# Patient Record
Sex: FEMALE | Race: BLACK OR AFRICAN AMERICAN | Employment: UNEMPLOYED | URBAN - METROPOLITAN AREA
[De-identification: names, ages, dates, MRNs, and addresses within clinical notes are randomized per-mention and may not be internally consistent; named-entity substitution may affect disease eponyms.]

---

## 2017-03-27 ENCOUNTER — OFFICE VISIT (OUTPATIENT)
Dept: SURGERY | Age: 37
End: 2017-03-27

## 2017-03-27 VITALS
HEIGHT: 68 IN | DIASTOLIC BLOOD PRESSURE: 75 MMHG | WEIGHT: 262 LBS | OXYGEN SATURATION: 99 % | HEART RATE: 78 BPM | SYSTOLIC BLOOD PRESSURE: 110 MMHG | RESPIRATION RATE: 14 BRPM | BODY MASS INDEX: 39.71 KG/M2 | TEMPERATURE: 98.4 F

## 2017-03-27 DIAGNOSIS — K80.10 CHRONIC CALCULOUS CHOLECYSTITIS: Primary | ICD-10-CM

## 2017-03-27 PROBLEM — E66.9 OBESITY, CLASS II, BMI 35-39.9: Status: ACTIVE | Noted: 2017-03-27

## 2017-03-27 NOTE — PROGRESS NOTES
Surgery History and Physical    Subjective:      Edu Jacques is a 29 y.o. black female who presents for evaluation of RUQ pain and gallstones. Ms. Nuzhat Hall has a h/o gallstones and has been seen by 2 different surgeons back in 2013. She was scheduled for surgery and cancelled twice because she is anxious. She continues to have postprandial RUQ pain, especially with fried foods. The pain is now radiating around her belly to her back. It is also occurring when she is not eating. She belches which relieves some of her pain. She denies any n/v, fever, diarrhea, or jaundice. She has no h/o PUD, pancreatitis, liver disease, IBD, or IBS. She has not had abdominal surgery previously. Past Medical History:   Diagnosis Date    Abnormal pap 3/2015; 4/2016 2015 LGSIL +HPV; 2013 neg pap +HPV;4/2016 neg pap +HPV    Depression     Eczema     H/O colposcopy with cervical biopsy 3/15 + 10/2013    neg    Morbid obesity ([address]     Multiple sclerosis ([address]     Psychiatric disorder     depression    Seizures ([address]      Past Surgical History:   Procedure Laterality Date    HX BUNIONECTOMY      HX COLPOSCOPY  6/2016    neg    HX GYN      HX LEEP PROCEDURE  6/08    neg    HX ORTHOPAEDIC  2012    bunionectomy, L      Family History   Problem Relation Age of Onset    Diabetes Mother     Hypertension Mother     MS Mother     Cancer Mother      breast    Bipolar Disorder Father     No Known Problems Sister     Breast Cancer Maternal Grandmother      Social History   Substance Use Topics    Smoking status: Never Smoker    Smokeless tobacco: Never Used    Alcohol use Yes      Comment: occasionally      Prior to Admission medications    Medication Sig Start Date End Date Taking? Authorizing Provider   citalopram (CELEXA) 40 mg tablet Take 1.5 Tabs by mouth every evening.  Indications: patient takes a pill and a half 11/28/16  Yes Rajat Sahu MD   levETIRAcetam (KEPPRA) 500 mg tablet Take 1 Tab by mouth two (2) times a day. 8/20/16  Yes Historical Provider   LUXIQ 0.12 % topical foam Apply 1 Applicator to affected area every month. 8/17/16  Yes Historical Provider   dimethyl fumarate 240 mg cpDR Take  by mouth two (2) times a day. Yes Historical Provider   norethindrone-ethinyl estradiol (CYCLAFEM 1/35, 28,) 1-35 mg-mcg tab Take 1 Tab by mouth daily. 4/4/16  Yes Carlita Pennington MD      Allergies   Allergen Reactions    Amoxicillin Hives    Penicillins Unable to Obtain       Review of Systems:  A comprehensive review of systems was negative except for that written in the History of Present Illness. Objective:      Physical Exam:  GENERAL: alert, cooperative, no distress, appears stated age, EYE: negative findings: anicteric sclera, LYMPHATIC: Cervical, supraclavicular nodes normal. , THROAT & NECK: neck supple and symmetrical.  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 Plan:     Ms. Tushar Retana is still anxious, but wants to have her GB removed. I discussed the risks of the procedure including bleeding, infection, wound healing problems, persistent symptoms, reaction to the contrast, prep, or local anesthetic, and injury to the bowel, bile duct, or liver. She understands the risks; any and all questions were answered to her satisfaction. Ms. Tushar Retana will be scheduled for an elective outpatient robotic-assisted laparoscopic cholecystectomy with firefly, possible cholangiogram, possible open under general anesthesia.     Signed By: Catia Martinez MD     March 27, 2017

## 2017-03-27 NOTE — PROGRESS NOTES
1. Have you been to the ER, urgent care clinic since your last visit? Hospitalized since your last visit?no    2. Have you seen or consulted any other health care providers outside of the [address] [place] [address] since your last visit? Include any pap smears or colon screening.  no

## 2017-03-30 ENCOUNTER — TELEPHONE (OUTPATIENT)
Dept: FAMILY MEDICINE CLINIC | Age: 37
End: 2017-03-30

## 2017-03-31 ENCOUNTER — OFFICE VISIT (OUTPATIENT)
Dept: FAMILY MEDICINE CLINIC | Age: 37
End: 2017-03-31

## 2017-03-31 VITALS
RESPIRATION RATE: 20 BRPM | HEIGHT: 68 IN | HEART RATE: 95 BPM | WEIGHT: 262 LBS | TEMPERATURE: 98 F | DIASTOLIC BLOOD PRESSURE: 86 MMHG | BODY MASS INDEX: 39.71 KG/M2 | SYSTOLIC BLOOD PRESSURE: 126 MMHG

## 2017-03-31 DIAGNOSIS — D64.9 ANEMIA, UNSPECIFIED TYPE: Primary | ICD-10-CM

## 2017-03-31 DIAGNOSIS — N92.0 MENORRHAGIA WITH REGULAR CYCLE: ICD-10-CM

## 2017-03-31 RX ORDER — DIMETHYL FUMARATE 240 MG/1
CAPSULE ORAL 2 TIMES DAILY
COMMUNITY
End: 2019-06-21 | Stop reason: SDUPTHER

## 2017-03-31 NOTE — PROGRESS NOTES
HISTORY OF PRESENT ILLNESS  Bri Bennett is a 29 y.o. female. HPI Comments: Alexandra Navarro is 29y.o. year old female who presents today for a follow up on anemia. I received a report from her neurologist and her hemoglobin was low. No family history of colon cancer. Results   The history is provided by the patient (see comments. ). This is a recurrent problem. The current episode started more than 1 week ago. The problem has been gradually worsening. Pertinent negatives include no chest pain, no abdominal pain and no shortness of breath. Associated symptoms comments: She is OCPs for a heavy cycle. Her bleeding has not increased. Evidently, her cycles are every 4 weeks, last 5 days and are not heavy. She uses 2 pads at once on her heaviest days, otherwise she soaks through her clothes. Also, she passes clots. . Exacerbated by: possibly her menstrual cycles. Nothing relieves the symptoms. Treatments tried: OCPs. Improvement on treatment: unknown. Review of Systems   Constitutional: Positive for malaise/fatigue. Respiratory: Negative for shortness of breath. Cardiovascular: Negative for chest pain. Gastrointestinal: Negative for abdominal pain, blood in stool, diarrhea, heartburn, melena, nausea and vomiting. Genitourinary:        No menstrual cramps. Neurological: Negative for dizziness. Endo/Heme/Allergies: Does not bruise/bleed easily. Physical Exam   Constitutional: She is oriented to person, place, and time. She appears well-developed and well-nourished. No distress. Cardiovascular: Normal rate, regular rhythm and normal heart sounds. Exam reveals no gallop and no friction rub. No murmur heard. Pulmonary/Chest: Effort normal and breath sounds normal. No respiratory distress. She has no wheezes. She has no rales. Abdominal: Soft. Normal appearance and bowel sounds are normal. She exhibits no distension. There is no hepatosplenomegaly. There is no tenderness.  There is no rebound and no guarding. Neurological: She is alert and oriented to person, place, and time. Skin: Skin is warm and dry. She is not diaphoretic. Nursing note and vitals reviewed. ASSESSMENT and PLAN    ICD-10-CM ICD-9-CM    1. Anemia, unspecified type D64.9 285.9 ANEMIA PROFILE A      FERRITIN      OCCULT BLOOD, IMMUNOASSAY (FIT)      iron bisgly,ps-FA-B-C#12-succ (IROSPAN 24/6) 65 mg-65 mg -1,000 mcg (24) tab      REFERRAL TO GYNECOLOGY   2. Menorrhagia with regular cycle N92.0 626.2 iron bisgly,ps-FA-B-C#12-succ (IROSPAN 24/6) 65 mg-65 mg -1,000 mcg (24) tab      REFERRAL TO GYNECOLOGY        Anemia, likely due to excessive menstrual bleeding  Labs per orders. Daily iron  Gynecology referral    Follow-up Disposition:  Return if symptoms worsen or fail to improve. Reviewed plan of care. Patient has provided input and agrees with goals.

## 2017-03-31 NOTE — MR AVS SNAPSHOT
Visit Information Date & Time Provider Department Dept. Phone Encounter #  
 3/31/2017 10:30 AM Evert MckayAmanda 34 [id number] Follow-up Instructions Return if symptoms worsen or fail to improve. Your Appointments 4/19/2017  1:15 PM  
POST OP with Luis A Vergara MD  
[id number] [facility]) Appt Note: 4-6-17 JESSICA:Robotic-assisted laparoscopic cholecystectomy with firefly, possible cholangiogram.po  
 [address]  
[phone]  
  
   
 [address] Upcoming Health Maintenance Date Due  
 PAP AKA CERVICAL CYTOLOGY 4/4/2019 DTaP/Tdap/Td series (2 - Td) 9/15/2026 Allergies as of 3/31/2017  Review Complete On: 3/31/2017 By: Evert Mckay MD  
  
 Severity Noted Reaction Type Reactions Amoxicillin  06/01/2013    Hives Penicillins  12/06/2013    Unable to Obtain Current Immunizations  Reviewed on 9/15/2016 No immunizations on file. Not reviewed this visit You Were Diagnosed With   
  
 Codes Comments Anemia, unspecified type    -  Primary ICD-10-CM: D64.9 ICD-9-CM: 285.9 Menorrhagia with regular cycle     ICD-10-CM: N92.0 ICD-9-CM: 626.2 Vitals BP Pulse Temp Resp Height(growth percentile) Weight(growth percentile) 126/86 95 98 °F (36.7 °C) (Oral) 20 5' 8\" (1.727 m) 262 lb (118.8 kg) LMP BMI OB Status Smoking Status 03/21/2017 (Exact Date) 39.84 kg/m2 Having regular periods Never Smoker Vitals History BMI and BSA Data Body Mass Index Body Surface Area  
 39.84 kg/m 2 2.39 m 2 Preferred Pharmacy Pharmacy Name Phone [facility] [address] [place] [address] [address] [facility] [phone] Your Updated Medication List  
  
   
 This list is accurate as of: 3/31/17 11:53 AM.  Always use your most recent med list.  
  
  
  
  
 citalopram 40 mg tablet Commonly known as:  Faizan Loredo Take 1.5 Tabs by mouth every evening. Indications: patient takes a pill and a half  
  
 iron bisgly,ps-FA-B-C#12-succ 65 mg-65 mg -1,000 mcg (24) Tab Commonly known as:  IROSPAN 24/6 Take 1 Tab by mouth daily. levETIRAcetam 500 mg tablet Commonly known as:  KEPPRA Take 1 Tab by mouth two (2) times a day. LUXIQ 0.12 % topical foam  
Generic drug:  betamethasone valerate Apply 1 Applicator to affected area every month. norethindrone-ethinyl estradiol 1-35 mg-mcg Tab Commonly known as:  CYCLAFEM 1/35 (28) Take 1 Tab by mouth daily. TECFIDERA 240 mg Cpdr  
Generic drug:  dimethyl fumarate Take  by mouth. Prescriptions Sent to Pharmacy Refills  
 iron bisgly,ps-FA-B-C#12-succ (IROSPAN 24/6) 65 mg-65 mg -1,000 mcg (24) tab 5 Sig: Take 1 Tab by mouth daily. Class: Normal  
 Pharmacy: [facility] [address] Ph #: [phone] Route: Oral  
  
We Performed the Following ANEMIA PROFILE A [id number] CPT(R)] FERRITIN [24740 CPT(R)] OCCULT BLOOD, IMMUNOASSAY (FIT) L5555796 CPT(R)] REFERRAL TO GYNECOLOGY [REF30 Custom] Comments:  
 Please evaluate patient for anemia, menorrhagia. Follow-up Instructions Return if symptoms worsen or fail to improve. Referral Information Referral ID Referred By Referred To  
  
 [id number] Howard Miles MD   
   [address], [facility] [place] Phone: [phone] Fax: [phone] Visits Status Start Date End Date 1 New Request 3/31/17 3/31/18  If your referral has a status of pending review or denied, additional information will be sent to support the outcome of this decision. Introducing [facility]! Dear Stephanie Diaz: 
Thank you for requesting a [employer] account. Our records indicate that you already have an active Orad account. You can access your account anytime at [email or web address] Did you know that you can access your hospital and ER discharge instructions at any time in Or[employer]? You can also review all of your test results from your hospital stay or ER visit. Additional Information If you have questions, please visit the Frequently Asked Questions section of the Or[employer] website at [email or web address] Remember, Orad is NOT to be used for urgent needs. For medical emergencies, dial 911. Now available from your iPhone and Android! Please provide this summary of care documentation to your next provider. Your primary care clinician is listed as Verna Hou. If you have any questions after today's visit, please call [phone].

## 2017-03-31 NOTE — TELEPHONE ENCOUNTER
Called pt, and left a voice message, asking that he/she call the office back in regard to his/her recent lab/test results.

## 2017-03-31 NOTE — PROGRESS NOTES
Subjective:  Rolando Gomez is 29y.o. year old female who presents today for a follow up on anemia. She was in the ER and her hemoglobin was low.  ***. Patient Active Problem List   Diagnosis Code    Depression, major, single episode, moderate ([address] F32.1    Seizure ([address] R56.9    MS (multiple sclerosis) ([address] G35    History of cervical dysplasia Z87.410    Intrinsic eczema L20.84    Fatigue due to depression F32.9, R53.83    Obesity, Class II, BMI 35-39.9 ([facility]) E66.01    Chronic calculous cholecystitis K80.10     Past Medical History:   Diagnosis Date    Abnormal pap 3/2015; 4/2016 2015 LGSIL +HPV; 2013 neg pap +HPV;4/2016 neg pap +HPV    Depression     Eczema     H/O colposcopy with cervical biopsy 3/15 + 10/2013    neg    Morbid obesity ([address]     Multiple sclerosis ([address]     Psychiatric disorder     depression    Seizures ([address]      Past Surgical History:   Procedure Laterality Date    HX BUNIONECTOMY      HX COLPOSCOPY  6/2016    neg    HX GYN      HX LEEP PROCEDURE  6/08    neg    HX ORTHOPAEDIC Left 2012    Left bunionectomy.  HX ORTHOPAEDIC Right     Right bunionectomy. Family History   Problem Relation Age of Onset    Diabetes Mother     Hypertension Mother    [facility] MS Mother     Cancer Mother      breast    Bipolar Disorder Father     No Known Problems Sister     Breast Cancer Maternal Grandmother      Social History   Substance Use Topics    Smoking status: Never Smoker    Smokeless tobacco: Never Used    Alcohol use Yes      Comment: occasionally     Allergies   Allergen Reactions    Amoxicillin Hives    Penicillins Unable to Obtain     Current Outpatient Prescriptions   Medication Sig Dispense Refill    dimethyl fumarate (TECFIDERA) 240 mg cpDR Take  by mouth.  citalopram (CELEXA) 40 mg tablet Take 1.5 Tabs by mouth every evening.  Indications: patient takes a pill and a half 135 Tab 3    levETIRAcetam (KEPPRA) 500 mg tablet Take 1 Tab by mouth two (2) times a day.  LUXIQ 0.12 % topical foam Apply 1 Applicator to affected area every month.  norethindrone-ethinyl estradiol (CYCLAFEM 1/35, 28,) 1-35 mg-mcg tab Take 1 Tab by mouth daily. 84 Tab 4        {Choose one or more Last Lab values; press DELETE if none desired:[id number]}     Objective:  Visit Vitals    /86    Pulse 95    Temp 98 °F (36.7 °C) (Oral)    Resp 20    Ht 5' 8\" (1.727 m)    Wt 262 lb (118.8 kg)    LMP 03/21/2017 (Exact Date)    BMI 39.84 kg/m2     ***    Assessment/Plan:  {Assessment and Plan:59805}    ***    {Assessment and Plan:25559}      Reviewed plan of care. Patient has provided input and agrees with goals.

## 2017-04-01 LAB
BASOPHILS # BLD AUTO: 0.1 X10E3/UL (ref 0–0.2)
BASOPHILS NFR BLD AUTO: 1 %
EOSINOPHIL # BLD AUTO: 0.1 X10E3/UL (ref 0–0.4)
EOSINOPHIL NFR BLD AUTO: 1 %
ERYTHROCYTE [DISTWIDTH] IN BLOOD BY AUTOMATED COUNT: 15.7 % (ref 12.3–15.4)
FERRITIN SERPL-MCNC: 14 NG/ML (ref 15–150)
HCT VFR BLD AUTO: 37.1 % (ref 34–46.6)
HGB BLD-MCNC: 12 G/DL (ref 11.1–15.9)
IMM GRANULOCYTES # BLD: 0 X10E3/UL (ref 0–0.1)
IMM GRANULOCYTES NFR BLD: 0 %
IRON SATN MFR SERPL: 16 % (ref 15–55)
IRON SERPL-MCNC: 61 UG/DL (ref 27–159)
LYMPHOCYTES # BLD AUTO: 2 X10E3/UL (ref 0.7–3.1)
LYMPHOCYTES NFR BLD AUTO: 26 %
MCH RBC QN AUTO: 26.2 PG (ref 26.6–33)
MCHC RBC AUTO-ENTMCNC: 32.3 G/DL (ref 31.5–35.7)
MCV RBC AUTO: 81 FL (ref 79–97)
MONOCYTES # BLD AUTO: 0.4 X10E3/UL (ref 0.1–0.9)
MONOCYTES NFR BLD AUTO: 6 %
NEUTROPHILS # BLD AUTO: 5.1 X10E3/UL (ref 1.4–7)
NEUTROPHILS NFR BLD AUTO: 66 %
PLATELET # BLD AUTO: 410 X10E3/UL (ref 150–379)
RBC # BLD AUTO: 4.58 X10E6/UL (ref 3.77–5.28)
RETICS/RBC NFR AUTO: 1.6 % (ref 0.6–2.6)
TIBC SERPL-MCNC: 386 UG/DL (ref 250–450)
UIBC SERPL-MCNC: 325 UG/DL (ref 131–425)
WBC # BLD AUTO: 7.6 X10E3/UL (ref 3.4–10.8)

## 2017-04-05 ENCOUNTER — ANESTHESIA EVENT (OUTPATIENT)
Dept: SURGERY | Age: 37
End: 2017-04-05
Payer: COMMERCIAL

## 2017-04-06 ENCOUNTER — ANESTHESIA (OUTPATIENT)
Dept: SURGERY | Age: 37
End: 2017-04-06
Payer: COMMERCIAL

## 2017-04-06 ENCOUNTER — SURGERY (OUTPATIENT)
Age: 37
End: 2017-04-06

## 2017-04-06 ENCOUNTER — HOSPITAL ENCOUNTER (OUTPATIENT)
Age: 37
Setting detail: OUTPATIENT SURGERY
Discharge: HOME | End: 2017-04-06
Attending: SURGERY | Admitting: SURGERY
Payer: COMMERCIAL

## 2017-04-06 VITALS
DIASTOLIC BLOOD PRESSURE: 81 MMHG | TEMPERATURE: 97.8 F | OXYGEN SATURATION: 98 % | BODY MASS INDEX: 41.31 KG/M2 | RESPIRATION RATE: 18 BRPM | HEART RATE: 97 BPM | HEIGHT: 67 IN | WEIGHT: 263.23 LBS | SYSTOLIC BLOOD PRESSURE: 149 MMHG

## 2017-04-06 DIAGNOSIS — K80.10 CHRONIC CALCULOUS CHOLECYSTITIS: ICD-10-CM

## 2017-04-06 DIAGNOSIS — N92.0 MENORRHAGIA WITH REGULAR CYCLE: ICD-10-CM

## 2017-04-06 DIAGNOSIS — D64.9 ANEMIA, UNSPECIFIED TYPE: ICD-10-CM

## 2017-04-06 LAB
ALBUMIN SERPL BCP-MCNC: 3.7 G/DL (ref 3.5–5)
ALBUMIN/GLOB SERPL: 0.9 {RATIO} (ref 1.1–2.2)
ALP SERPL-CCNC: 69 U/L (ref 45–117)
ALT SERPL-CCNC: 27 U/L (ref 12–78)
ANION GAP BLD CALC-SCNC: 13 MMOL/L (ref 5–15)
AST SERPL W P-5'-P-CCNC: 20 U/L (ref 15–37)
BILIRUB SERPL-MCNC: 0.2 MG/DL (ref 0.2–1)
BUN SERPL-MCNC: 9 MG/DL (ref 6–20)
BUN/CREAT SERPL: 17 (ref 12–20)
CALCIUM SERPL-MCNC: 8.5 MG/DL (ref 8.5–10.1)
CHLORIDE SERPL-SCNC: 104 MMOL/L (ref 97–108)
CO2 SERPL-SCNC: 22 MMOL/L (ref 21–32)
CREAT SERPL-MCNC: 0.53 MG/DL (ref 0.55–1.02)
GLOBULIN SER CALC-MCNC: 4.1 G/DL (ref 2–4)
GLUCOSE SERPL-MCNC: 88 MG/DL (ref 65–100)
HCG SERPL QL: NEGATIVE
LIPASE SERPL-CCNC: 77 U/L (ref 73–393)
POTASSIUM SERPL-SCNC: 3.8 MMOL/L (ref 3.5–5.1)
PROT SERPL-MCNC: 7.8 G/DL (ref 6.4–8.2)
SODIUM SERPL-SCNC: 139 MMOL/L (ref 136–145)

## 2017-04-06 PROCEDURE — 76010000934 HC OR TIME 1 TO 1.5HR INTENSV - TIER 2: Performed by: SURGERY

## 2017-04-06 PROCEDURE — 77030035048 HC TRCR ENDOSC OPTCL COVD -B: Performed by: SURGERY

## 2017-04-06 PROCEDURE — 76210000016 HC OR PH I REC 1 TO 1.5 HR: Performed by: SURGERY

## 2017-04-06 PROCEDURE — 76210000020 HC REC RM PH II FIRST 0.5 HR: Performed by: SURGERY

## 2017-04-06 PROCEDURE — 77030002933 HC SUT MCRYL J&J -A: Performed by: SURGERY

## 2017-04-06 PROCEDURE — 74011000250 HC RX REV CODE- 250: Performed by: SURGERY

## 2017-04-06 PROCEDURE — 76060000033 HC ANESTHESIA 1 TO 1.5 HR: Performed by: SURGERY

## 2017-04-06 PROCEDURE — 83690 ASSAY OF LIPASE: CPT | Performed by: SURGERY

## 2017-04-06 PROCEDURE — 88304 TISSUE EXAM BY PATHOLOGIST: CPT | Performed by: SURGERY

## 2017-04-06 PROCEDURE — 74011250636 HC RX REV CODE- 250/636

## 2017-04-06 PROCEDURE — 74011000250 HC RX REV CODE- 250

## 2017-04-06 PROCEDURE — 74011250636 HC RX REV CODE- 250/636: Performed by: ANESTHESIOLOGY

## 2017-04-06 PROCEDURE — 77030026438 HC STYL ET INTUB CARD -A: Performed by: ANESTHESIOLOGY

## 2017-04-06 PROCEDURE — 77030031139 HC SUT VCRL2 J&J -A: Performed by: SURGERY

## 2017-04-06 PROCEDURE — 77030032490 HC SLV COMPR SCD KNE COVD -B: Performed by: SURGERY

## 2017-04-06 PROCEDURE — 77030013474 HC CRD BPLR DISP ADLR -A: Performed by: SURGERY

## 2017-04-06 PROCEDURE — 77030010939 HC CLP LIG TELE -B: Performed by: SURGERY

## 2017-04-06 PROCEDURE — 77030035277 HC OBTRTR BLDELSS DISP INTU -B: Performed by: SURGERY

## 2017-04-06 PROCEDURE — 84703 CHORIONIC GONADOTROPIN ASSAY: CPT | Performed by: SURGERY

## 2017-04-06 PROCEDURE — 74011000254 HC RX REV CODE- 254

## 2017-04-06 PROCEDURE — 77030008771 HC TU NG SALEM SUMP -A: Performed by: ANESTHESIOLOGY

## 2017-04-06 PROCEDURE — 36415 COLL VENOUS BLD VENIPUNCTURE: CPT | Performed by: SURGERY

## 2017-04-06 PROCEDURE — 74011250636 HC RX REV CODE- 250/636: Performed by: SURGERY

## 2017-04-06 PROCEDURE — 77030019908 HC STETH ESOPH SIMS -A: Performed by: ANESTHESIOLOGY

## 2017-04-06 PROCEDURE — 77030018673: Performed by: SURGERY

## 2017-04-06 PROCEDURE — 77030009848 HC PASSR SUT SET COOP -C: Performed by: SURGERY

## 2017-04-06 PROCEDURE — 77030018836 HC SOL IRR NACL ICUM -A: Performed by: SURGERY

## 2017-04-06 PROCEDURE — 77030008557 HC TBNG SMK EVAC STOR -B: Performed by: SURGERY

## 2017-04-06 PROCEDURE — 77030008684 HC TU ET CUF COVD -B: Performed by: ANESTHESIOLOGY

## 2017-04-06 PROCEDURE — 77030034165 HC CATH URETH FOL W/MTR BARD -A: Performed by: SURGERY

## 2017-04-06 PROCEDURE — 77030016151 HC PROTCTR LNS DFOG COVD -B: Performed by: SURGERY

## 2017-04-06 PROCEDURE — 80053 COMPREHEN METABOLIC PANEL: CPT | Performed by: SURGERY

## 2017-04-06 PROCEDURE — 77030020782 HC GWN BAIR PAWS FLX 3M -B

## 2017-04-06 PROCEDURE — 77030011640 HC PAD GRND REM COVD -A: Performed by: SURGERY

## 2017-04-06 RX ORDER — MIDAZOLAM HYDROCHLORIDE 1 MG/ML
1 INJECTION, SOLUTION INTRAMUSCULAR; INTRAVENOUS AS NEEDED
Status: DISCONTINUED | OUTPATIENT
Start: 2017-04-06 | End: 2017-04-06 | Stop reason: HOSPADM

## 2017-04-06 RX ORDER — ONDANSETRON 2 MG/ML
INJECTION INTRAMUSCULAR; INTRAVENOUS AS NEEDED
Status: DISCONTINUED | OUTPATIENT
Start: 2017-04-06 | End: 2017-04-06 | Stop reason: HOSPADM

## 2017-04-06 RX ORDER — SODIUM CHLORIDE, SODIUM LACTATE, POTASSIUM CHLORIDE, CALCIUM CHLORIDE 600; 310; 30; 20 MG/100ML; MG/100ML; MG/100ML; MG/100ML
100 INJECTION, SOLUTION INTRAVENOUS CONTINUOUS
Status: DISCONTINUED | OUTPATIENT
Start: 2017-04-06 | End: 2017-04-07 | Stop reason: HOSPADM

## 2017-04-06 RX ORDER — HYDROMORPHONE HYDROCHLORIDE 2 MG/ML
INJECTION, SOLUTION INTRAMUSCULAR; INTRAVENOUS; SUBCUTANEOUS AS NEEDED
Status: DISCONTINUED | OUTPATIENT
Start: 2017-04-06 | End: 2017-04-06 | Stop reason: HOSPADM

## 2017-04-06 RX ORDER — ROCURONIUM BROMIDE 10 MG/ML
INJECTION, SOLUTION INTRAVENOUS AS NEEDED
Status: DISCONTINUED | OUTPATIENT
Start: 2017-04-06 | End: 2017-04-06 | Stop reason: HOSPADM

## 2017-04-06 RX ORDER — DIPHENHYDRAMINE HYDROCHLORIDE 50 MG/ML
12.5 INJECTION, SOLUTION INTRAMUSCULAR; INTRAVENOUS AS NEEDED
Status: ACTIVE | OUTPATIENT
Start: 2017-04-06 | End: 2017-04-06

## 2017-04-06 RX ORDER — ALBUTEROL SULFATE 0.83 MG/ML
2.5 SOLUTION RESPIRATORY (INHALATION) AS NEEDED
Status: DISCONTINUED | OUTPATIENT
Start: 2017-04-06 | End: 2017-04-07 | Stop reason: HOSPADM

## 2017-04-06 RX ORDER — MIDAZOLAM HYDROCHLORIDE 1 MG/ML
INJECTION, SOLUTION INTRAMUSCULAR; INTRAVENOUS AS NEEDED
Status: DISCONTINUED | OUTPATIENT
Start: 2017-04-06 | End: 2017-04-06 | Stop reason: HOSPADM

## 2017-04-06 RX ORDER — FENTANYL CITRATE 50 UG/ML
INJECTION, SOLUTION INTRAMUSCULAR; INTRAVENOUS AS NEEDED
Status: DISCONTINUED | OUTPATIENT
Start: 2017-04-06 | End: 2017-04-06 | Stop reason: HOSPADM

## 2017-04-06 RX ORDER — NEOSTIGMINE METHYLSULFATE 1 MG/ML
INJECTION INTRAVENOUS AS NEEDED
Status: DISCONTINUED | OUTPATIENT
Start: 2017-04-06 | End: 2017-04-06 | Stop reason: HOSPADM

## 2017-04-06 RX ORDER — DEXAMETHASONE SODIUM PHOSPHATE 4 MG/ML
INJECTION, SOLUTION INTRA-ARTICULAR; INTRALESIONAL; INTRAMUSCULAR; INTRAVENOUS; SOFT TISSUE AS NEEDED
Status: DISCONTINUED | OUTPATIENT
Start: 2017-04-06 | End: 2017-04-06 | Stop reason: HOSPADM

## 2017-04-06 RX ORDER — INDOCYANINE GREEN AND WATER 25 MG
KIT INJECTION AS NEEDED
Status: DISCONTINUED | OUTPATIENT
Start: 2017-04-06 | End: 2017-04-06 | Stop reason: HOSPADM

## 2017-04-06 RX ORDER — HYDROMORPHONE HYDROCHLORIDE 1 MG/ML
1 INJECTION, SOLUTION INTRAMUSCULAR; INTRAVENOUS; SUBCUTANEOUS
Status: DISCONTINUED | OUTPATIENT
Start: 2017-04-06 | End: 2017-04-07 | Stop reason: HOSPADM

## 2017-04-06 RX ORDER — GLYCOPYRROLATE 0.2 MG/ML
INJECTION INTRAMUSCULAR; INTRAVENOUS AS NEEDED
Status: DISCONTINUED | OUTPATIENT
Start: 2017-04-06 | End: 2017-04-06 | Stop reason: HOSPADM

## 2017-04-06 RX ORDER — LIDOCAINE HYDROCHLORIDE 10 MG/ML
0.1 INJECTION, SOLUTION EPIDURAL; INFILTRATION; INTRACAUDAL; PERINEURAL AS NEEDED
Status: DISCONTINUED | OUTPATIENT
Start: 2017-04-06 | End: 2017-04-06 | Stop reason: HOSPADM

## 2017-04-06 RX ORDER — BUPIVACAINE HYDROCHLORIDE AND EPINEPHRINE 5; 5 MG/ML; UG/ML
INJECTION, SOLUTION EPIDURAL; INTRACAUDAL; PERINEURAL AS NEEDED
Status: DISCONTINUED | OUTPATIENT
Start: 2017-04-06 | End: 2017-04-06 | Stop reason: HOSPADM

## 2017-04-06 RX ORDER — PROPOFOL 10 MG/ML
INJECTION, EMULSION INTRAVENOUS AS NEEDED
Status: DISCONTINUED | OUTPATIENT
Start: 2017-04-06 | End: 2017-04-06 | Stop reason: HOSPADM

## 2017-04-06 RX ORDER — KETOROLAC TROMETHAMINE 30 MG/ML
INJECTION, SOLUTION INTRAMUSCULAR; INTRAVENOUS AS NEEDED
Status: DISCONTINUED | OUTPATIENT
Start: 2017-04-06 | End: 2017-04-06 | Stop reason: HOSPADM

## 2017-04-06 RX ORDER — SUCCINYLCHOLINE CHLORIDE 20 MG/ML
INJECTION INTRAMUSCULAR; INTRAVENOUS AS NEEDED
Status: DISCONTINUED | OUTPATIENT
Start: 2017-04-06 | End: 2017-04-06 | Stop reason: HOSPADM

## 2017-04-06 RX ORDER — OXYCODONE AND ACETAMINOPHEN 5; 325 MG/1; MG/1
1 TABLET ORAL
Qty: 40 TAB | Refills: 0 | Status: SHIPPED | OUTPATIENT
Start: 2017-04-06 | End: 2017-04-25

## 2017-04-06 RX ORDER — ONDANSETRON 2 MG/ML
4 INJECTION INTRAMUSCULAR; INTRAVENOUS AS NEEDED
Status: DISCONTINUED | OUTPATIENT
Start: 2017-04-06 | End: 2017-04-07 | Stop reason: HOSPADM

## 2017-04-06 RX ORDER — LIDOCAINE HYDROCHLORIDE 20 MG/ML
INJECTION, SOLUTION EPIDURAL; INFILTRATION; INTRACAUDAL; PERINEURAL AS NEEDED
Status: DISCONTINUED | OUTPATIENT
Start: 2017-04-06 | End: 2017-04-06 | Stop reason: HOSPADM

## 2017-04-06 RX ORDER — CEFAZOLIN SODIUM IN 0.9 % NACL 2 G/50 ML
2 INTRAVENOUS SOLUTION, PIGGYBACK (ML) INTRAVENOUS ONCE
Status: DISCONTINUED | OUTPATIENT
Start: 2017-04-06 | End: 2017-04-06 | Stop reason: SDUPTHER

## 2017-04-06 RX ORDER — FENTANYL CITRATE 50 UG/ML
50 INJECTION, SOLUTION INTRAMUSCULAR; INTRAVENOUS AS NEEDED
Status: DISCONTINUED | OUTPATIENT
Start: 2017-04-06 | End: 2017-04-06 | Stop reason: HOSPADM

## 2017-04-06 RX ORDER — SODIUM CHLORIDE, SODIUM LACTATE, POTASSIUM CHLORIDE, CALCIUM CHLORIDE 600; 310; 30; 20 MG/100ML; MG/100ML; MG/100ML; MG/100ML
150 INJECTION, SOLUTION INTRAVENOUS CONTINUOUS
Status: DISCONTINUED | OUTPATIENT
Start: 2017-04-06 | End: 2017-04-06 | Stop reason: HOSPADM

## 2017-04-06 RX ADMIN — MIDAZOLAM HYDROCHLORIDE 3 MG: 1 INJECTION, SOLUTION INTRAMUSCULAR; INTRAVENOUS at 14:51

## 2017-04-06 RX ADMIN — FENTANYL CITRATE 100 MCG: 50 INJECTION, SOLUTION INTRAMUSCULAR; INTRAVENOUS at 15:26

## 2017-04-06 RX ADMIN — ROCURONIUM BROMIDE 10 MG: 10 INJECTION, SOLUTION INTRAVENOUS at 15:30

## 2017-04-06 RX ADMIN — SODIUM CHLORIDE, SODIUM LACTATE, POTASSIUM CHLORIDE, AND CALCIUM CHLORIDE: 600; 310; 30; 20 INJECTION, SOLUTION INTRAVENOUS at 15:46

## 2017-04-06 RX ADMIN — LIDOCAINE HYDROCHLORIDE 40 MG: 20 INJECTION, SOLUTION EPIDURAL; INFILTRATION; INTRACAUDAL; PERINEURAL at 15:00

## 2017-04-06 RX ADMIN — GLYCOPYRROLATE 0.2 MG: 0.2 INJECTION INTRAMUSCULAR; INTRAVENOUS at 15:58

## 2017-04-06 RX ADMIN — DEXAMETHASONE SODIUM PHOSPHATE 4 MG: 4 INJECTION, SOLUTION INTRA-ARTICULAR; INTRALESIONAL; INTRAMUSCULAR; INTRAVENOUS; SOFT TISSUE at 15:05

## 2017-04-06 RX ADMIN — MIDAZOLAM HYDROCHLORIDE 1 MG: 1 INJECTION, SOLUTION INTRAMUSCULAR; INTRAVENOUS at 14:56

## 2017-04-06 RX ADMIN — FENTANYL CITRATE 100 MCG: 50 INJECTION, SOLUTION INTRAMUSCULAR; INTRAVENOUS at 15:00

## 2017-04-06 RX ADMIN — NEOSTIGMINE METHYLSULFATE 2 MG: 1 INJECTION INTRAVENOUS at 15:58

## 2017-04-06 RX ADMIN — PROPOFOL 160 MG: 10 INJECTION, EMULSION INTRAVENOUS at 15:00

## 2017-04-06 RX ADMIN — MIDAZOLAM HYDROCHLORIDE 1 MG: 1 INJECTION, SOLUTION INTRAMUSCULAR; INTRAVENOUS at 14:58

## 2017-04-06 RX ADMIN — ONDANSETRON 4 MG: 2 INJECTION INTRAMUSCULAR; INTRAVENOUS at 15:51

## 2017-04-06 RX ADMIN — INDOCYANINE GREEN AND WATER 5 MG: KIT at 15:06

## 2017-04-06 RX ADMIN — HYDROMORPHONE HYDROCHLORIDE 1 MG: 1 INJECTION, SOLUTION INTRAMUSCULAR; INTRAVENOUS; SUBCUTANEOUS at 16:36

## 2017-04-06 RX ADMIN — ROCURONIUM BROMIDE 30 MG: 10 INJECTION, SOLUTION INTRAVENOUS at 15:05

## 2017-04-06 RX ADMIN — HYDROMORPHONE HYDROCHLORIDE 0.5 MG: 2 INJECTION, SOLUTION INTRAMUSCULAR; INTRAVENOUS; SUBCUTANEOUS at 16:01

## 2017-04-06 RX ADMIN — SODIUM CHLORIDE, SODIUM LACTATE, POTASSIUM CHLORIDE, AND CALCIUM CHLORIDE 150 ML/HR: 600; 310; 30; 20 INJECTION, SOLUTION INTRAVENOUS at 14:33

## 2017-04-06 RX ADMIN — FENTANYL CITRATE 50 MCG: 50 INJECTION, SOLUTION INTRAMUSCULAR; INTRAVENOUS at 15:05

## 2017-04-06 RX ADMIN — BUPIVACAINE HYDROCHLORIDE AND EPINEPHRINE 30 ML: 5; 5 INJECTION, SOLUTION EPIDURAL; INTRACAUDAL; PERINEURAL at 16:01

## 2017-04-06 RX ADMIN — CEFAZOLIN 0.2 G: 1 INJECTION, POWDER, FOR SOLUTION INTRAMUSCULAR; INTRAVENOUS; PARENTERAL at 14:33

## 2017-04-06 RX ADMIN — KETOROLAC TROMETHAMINE 30 MG: 30 INJECTION, SOLUTION INTRAMUSCULAR; INTRAVENOUS at 15:51

## 2017-04-06 RX ADMIN — ROCURONIUM BROMIDE 5 MG: 10 INJECTION, SOLUTION INTRAVENOUS at 15:00

## 2017-04-06 RX ADMIN — SUCCINYLCHOLINE CHLORIDE 100 MG: 20 INJECTION INTRAMUSCULAR; INTRAVENOUS at 15:01

## 2017-04-06 NOTE — INTERVAL H&P NOTE
H&P Update:  Alek Ritter was seen and examined. History and physical has been reviewed. The patient has been examined.  There have been no significant clinical changes since the completion of the originally dated History and Physical.    Signed By: Maria Eugenia Orta MD     April 6, 2017 6:15 AM

## 2017-04-06 NOTE — IP AVS SNAPSHOT
Current Discharge Medication List  
  
START taking these medications Dose & Instructions Dispensing Information Comments Morning Noon Evening Bedtime  
 oxyCODONE-acetaminophen 5-325 mg per tablet Commonly known as:  PERCOCET Your last dose was: Your next dose is:    
   
   
 Dose:  1 Tab Take 1 Tab by mouth every four (4) hours as needed for Pain. Max Daily Amount: 6 Tabs. Quantity:  40 Tab Refills:  0 CONTINUE these medications which have NOT CHANGED Dose & Instructions Dispensing Information Comments Morning Noon Evening Bedtime  
 citalopram 40 mg tablet Commonly known as:  Rusty Donate Your last dose was: Your next dose is:    
   
   
 Dose:  60 mg Take 1.5 Tabs by mouth every evening. Indications: patient takes a pill and a half Quantity:  135 Tab Refills:  3  
     
   
   
   
  
 iron bisgly,ps-FA-B-C#12-succ 65 mg-65 mg -1,000 mcg (24) Tab Commonly known as:  IROSPAN 24/6 Your last dose was: Your next dose is:    
   
   
 Dose:  1 Tab Take 1 Tab by mouth daily. Quantity:  30 Tab Refills:  5  
     
   
   
   
  
 levETIRAcetam 500 mg tablet Commonly known as:  KEPPRA Your last dose was: Your next dose is:    
   
   
 Dose:  1 Tab Take 1 Tab by mouth two (2) times a day. Refills:  0 LUXIQ 0.12 % topical foam  
Generic drug:  betamethasone valerate Your last dose was: Your next dose is:    
   
   
 Dose:  1 Applicator Apply 1 Applicator to affected area every month. Refills:  0  
     
   
   
   
  
 norethindrone-ethinyl estradiol 1-35 mg-mcg Tab Commonly known as:  CYCLAFEM 1/35 (28) Your last dose was: Your next dose is:    
   
   
 Dose:  1 Tab Take 1 Tab by mouth daily. Quantity:  84 Tab Refills:  4 TECFIDERA 240 mg Cpdr  
Generic drug:  dimethyl fumarate Your last dose was: Your next dose is: Take  by mouth. Refills:  0 Where to Get Your Medications Information on where to get these meds will be given to you by the nurse or doctor. ! Ask your nurse or doctor about these medications  
  oxyCODONE-acetaminophen 5-325 mg per tablet

## 2017-04-06 NOTE — ANESTHESIA POSTPROCEDURE EVALUATION
Post-Anesthesia Evaluation and Assessment    Patient: Laurence Agee MRN: [id number]  SSN: [id number]    YOB: 1980  Age: 29 y.o. Sex: female       Cardiovascular Function/Vital Signs  Visit Vitals    /81    Pulse 97    Temp 37.1 °C (98.8 °F)    Resp 18    Ht 5' 7\" (1.702 m)    Wt 119.4 kg (263 lb 3.7 oz)    SpO2 98%    BMI 41.23 kg/m2       Patient is status post general anesthesia for Procedure(s):  ROBOTIC ASSISTED LAPAROSCOPIC CHOLECYSTECTOMY WITH FIREFLY, POSSIBLE GRAMS, POSSIBLE OPEN. Nausea/Vomiting: None    Postoperative hydration reviewed and adequate. Pain:  Pain Scale 1: Numeric (0 - 10) (04/06/17 1647)  Pain Intensity 1: 5 (04/06/17 1647)   Managed    Neurological Status:   Neuro (WDL): Exceptions to WDL (04/06/17 1615)  Neuro  Neurologic State: Drowsy (04/06/17 1615)  Orientation Level: Oriented X4 (04/06/17 1615)  Cognition: Follows commands (04/06/17 1615)   At baseline    Mental Status and Level of Consciousness: Arousable    Pulmonary Status:   O2 Device: Nasal cannula (04/06/17 1626)   Adequate oxygenation and airway patent    Complications related to anesthesia: None    Post-anesthesia assessment completed.  No concerns    Signed By: Mukul Land MD     April 6, 2017

## 2017-04-06 NOTE — ANESTHESIA PREPROCEDURE EVALUATION
Anesthetic History   No history of anesthetic complications            Review of Systems / Medical History  Patient summary reviewed, nursing notes reviewed and pertinent labs reviewed    Pulmonary  Within defined limits                 Neuro/Psych   Within defined limits           Cardiovascular  Within defined limits                     GI/Hepatic/Renal  Within defined limits              Endo/Other  Within defined limits           Other Findings              Physical Exam    Airway  Mallampati: II  TM Distance: 4 - 6 cm  Neck ROM: normal range of motion   Mouth opening: Normal     Cardiovascular    Rhythm: regular  Rate: normal         Dental  No notable dental hx       Pulmonary  Breath sounds clear to auscultation               Abdominal         Other Findings            Anesthetic Plan    ASA: 2  Anesthesia type: general            Anesthetic plan and risks discussed with: Patient

## 2017-04-06 NOTE — TELEPHONE ENCOUNTER
[facility] faxed over a medication refill but would like to see if there is another medication as they do not carry this in their warehouse.

## 2017-04-06 NOTE — IP AVS SNAPSHOT
Radha Solano 
 
 
 [address] [facility] 
[phone] Patient: Kathy Lange MRN: [id number] :1980 You are allergic to the following Allergen Reactions Amoxicillin Hives Penicillins Hives Recent Documentation Height Weight BMI OB Status Smoking Status 1.702 m 119.4 kg 41.23 kg/m2 Having regular periods Never Smoker Emergency Contacts Name Discharge Info Relation Home Work Mobile Anahy Cochran DISCHARGE CAREGIVER [3] Mother [14] [phone] About your hospitalization You were admitted on:  2017 You last received care in the:  [facility] PACU You were discharged on:  2017 Unit phone number:  [phone] Why you were hospitalized Your primary diagnosis was:  Not on File Providers Seen During Your Hospitalizations Provider Role Specialty Primary office phone Gaudencio Wilcox MD Attending Provider Surgery [phone] Your Primary Care Physician (PCP) Primary Care Physician Office Phone Office Fax Jeanette Bright [phone] Follow-up Information Follow up With Details Comments Contact Info Selina Lux MD   [address] [place] [address] [address] [facility] 
[phone] Your Appointments 2017  1:15 PM EDT  
POST OP with Gaudencio Wilcox MD  
[id number] [facility] [address] [place] [address]) [address] [place], [place] [address] Road [address] [place] [address] [address] [facility]  
[phone] 2017  8:30 AM EDT FOLLOW UP 10 with Dipak Dennison MD  
[facility] ([address]) [address] [facility]  
[phone] Current Discharge Medication List  
  
START taking these medications Dose & Instructions Dispensing Information Comments Morning Noon Evening Bedtime oxyCODONE-acetaminophen 5-325 mg per tablet Commonly known as:  PERCOCET Your last dose was: Your next dose is:    
   
   
 Dose:  1 Tab Take 1 Tab by mouth every four (4) hours as needed for Pain. Max Daily Amount: 6 Tabs. Quantity:  40 Tab Refills:  0 CONTINUE these medications which have NOT CHANGED Dose & Instructions Dispensing Information Comments Morning Noon Evening Bedtime  
 citalopram 40 mg tablet Commonly known as:  Edgar Peasant Your last dose was: Your next dose is:    
   
   
 Dose:  60 mg Take 1.5 Tabs by mouth every evening. Indications: patient takes a pill and a half Quantity:  135 Tab Refills:  3  
     
   
   
   
  
 iron bisgly,ps-FA-B-C#12-succ 65 mg-65 mg -1,000 mcg (24) Tab Commonly known as:  IROSPAN 24/6 Your last dose was: Your next dose is:    
   
   
 Dose:  1 Tab Take 1 Tab by mouth daily. Quantity:  30 Tab Refills:  5  
     
   
   
   
  
 levETIRAcetam 500 mg tablet Commonly known as:  KEPPRA Your last dose was: Your next dose is:    
   
   
 Dose:  1 Tab Take 1 Tab by mouth two (2) times a day. Refills:  0 LUXIQ 0.12 % topical foam  
Generic drug:  betamethasone valerate Your last dose was: Your next dose is:    
   
   
 Dose:  1 Applicator Apply 1 Applicator to affected area every month. Refills:  0  
     
   
   
   
  
 norethindrone-ethinyl estradiol 1-35 mg-mcg Tab Commonly known as:  CYCLAFEM 1/35 (28) Your last dose was: Your next dose is:    
   
   
 Dose:  1 Tab Take 1 Tab by mouth daily. Quantity:  84 Tab Refills:  4 TECFIDERA 240 mg Cpdr  
Generic drug:  dimethyl fumarate Your last dose was: Your next dose is: Take  by mouth. Refills:  0 Where to Get Your Medications Information on where to get these meds will be given to you by the nurse or doctor. ! Ask your nurse or doctor about these medications  
  oxyCODONE-acetaminophen 5-325 mg per tablet Discharge Instructions Cholecystectomy: What to Expect at [facility] Your Recovery After your surgery, it is normal to feel weak and tired for several days after you return home. Your belly may be swollen. If you had laparoscopic surgery, you may also have pain in your shoulder for about 24 hours. You may have gas or need to burp a lot at first, and a few people get diarrhea. The diarrhea usually goes away in 2 to 4 weeks, but it may last longer. How quickly you recover depends on whether you had a laparoscopic or open surgery. · For a laparoscopic surgery, most people can go back to work or their normal routine in 1 to 2 weeks, but it may take longer, depending on the type of work you do. · For an open surgery, it will probably take 4 to 6 weeks before you get back to your normal routine. This care sheet gives you a general idea about how long it will take for you to recover; however, each person recovers at a different pace. Follow the steps below to get better as quickly as possible. How can you care for yourself at home? Activity · Rest when you feel tired. Getting enough sleep will help you recover. · Try to walk each day. Start out by walking a little more than you did the day before. Gradually increase the amount you walk. Walking boosts blood flow and helps prevent pneumonia and constipation. · For about 2 weeks, avoid lifting anything that would make you strain. This may include a child, heavy grocery bags and milk containers, a heavy briefcase or backpack, cat litter or dog food bags, or a vacuum . · Avoid strenuous activities, such as biking, jogging, weightlifting, and aerobic exercise, until your doctor says it is okay. · You may shower 24 hours after surgery, if your doctor okays it. Pat the cuts (incisions) dry. Do not take a bath for the first 2 weeks and until seen by your doctor. · You may drive when you are no longer taking pain medicine and can quickly move your foot from the gas pedal to the brake. You must also be able to sit comfortably for a long period of time, even if you do not plan to go far because you might get caught in traffic. For a laparoscopic surgery, most people can go back to work or their normal routine in 1 to 2 weeks, but it may take longer. For an open surgery, it will probably take 4 to 6 weeks before you get back to your normal routine. Diet · Eat smaller meals more often instead of fewer larger meals. You can eat a normal diet. If your stomach is upset, try bland, low-fat foods like plain rice, broiled chicken, toast, and yogurt, and avoid eating fatty foods for about 1 month. Fatty foods include hamburger, whole milk, cheese, and many snack foods. · Drink plenty of fluids (unless your doctor tells you not to). · If you have diarrhea, try avoiding spicy foods, dairy products, fatty foods, and alcohol. You can also watch to see if specific foods cause it, and stop eating them. If the diarrhea continues for more than 2 weeks, talk to your doctor. · You may notice that your bowel movements are not regular right after your surgery. This is common. Try to avoid constipation and straining with bowel movements. You may want to take a fiber supplement every day. If you have not had a bowel movement after a couple of days, ask your doctor about taking a mild laxative. Medicines · You can restart your medicines. You will also give you instructions about taking any new medicines. · If you take blood thinners, such as warfarin (Coumadin), be sure to talk to your doctor.   Your doctor will tell you if and when to start taking those medicines again. Make sure that you understand exactly what your doctor wants you to do. · Take pain medicines exactly as directed. ¨ If the doctor gave you a prescription medicine for pain, take it as prescribed. ¨ If you are not taking a prescription pain medicine, take an over-the-counter medicine such as acetaminophen (Tylenol), ibuprofen (Advil, Motrin), or naproxen (Aleve). Read and follow all instructions on the label. ¨ Do not take two or more pain medicines at the same time unless the doctor told you to. Many pain medicines contain acetaminophen, which is Tylenol. Too much Tylenol can be harmful. · If you think your pain medicine is making you sick to your stomach: 
¨ Take your medicine after meals (unless your doctor tells you not to). ¨ Ask your doctor for a different pain medicine. Incision care · Remove your bandages on Saturday, 4/8. You may leave your incisions uncovered. · If you have strips of tape on the incision, or cut, leave the tape on for a week or until it falls off. · After 24 to 48 hours, wash the area daily with warm, soapy water, and pat it dry. · Keep the area clean and dry. You may cover it with a gauze bandage if it weeps or rubs against clothing. Change the bandage every day. Ice · To reduce swelling and pain, put ice or a cold pack on your belly for 10 to 20 minutes at a time. Do this every 1 to 2 hours. Put a thin cloth between the ice and your skin. Follow-up care is a key part of your treatment and safety. Be sure to make and go to all appointments, and call your doctor if you are having problems. It's also a good idea to know your test results and keep a list of the medicines you take. When should you call for help? Call 911 anytime you think you may need emergency care. For example, call if: 
· You passed out (lost consciousness). · You have severe trouble breathing. · You have sudden chest pain and shortness of breath, or you cough up blood. Call your doctor now or seek immediate medical care if: 
· You are sick to your stomach and cannot drink fluids. · You have abdominal pain that does not get better when you take your pain medicine. · You have signs of infection, such as: 
¨ Increased pain, swelling, warmth, or redness. ¨ Red streaks leading from the incision. ¨ Pus draining from the incision. ¨ Swollen lymph nodes in your neck, armpits, or groin. ¨ A fever >101. · Your urine turns dark brown or your stool is light-colored or romeo-colored. · Your skin or the whites of your eyes turn yellow. · Bright red blood has soaked through a large bandage over your incision. · You have signs of a blood clot, such as: 
¨ Pain in your calf, back of knee, thigh, or groin. ¨ Redness and swelling in your leg or groin. · You have trouble passing urine or stool, especially if you have mild pain or swelling in your lower belly. Watch closely for any changes in your health, and be sure to contact your doctor if: 
· You had a laparoscopic surgery and your shoulder pain lasts more than 24 hours. · You do not have a bowel movement after taking a laxative. Where can you learn more? Go to http://luis daniel-rashawn.info/. Enter 484 91 [phone] in the search box to learn more about \"Cholecystectomy: What to Expect at Home. \" Current as of: August 9, 2016 Content Version: 11.2 © 0100-8787 [employer]. Care instructions adapted under license by [employer] (which disclaims liability or warranty for this information). If you have questions about a medical condition or this instruction, always ask your healthcare professional. [place] [address] any warranty or liability for your use of this information. Mark Patel. Reji Marie MD, FACS General Surgery at [facility] [address], [facility] [address] Stephanie [address] 
[phone] Fax [phone] Discharge Orders None Introducing [facility]! Dear Fabrizio Bueno: 
Thank you for requesting a [facility] account. Our records indicate that you already have an active [facility] [employer] account. You can access your account anytime at [email or web address]. [employer] Did you know that you can access your hospital and ER discharge instructions at any time in [employer]? You can also review all of your test results from your hospital stay or ER visit. Additional Information If you have questions, please visit the Frequently Asked Questions section of the [employer] website at [email or web address] [email or web address]/. Remember, [facility] is NOT to be used for urgent needs. For medical emergencies, dial 911. Now available from your iPhone and Android! General Information Please provide this summary of care documentation to your next provider. Patient Signature:  ____________________________________________________________ Date:  ____________________________________________________________  
  
Arna Star Provider Signature:  ____________________________________________________________ Date:  ____________________________________________________________

## 2017-04-06 NOTE — DISCHARGE INSTRUCTIONS
Cholecystectomy: What to Expect at [address] [place] [address]  After your surgery, it is normal to feel weak and tired for several days after you return home. Your belly may be swollen. If you had laparoscopic surgery, you may also have pain in your shoulder for about 24 hours. You may have gas or need to burp a lot at first, and a few people get diarrhea. The diarrhea usually goes away in 2 to 4 weeks, but it may last longer. How quickly you recover depends on whether you had a laparoscopic or open surgery. · For a laparoscopic surgery, most people can go back to work or their normal routine in 1 to 2 weeks, but it may take longer, depending on the type of work you do. · For an open surgery, it will probably take 4 to 6 weeks before you get back to your normal routine. This care sheet gives you a general idea about how long it will take for you to recover; however, each person recovers at a different pace. Follow the steps below to get better as quickly as possible. How can you care for yourself at home? Activity  · Rest when you feel tired. Getting enough sleep will help you recover. · Try to walk each day. Start out by walking a little more than you did the day before. Gradually increase the amount you walk. Walking boosts blood flow and helps prevent pneumonia and constipation. · For about 2 weeks, avoid lifting anything that would make you strain. This may include a child, heavy grocery bags and milk containers, a heavy briefcase or backpack, cat litter or dog food bags, or a vacuum . · Avoid strenuous activities, such as biking, jogging, weightlifting, and aerobic exercise, until your doctor says it is okay. · You may shower 24 hours after surgery, if your doctor okays it. Pat the cuts (incisions) dry. Do not take a bath for the first 2 weeks and until seen by your doctor.   · You may drive when you are no longer taking pain medicine and can quickly move your foot from the gas pedal to the brake. You must also be able to sit comfortably for a long period of time, even if you do not plan to go far because you might get caught in traffic. For a laparoscopic surgery, most people can go back to work or their normal routine in 1 to 2 weeks, but it may take longer. For an open surgery, it will probably take 4 to 6 weeks before you get back to your normal routine. Diet  · Eat smaller meals more often instead of fewer larger meals. You can eat a normal diet. If your stomach is upset, try bland, low-fat foods like plain rice, broiled chicken, toast, and yogurt, and avoid eating fatty foods for about 1 month. Fatty foods include hamburger, whole milk, cheese, and many snack foods. · Drink plenty of fluids (unless your doctor tells you not to). · If you have diarrhea, try avoiding spicy foods, dairy products, fatty foods, and alcohol. You can also watch to see if specific foods cause it, and stop eating them. If the diarrhea continues for more than 2 weeks, talk to your doctor. · You may notice that your bowel movements are not regular right after your surgery. This is common. Try to avoid constipation and straining with bowel movements. You may want to take a fiber supplement every day. If you have not had a bowel movement after a couple of days, ask your doctor about taking a mild laxative. Medicines  · You can restart your medicines. You will also give you instructions about taking any new medicines. · If you take blood thinners, such as warfarin (Coumadin), be sure to talk to your doctor. Your doctor will tell you if and when to start taking those medicines again. Make sure that you understand exactly what your doctor wants you to do. · Take pain medicines exactly as directed. ¨ If the doctor gave you a prescription medicine for pain, take it as prescribed.   ¨ If you are not taking a prescription pain medicine, take an over-the-counter medicine such as acetaminophen (Tylenol), ibuprofen (Advil, Motrin), or naproxen (Aleve). Read and follow all instructions on the label. ¨ Do not take two or more pain medicines at the same time unless the doctor told you to. Many pain medicines contain acetaminophen, which is Tylenol. Too much Tylenol can be harmful. · If you think your pain medicine is making you sick to your stomach:  ¨ Take your medicine after meals (unless your doctor tells you not to). ¨ Ask your doctor for a different pain medicine. Incision care  · Remove your bandages on Saturday, 4/8. You may leave your incisions uncovered. · If you have strips of tape on the incision, or cut, leave the tape on for a week or until it falls off. · After 24 to 48 hours, wash the area daily with warm, soapy water, and pat it dry. · Keep the area clean and dry. You may cover it with a gauze bandage if it weeps or rubs against clothing. Change the bandage every day. Ice  · To reduce swelling and pain, put ice or a cold pack on your belly for 10 to 20 minutes at a time. Do this every 1 to 2 hours. Put a thin cloth between the ice and your skin. Follow-up care is a key part of your treatment and safety. Be sure to make and go to all appointments, and call your doctor if you are having problems. It's also a good idea to know your test results and keep a list of the medicines you take. When should you call for help? Call 911 anytime you think you may need emergency care. For example, call if:  · You passed out (lost consciousness). · You have severe trouble breathing. · You have sudden chest pain and shortness of breath, or you cough up blood. Call your doctor now or seek immediate medical care if:  · You are sick to your stomach and cannot drink fluids. · You have abdominal pain that does not get better when you take your pain medicine. · You have signs of infection, such as:  ¨ Increased pain, swelling, warmth, or redness. ¨ Red streaks leading from the incision.   ¨ Pus draining from the incision. ¨ Swollen lymph nodes in your neck, armpits, or groin. ¨ A fever >101. · Your urine turns dark brown or your stool is light-colored or romeo-colored. · Your skin or the whites of your eyes turn yellow. · Bright red blood has soaked through a large bandage over your incision. · You have signs of a blood clot, such as:  ¨ Pain in your calf, back of knee, thigh, or groin. ¨ Redness and swelling in your leg or groin. · You have trouble passing urine or stool, especially if you have mild pain or swelling in your lower belly. Watch closely for any changes in your health, and be sure to contact your doctor if:  · You had a laparoscopic surgery and your shoulder pain lasts more than 24 hours. · You do not have a bowel movement after taking a laxative. Where can you learn more? Go to http://luis daniel-rashawn.info/. Enter 217 36 [phone] in the search box to learn more about \"Cholecystectomy: What to Expect at Home. \"  Current as of: August 9, 2016  Content Version: 11.2  © 6171-4995 [employer]. Care instructions adapted under license by [employer] (which disclaims liability or warranty for this information). If you have questions about a medical condition or this instruction, always ask your healthcare professional. [place] [address] any warranty or liability for your use of this information. Hilary Fung.  Rafael Del Cid MD, FACS  General Surgery at [address] [place], [place] [address] [address] [place] [address] [address]  [phone]  Fax [phone]

## 2017-04-06 NOTE — H&P (VIEW-ONLY)
Surgery History and Physical    Subjective:      Christopher Gibson is a 29 y.o. black female who presents for evaluation of RUQ pain and gallstones. Ms. Linda Mitchell has a h/o gallstones and has been seen by 2 different surgeons back in 2013. She was scheduled for surgery and cancelled twice because she is anxious. She continues to have postprandial RUQ pain, especially with fried foods. The pain is now radiating around her belly to her back. It is also occurring when she is not eating. She belches which relieves some of her pain. She denies any n/v, fever, diarrhea, or jaundice. She has no h/o PUD, pancreatitis, liver disease, IBD, or IBS. She has not had abdominal surgery previously. Past Medical History:   Diagnosis Date    Abnormal pap 3/2015; 4/2016 2015 LGSIL +HPV; 2013 neg pap +HPV;4/2016 neg pap +HPV    Depression     Eczema     H/O colposcopy with cervical biopsy 3/15 + 10/2013    neg    Morbid obesity ([address]     Multiple sclerosis ([address]     Psychiatric disorder     depression    Seizures ([address]      Past Surgical History:   Procedure Laterality Date    HX BUNIONECTOMY      HX COLPOSCOPY  6/2016    neg    HX GYN      HX LEEP PROCEDURE  6/08    neg    HX ORTHOPAEDIC  2012    bunionectomy, L      Family History   Problem Relation Age of Onset    Diabetes Mother     Hypertension Mother     MS Mother     Cancer Mother      breast    Bipolar Disorder Father     No Known Problems Sister     Breast Cancer Maternal Grandmother      Social History   Substance Use Topics    Smoking status: Never Smoker    Smokeless tobacco: Never Used    Alcohol use Yes      Comment: occasionally      Prior to Admission medications    Medication Sig Start Date End Date Taking? Authorizing Provider   citalopram (CELEXA) 40 mg tablet Take 1.5 Tabs by mouth every evening.  Indications: patient takes a pill and a half 11/28/16  Yes Spencer Espinoza MD   levETIRAcetam (KEPPRA) 500 mg tablet Take 1 Tab by mouth two (2) times a day. 8/20/16  Yes Historical Provider   LUXIQ 0.12 % topical foam Apply 1 Applicator to affected area every month. 8/17/16  Yes Historical Provider   dimethyl fumarate 240 mg cpDR Take  by mouth two (2) times a day. Yes Historical Provider   norethindrone-ethinyl estradiol (CYCLAFEM 1/35, 28,) 1-35 mg-mcg tab Take 1 Tab by mouth daily. 4/4/16  Yes Gilbert Bethea MD      Allergies   Allergen Reactions    Amoxicillin Hives    Penicillins Unable to Obtain       Review of Systems:  A comprehensive review of systems was negative except for that written in the History of Present Illness. Objective:      Physical Exam:  GENERAL: alert, cooperative, no distress, appears stated age, EYE: negative findings: anicteric sclera, LYMPHATIC: Cervical, supraclavicular nodes normal. , THROAT & NECK: neck supple and symmetrical.  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 Plan:     Ms. Geoffrey Edwards is still anxious, but wants to have her GB removed. I discussed the risks of the procedure including bleeding, infection, wound healing problems, persistent symptoms, reaction to the contrast, prep, or local anesthetic, and injury to the bowel, bile duct, or liver. She understands the risks; any and all questions were answered to her satisfaction. Ms. Geoffrey Edwards will be scheduled for an elective outpatient robotic-assisted laparoscopic cholecystectomy with firefly, possible cholangiogram, possible open under general anesthesia.     Signed By: Marya Roche MD     March 27, 2017

## 2017-04-06 NOTE — PERIOP NOTES
Discharge instructions reviewed with patient's sister alena. Opportunity for clarification and questions provided. RX for percocet given per MD instructions. Pt discharged via w/c in no apparent distress.

## 2017-04-06 NOTE — OP NOTES
Operative Report    Kathy Lange    MRN:  [id number]. Date of Surgery:   4/6/2017     Surgeon:  Gaudencio Wilcox MD.      Assistant:    Mima Yang. Anesthesia:   1. General endotracheal.  2.  0.5% Marcaine. Preoperative Diagnosis:   CHRONIC CALCULOUS CHOLECYSTITIS. Postoperative Diagnosis:   CHRONIC CALCULOUS CHOLECYSTITIS. Procedure:   Procedure(s):  ROBOTIC ASSISTED LAPAROSCOPIC CHOLECYSTECTOMY WITH FIREFLY, POSSIBLE GRAMS, POSSIBLE OPEN. Indication:   Kathy Lange is a 29 yrs black female with a history of right upper quadrant pain for several years and documented gallstones on ultrasound. She has been reluctant to have surgery, but is now experiencing more frequent symptoms. Procedure in Detail:  The patient was seen preoperatively in the holding area. The risks, benefits, and expected outcomes were discussed with the patient, and all questions were answered satisfactorily. The patient concurred with the proposed plan, giving informed consent. The patient was injected with IC green intravenously in the holding area approximately 1 hour prior to the procedure start time. The patient was taken to the Operating Room. The patient was identified as Kathy Lange, and the procedure verified as Robotic Assisted Laparoscopic Cholecystectomy with Firefly, possible Intraoperative Cholangiogram, possible open. The patient was placed on the OR table in the supine position. Prior to the induction of anesthesia, antibiotic prophylaxis was administered. General endotracheal anesthesia was administered and tolerated well. The patient's abdomen was prepped with Chlorprep and draped in the usual sterile fashion. A Time Out was performed, and the above information was confirmed. Using a 15 blade, a transverse incision was made above the umbilicus after injecting the local anesthetic. The abdominal wall was elevated with towel clips.   Using the optiview technique, an 12 mm trocar was introduced into the abdominal cavity. Insufflation was provided through this trocar to establish a pneumoperitoneum of 15 mmHg which the patient tolerated. The camera was inserted through the trocar. The RUQ was visualized, and there were no adhesions noted to prevent a laparoscopic approach. The local anesthetic was injected at all intended trocar sites. Two 8 mm trocars were placed approximately 10 cm lateral to the camera port on either side. A 5 mm trocar was placed in the RUQ along the anterior axillary line for the assistant. The patient was positioned in reverse Trendelenburg and rotated slightly toward the left. The robotic arms were docked. At this time, I scrubbed out and went to the surgeon console. I took control of the robotic arms for the major portion of the procedure. Attention was turned to the right upper quadrant. The liver appeared grossly normal.  The fundus of the gallbladder was grasped and retracted over the dome of the liver. There were no adhesions to the gallbladder. The gallbladder appeared grossly normal.  The infundibulum was grasped and retracted laterally. Using blunt dissection and cautery, the cystic duct was carefully dissected out and clearly visualized entering the gallbladder. The biliary anatomy was confirmed with firefly. The cystic artery was identified posterior to this within Calot's triangle. It was dissected out and clearly visualized entering the gallbladder as well. Once this critical view was obtained, the cystic duct was clipped twice proximally and once distally with Hemolock clips and divided with the endoscissors. The cystic artery was clipped and divided in similar fashion. Traction was then placed on the gallbladder as it was carefully dissected from the liver bed in retrograde fashion with cautery. Hemostasis was obtained along the liver bed as needed. Prior to removing the gallbladder, the RUQ was inspected.   The clips along the cystic duct were in place with no evidence of any bile leakage, and the clips along the cystic artery were in place with no evidence of any bleeding. At this time, I scrubbed back in and went to the patient's bedside. The gallbladder was retrieved intact via an Endocatch bag and pulled out through the supraumbilical incision. A single stone was palpated within the gallbladder. The gallbladder was passed off as a specimen. The fascia at the supraumbilical incision was closed with a 0-0 Vicryl using the Endoclose device. The LUQ and RLQ trocars were removed under direct laparoscopic visualization, and there was no bleeding noted from either site. Hemostasis was obtained within the wounds as needed with cautery. The wounds were irrigated with saline. The skin at all sites was approximated with 4-0 Monocryl in the usual subcuticular fashion. The wounds were cleaned and dried, and steri-strips and Bandaids were applied. The patient was extubated in the room. Estimated Blood Loss:  Less than 25 ml. Sidney Barthel Specimen:     ID Type Source Tests Collected by Time Destination   1 : Gallbladder Preservative Gallbladder  Dominik Lui MD 4/6/2017 1525 Pathology               Findings:   1. A grossly normal-appearing liver. 2.  A grossly normal-appearing gallbladder. 3.  A single large gallstone. Counts: All sponge, needle, and instrument counts were correct x 2. Complications:    None. Disposition:   The patient was transferred to the recovery room in stable room, having tolerated the procedure and anesthesia well.         Signed By: Dominik Lui MD     April 6, 2017            CC:Maral Monk MD

## 2017-04-07 ENCOUNTER — TELEPHONE (OUTPATIENT)
Dept: SURGERY | Age: 37
End: 2017-04-07

## 2017-04-10 ENCOUNTER — TELEPHONE (OUTPATIENT)
Dept: SURGERY | Age: 37
End: 2017-04-10

## 2017-04-10 NOTE — TELEPHONE ENCOUNTER
Patient called, has not had a bowel movement since surgery 4/6/17. Advised to take Miralax x3 daily and to increase fluids, also may drink warm prune juice. Patient is taking pain medication alternating it with aleve.  Will call office if symptoms persist.

## 2017-04-18 ENCOUNTER — OFFICE VISIT (OUTPATIENT)
Dept: SURGERY | Age: 37
End: 2017-04-18

## 2017-04-18 VITALS
RESPIRATION RATE: 13 BRPM | HEIGHT: 67 IN | BODY MASS INDEX: 40.49 KG/M2 | OXYGEN SATURATION: 97 % | DIASTOLIC BLOOD PRESSURE: 68 MMHG | WEIGHT: 258 LBS | SYSTOLIC BLOOD PRESSURE: 110 MMHG | HEART RATE: 83 BPM | TEMPERATURE: 98.5 F

## 2017-04-18 DIAGNOSIS — Z90.49 S/P LAPAROSCOPIC CHOLECYSTECTOMY: Primary | ICD-10-CM

## 2017-04-18 PROBLEM — K80.10 CHRONIC CALCULOUS CHOLECYSTITIS: Status: RESOLVED | Noted: 2017-03-27 | Resolved: 2017-04-18

## 2017-04-18 NOTE — PROGRESS NOTES
1. Have you been to the ER, urgent care clinic since your last visit? Hospitalized since your last visit?no    2. Have you seen or consulted any other health care providers outside of the [address] [place] [address] since your last visit? Include any pap smears or colon screening.  no

## 2017-04-18 NOTE — PROGRESS NOTES
Subjective:      Mikey Frankel is a 29 y.o. black female presents for postop care 2 weeks following a lap dannielle. Ms. Jason Miles is feeling much better. Her appetite is good, and she is eating a regular diet without difficulty. Her bowel movements are regular without diarrhea. She is not having any further pain. Her incisions are healing fine. Objective:     Visit Vitals    /68 (BP 1 Location: Left arm, BP Patient Position: Sitting)    Pulse 83    Temp 98.5 °F (36.9 °C) (Oral)    Resp 13    Ht 5' 7\" (1.702 m)    Wt 258 lb (117 kg)    LMP 03/21/2017 (Approximate)    SpO2 97%    BMI 40.41 kg/m2       General:  alert, cooperative, no distress   Abdomen:  Soft, NT, ND. The incisions are clean, dry, and intact with no erythema. HEENT:   Sclerae are anicteric. Assessment:     1st POV, s/p robotic-assisted lap dannielle with firefly. Plan:     The pathology report was discussed with Ms. Jason Miles. She is doing great and feeling better. She can resume activity as desired. She can f/u on an as needed basis.

## 2017-04-25 ENCOUNTER — OFFICE VISIT (OUTPATIENT)
Dept: OBGYN CLINIC | Age: 37
End: 2017-04-25

## 2017-04-25 VITALS
BODY MASS INDEX: 40.49 KG/M2 | WEIGHT: 258 LBS | HEIGHT: 67 IN | DIASTOLIC BLOOD PRESSURE: 78 MMHG | SYSTOLIC BLOOD PRESSURE: 118 MMHG

## 2017-04-25 DIAGNOSIS — N92.0 MENORRHAGIA WITH REGULAR CYCLE: Primary | ICD-10-CM

## 2017-04-25 NOTE — PROGRESS NOTES
Abnormal bleeding note      Hawa Guo is a 29 y.o. female who complains of vaginal bleeding problems. Her current method of family planning is OCP (estrogen/progesterone). She developed this problem several months ago. She was dx with anemia and referred by her PCP. She will be starting iron supplements, hgb was 12.0 on 3/31/17. She has had vaginal bleeding which she describes as heavy lasting her entire cycle. On further questioning, patient uses only pads, wears 2 at a time because she is afraid of leaking. BUT, she is only changing the pad 2x/day. Pad is not soaked. Pad or tampon count: Patient wears 2 super pads at the same time and uses 6 daily - changes 2-3x/day    Associated symptoms include none    Alleviating factors: none    Aggravating factors: none      The patient is not sexually active. Last Pap smear:was abnormal, LGSIL/+HPV April 2016. Colposcopy done June 2016, benign results. Her relevant past medical history:   Past Medical History:   Diagnosis Date    Abnormal pap 3/2015; 4/2016 2015 LGSIL +HPV; 2013 neg pap +HPV;4/2016 neg pap +HPV    Depression     Eczema     H/O colposcopy with cervical biopsy 3/15 + 10/2013    neg    Morbid obesity ([address]     Multiple sclerosis ([address]     Psychiatric disorder     depression    Seizures ([address]         Past Surgical History:   Procedure Laterality Date    HX BUNIONECTOMY      HX CHOLECYSTECTOMY      Robotic-assisted laparoscopic cholecystectomy with firefly.  HX COLPOSCOPY  6/2016    neg    HX GYN      HX LEEP PROCEDURE  6/08    neg    HX ORTHOPAEDIC Left 2012    Left bunionectomy.  HX ORTHOPAEDIC Right     Right bunionectomy. Social History     Occupational History    Not on file.      Social History Main Topics    Smoking status: Never Smoker    Smokeless tobacco: Never Used    Alcohol use Yes      Comment: occasionally    Drug use: No    Sexual activity: No     Family History   Problem Relation Age of Onset    Diabetes Mother     Hypertension Mother     MS Mother     Cancer Mother      breast    Bipolar Disorder Father     No Known Problems Sister     Breast Cancer Maternal Grandmother        Allergies   Allergen Reactions    Amoxicillin Hives    Penicillins Hives     Prior to Admission medications    Medication Sig Start Date End Date Taking? Authorizing Provider   dimethyl fumarate (TECFIDERA) 240 mg cpDR Take  by mouth. Yes Historical Provider   citalopram (CELEXA) 40 mg tablet Take 1.5 Tabs by mouth every evening. Indications: patient takes a pill and a half 11/28/16  Yes Huang Luo MD   levETIRAcetam (KEPPRA) 500 mg tablet Take 1 Tab by mouth two (2) times a day. 8/20/16  Yes Historical Provider   LUXIQ 0.12 % topical foam Apply 1 Applicator to affected area every month. 8/17/16  Yes Historical Provider   norethindrone-ethinyl estradiol (CYCLAFEM 1/35, 28,) 1-35 mg-mcg tab Take 1 Tab by mouth daily. 4/4/16  Yes Tesha Davison MD   iron bisgly,ps-FA-B-C#12-succ (IROSPAN 24/6) 65 mg-65 mg -1,000 mcg (24) tab Take 1 Tab by mouth daily. 4/7/17   Huang Luo MD   oxyCODONE-acetaminophen (PERCOCET) 5-325 mg per tablet Take 1 Tab by mouth every four (4) hours as needed for Pain. Max Daily Amount: 6 Tabs.  4/6/17   Ronaldo Branham MD        Review of Systems - History obtained from the patient  Constitutional: negative for weight loss, fever, night sweats  HEENT: negative for hearing loss, earache, congestion, snoring, sorethroat  CV: negative for chest pain, palpitations, edema  Resp: negative for cough, shortness of breath, wheezing  Breast: negative for breast lumps, nipple discharge, galactorrhea  GI: negative for change in bowel habits, abdominal pain, black or bloody stools  : negative for frequency, dysuria, hematuria  MSK: negative for back pain, joint pain, muscle pain  Skin: negative for itching, rash, hives  Neuro: negative for dizziness, headache, confusion, weakness  Psych: negative for anxiety, depression, change in mood  Heme/lymph: negative for bleeding, bruising, pallor      Objective:    Visit Vitals    /78    Ht 5' 7\" (1.702 m)    Wt 258 lb (117 kg)    LMP 04/19/2017 (Approximate)    BMI 40.41 kg/m2          PHYSICAL EXAMINATION    Constitutional  · Appearance: well-nourished, well developed, alert, in no acute distress    HENT  · Head and Face: appears normal    Neck  · Inspection/Palpation: normal appearance, no masses or tenderness  · Lymph Nodes: no lymphadenopathy present  · Thyroid: gland size normal, nontender, no nodules or masses present on palpation  ·   Breasts  · Inspection of Breasts: breasts symmetrical, no skin changes, no discharge present, nipple appearance normal, no skin retraction present  · Palpation of Breasts and Axillae: no masses present on palpation, no breast tenderness  · Axillary Lymph Nodes: no lymphadenopathy present    Gastrointestinal  · Abdominal Examination: abdomen non-tender to palpation, normal bowel sounds, no masses present  · Liver and spleen: no hepatomegaly present, spleen not palpable  · Hernias: no hernias identified    Genitourinary  · External Genitalia: normal appearance for age, no discharge present, no tenderness present, no inflammatory lesions present, no masses present, no atrophy present  · Vagina: normal vaginal vault without central or paravaginal defects, no discharge present, no inflammatory lesions present, no masses present  · Bladder: non-tender to palpation  · Urethra: appears normal  · Cervix: normal   · Uterus: normal size, shape and consistency  · Adnexa: no adnexal tenderness present, no adnexal masses present  · Perineum: perineum within normal limits, no evidence of trauma, no rashes or skin lesions present  · Anus: anus within normal limits, no hemorrhoids present  · Inguinal Lymph Nodes: no lymphadenopathy present    Skin  · General Inspection: no rash, no lesions identified    Neurologic/Psychiatric  · Mental Status:  · Orientation: grossly oriented to person, place and time  · Mood and Affect: mood normal, affect appropriate    Assessment:   Menorrhagia well controlled with OC  hgb normal  Plan:   Cont iron to build stores - ferritin barely abnormal  Cont OC  FU for pap - abnl last year        Instructions given to pt. Handouts given to pt.

## 2017-04-25 NOTE — PATIENT INSTRUCTIONS
Pelvic Exam: Care Instructions  Your Care Instructions    When your doctor examines all of your pelvic organs, it's called a pelvic exam. Two good reasons to have this kind of exam are to check for sexually transmitted infections (STIs) and to get a Pap test. A Pap test is also called a Pap smear. It checks for early changes that can lead to cancer of the cervix. Sometimes a pelvic exam is part of a regular checkup. In this case, you can do some things to make your test results as accurate as possible. · Try to schedule the exam when you don't have your period. · Don't use douches, tampons, or vaginal medicines, sprays, or powders for 24 hours before your exam.  · Don't have sex for 24 hours before your exam.  Other times, women have this kind of exam at any time of the month. This is because they have pelvic pain, bleeding, or discharge. Or they may have another pelvic problem. Before your exam, it's important to share some information with your doctor. For example, if you are a survivor of rape or sexual abuse, you can talk about any concerns you may have. Your doctor will also want to know if you are pregnant or use birth control. And he or she will want to hear about any problems, surgeries, or procedures you have had in your pelvic area. You will also need to tell your doctor when your last period was. Follow-up care is a key part of your treatment and safety. Be sure to make and go to all appointments, and call your doctor if you are having problems. It's also a good idea to know your test results and keep a list of the medicines you take. How is a pelvic exam done? · During a pelvic exam, you will:  ¨ Take off your clothes below the waist. You will get a paper or cloth cover to put over the lower half of your body. Fred Chamorro on your back on an exam table. Your feet will be raised above you. Stirrups will support your feet. · The doctor will:  Mery Sprout you to relax your knees.  Your knees need to lean out, toward the walls. ¨ Check the opening of your vagina for sores or swelling. ¨ Gently put a tool called a speculum into your vagina. It opens the vagina a little bit. You will feel some pressure. But if you are relaxed, it will not hurt. It lets your doctor see inside the vagina. ¨ Use a small brush, spatula, or swab to get a sample of cells, if you are having a Pap test or culture. The doctor then removes the speculum. ¨ Put on gloves and put one or two fingers of one hand into your vagina. The other hand goes on your lower belly. This lets your doctor feel your pelvic organs. You will probably feel some pressure. Try to stay relaxed. ¨ Put one gloved finger into your rectum and one into your vagina, if needed. This can also help check your pelvic organs. This exam takes about 10 minutes. At the end, you will get a washcloth or tissue to clean your vaginal area. It's normal to have some discharge after this exam. You can then get dressed. Some test results may be ready right away. But results from a culture or a Pap test may take several days or a few weeks. Why should you have a pelvic exam?  · You want to have recommended screening tests. This includes a Pap test.  · You think you have a vaginal infection. Signs include itching, burning, or unusual discharge. · You might have been exposed to a sexually transmitted infection (STI), such as chlamydia or herpes. · You have vaginal bleeding that is not part of your normal menstrual period. · You have pain in your belly or pelvis. · You have been sexually assaulted. A pelvic exam lets your doctor collect evidence and check for STIs. · You are pregnant. · You are having trouble getting pregnant. What are the risks of a pelvic exam?  There are no risks from a pelvic exam.  When should you call for help?   Watch closely for changes in your health, and be sure to contact your doctor if:  · You have heavy bleeding or discharge from your vagina after the exam.  Where can you learn more? Go to http://luis daniel-rashawn.info/. Enter D531 in the search box to learn more about \"Pelvic Exam: Care Instructions. \"  Current as of: October 13, 2016  Content Version: 11.2  © 0295-6949 [employer]. Care instructions adapted under license by [employer] (which disclaims liability or warranty for this information). If you have questions about a medical condition or this instruction, always ask your healthcare professional. [place] [address] any warranty or liability for your use of this information.

## 2017-05-16 ENCOUNTER — OFFICE VISIT (OUTPATIENT)
Dept: OBGYN CLINIC | Age: 37
End: 2017-05-16

## 2017-05-16 VITALS
SYSTOLIC BLOOD PRESSURE: 122 MMHG | HEIGHT: 67 IN | BODY MASS INDEX: 41.25 KG/M2 | WEIGHT: 262.8 LBS | DIASTOLIC BLOOD PRESSURE: 70 MMHG

## 2017-05-16 DIAGNOSIS — Z11.51 SPECIAL SCREENING EXAMINATION FOR HUMAN PAPILLOMAVIRUS (HPV): ICD-10-CM

## 2017-05-16 DIAGNOSIS — Z01.419 ENCOUNTER FOR GYNECOLOGICAL EXAMINATION WITHOUT ABNORMAL FINDING: Primary | ICD-10-CM

## 2017-05-16 NOTE — PROGRESS NOTES
Rochelle Camacho is a [de-identified] P5,  29 y.o. female [address] whose Patient's last menstrual period was 04/19/2017 (approximate). who presents for her annual checkup. She is having no significant problems. With regard to the Gardisil vaccine, she is older than the FDA approved age to receive it. Menstrual status:    Her periods are moderate in flow. She is using three to five pads or tampons per day, usually regular every 26-30 days. She denies dysmenorrhea. She reports no premenstrual symptoms. Contraception:    The current method of family planning is OCP (estrogen/progesterone) and She declines contraception and counseling. Sexual history:    She  reports that she does not engage in sexual activity. Medical conditions:    Since her last annual GYN exam about one year ago, she has not the following changes in her health history: none. Pap and Mammogram History:    Her most recent Pap smear was normal/+HPV obtained 4/4/2016     The patient has never had a mammogram.    The patient does not have a family history of breast cancer. Past Medical History:   Diagnosis Date    Abnormal pap 3/2015; 4/2016 2015 LGSIL +HPV; 2013 neg pap +HPV;4/2016 neg pap +HPV    Depression     Eczema     H/O colposcopy with cervical biopsy 3/15 + 10/2013    neg    Morbid obesity (HCC)     Multiple sclerosis ([address]     Psychiatric disorder     depression    Seizures ([address]      Past Surgical History:   Procedure Laterality Date    HX BUNIONECTOMY      HX CHOLECYSTECTOMY      Robotic-assisted laparoscopic cholecystectomy with firefly.  HX COLPOSCOPY  6/2016    neg    HX GYN      HX LEEP PROCEDURE  6/08    neg    HX ORTHOPAEDIC Left 2012    Left bunionectomy.  HX ORTHOPAEDIC Right     Right bunionectomy. Current Outpatient Prescriptions   Medication Sig Dispense Refill    dimethyl fumarate (TECFIDERA) 240 mg cpDR Take  by mouth.       citalopram (CELEXA) 40 mg tablet Take 1.5 Tabs by mouth every evening. Indications: patient takes a pill and a half 135 Tab 3    levETIRAcetam (KEPPRA) 500 mg tablet Take 1 Tab by mouth two (2) times a day.  LUXIQ 0.12 % topical foam Apply 1 Applicator to affected area every month.  norethindrone-ethinyl estradiol (CYCLAFEM 1/35, 28,) 1-35 mg-mcg tab Take 1 Tab by mouth daily. 84 Tab 4     Allergies: Amoxicillin and Penicillins   Social History     Social History    Marital status: SINGLE     Spouse name: N/A    Number of children: N/A    Years of education: N/A     Occupational History    Not on file. Social History Main Topics    Smoking status: Never Smoker    Smokeless tobacco: Never Used    Alcohol use Yes      Comment: occasionally    Drug use: No    Sexual activity: No     Other Topics Concern    Not on file     Social History Narrative     Tobacco History:  reports that she has never smoked. She has never used smokeless tobacco.  Alcohol Abuse:  reports that she drinks alcohol. Drug Abuse:  reports that she does not use illicit drugs.     Patient Active Problem List   Diagnosis Code    Depression, major, single episode, moderate ([address] F32.1    Seizure ([address] R56.9    MS (multiple sclerosis) [facility] G35    History of cervical dysplasia Z87.410    Intrinsic eczema L20.84    Fatigue due to depression F32.9, R53.83    Obesity, Class II, BMI 35-39.9 (formerly [facility]) E66.9    S/P laparoscopic cholecystectomy Z90.49       Review of Systems - History obtained from the patient  Constitutional: negative for weight loss, fever, night sweats  HEENT: negative for hearing loss, earache, congestion, snoring, sorethroat  CV: negative for chest pain, palpitations, edema  Resp: negative for cough, shortness of breath, wheezing  GI: negative for change in bowel habits, abdominal pain, black or bloody stools  : negative for frequency, dysuria, hematuria, vaginal discharge  MSK: negative for back pain, joint pain, muscle pain  Breast: negative for breast lumps, nipple discharge, galactorrhea  Skin :negative for itching, rash, hives  Neuro: negative for dizziness, headache, confusion, weakness  Psych: negative for anxiety, depression, change in mood  Heme/lymph: negative for bleeding, bruising, pallor    Physical Exam    Visit Vitals    /70    Ht 5' 7\" (1.702 m)    Wt 262 lb 12.8 oz (119.2 kg)    LMP 04/19/2017 (Approximate)    BMI 41.16 kg/m2       Constitutional  · Appearance: well-nourished, well developed, alert, in no acute distress    HENT  · Head and Face: appears normal    Neck  · Inspection/Palpation: normal appearance, no masses or tenderness  · Lymph Nodes: no lymphadenopathy present  · Thyroid: gland size normal, nontender, no nodules or masses present on palpation    Chest  · Respiratory Effort: breathing normal  · Auscultation: normal breath sounds    Cardiovascular  · Heart:  · Auscultation: regular rate and rhythm without murmur    Breasts  · Inspection of Breasts: breasts symmetrical, no skin changes, no discharge present, nipple appearance normal, no skin retraction present  · Palpation of Breasts and Axillae: no masses present on palpation, no breast tenderness  · Axillary Lymph Nodes: no lymphadenopathy present    Gastrointestinal  · Abdominal Examination: abdomen non-tender to palpation, normal bowel sounds, no masses present  · Liver and spleen: no hepatomegaly present, spleen not palpable  · Hernias: no hernias identified    Genitourinary  · External Genitalia: normal appearance for age, no discharge present, no tenderness present, no inflammatory lesions present, no masses present, no atrophy present  · Vagina: normal vaginal vault without central or paravaginal defects, no discharge present, no inflammatory lesions present, no masses present  · Bladder: non-tender to palpation  · Urethra: appears normal  · Cervix: normal   · Uterus: normal size, shape and consistency  · Adnexa: no adnexal tenderness present, no adnexal masses present  · Perineum: perineum within normal limits, no evidence of trauma, no rashes or skin lesions present  · Anus: anus within normal limits, no hemorrhoids present  · Inguinal Lymph Nodes: no lymphadenopathy present    Skin  · General Inspection: no rash, no lesions identified    Neurologic/Psychiatric  · Mental Status:  · Orientation: grossly oriented to person, place and time  · Mood and Affect: mood normal, affect appropriate    . Assessment:  Routine gynecologic examination  Her current medical status is satisfactory with no evidence of significant gynecologic issues.   Hx of HPV pos, neg colpo 2016    Plan:  Counseled re: diet, exercise, healthy lifestyle  Return for yearly wellness visits  Pap/HPV - colpo if abnl  mammo - occ shooting pain in right breast

## 2017-05-20 LAB
CYTOLOGIST CVX/VAG CYTO: ABNORMAL
CYTOLOGY CVX/VAG DOC THIN PREP: ABNORMAL
CYTOLOGY HISTORY:: ABNORMAL
DX ICD CODE: ABNORMAL
HPV I/H RISK 1 DNA CVX QL PROBE+SIG AMP: POSITIVE
HPV16 DNA CVX QL PROBE+SIG AMP: NEGATIVE
HPV18 DNA CVX QL PROBE+SIG AMP: NEGATIVE
Lab: ABNORMAL
OTHER STN SPEC: ABNORMAL
PATH REPORT.FINAL DX SPEC: ABNORMAL
STAT OF ADQ CVX/VAG CYTO-IMP: ABNORMAL

## 2017-05-24 NOTE — PROGRESS NOTES
Patient notified by message attached to lab. I chava messaged patient to schedule a colpo. Updated pmh and tickler.

## 2017-06-08 ENCOUNTER — HOSPITAL ENCOUNTER (OUTPATIENT)
Dept: LAB | Age: 37
Discharge: HOME | End: 2017-06-08

## 2017-06-08 ENCOUNTER — OFFICE VISIT (OUTPATIENT)
Dept: OBGYN CLINIC | Age: 37
End: 2017-06-08

## 2017-06-08 VITALS
BODY MASS INDEX: 41.94 KG/M2 | SYSTOLIC BLOOD PRESSURE: 120 MMHG | HEIGHT: 67 IN | WEIGHT: 267.2 LBS | DIASTOLIC BLOOD PRESSURE: 80 MMHG

## 2017-06-08 DIAGNOSIS — R87.810 PAPANICOLAOU SMEAR OF CERVIX WITH POSITIVE HIGH RISK HUMAN PAPILLOMA VIRUS (HPV) TEST: Primary | ICD-10-CM

## 2017-06-08 NOTE — PATIENT INSTRUCTIONS
Colposcopy: What to Expect at [address] may feel some soreness in your vagina for a day or two if you had a biopsy. Some vaginal bleeding or discharge is normal for up to a week after a biopsy. The discharge may be dark-colored if a solution was put on your cervix. You can use a sanitary pad for the bleeding. It may take a week or two for you to get the test results. This care sheet gives you a general idea about how long it will take for you to recover. But each person recovers at a different pace. Follow the steps below to feel better as quickly as possible. How can you care for yourself at home? Activity  · You can return to work and most daily activities right after the test.  Exercise  · Do not exercise for 1 day after the test.  Medicines  · Your doctor will tell you if and when you can restart your medicines. He or she will also give you instructions about taking any new medicines. · If you take blood thinners, such as warfarin (Coumadin), clopidogrel (Plavix), or aspirin, be sure to talk to your doctor. He or she will tell you if and when to start taking those medicines again. Make sure that you understand exactly what your doctor wants you to do. · Take an over-the-counter pain medicine, such as acetaminophen (Tylenol), ibuprofen (Advil, Motrin), or naproxen (Aleve). Be safe with medicines. Read and follow all instructions on the label. Do not take two or more pain medicines at the same time unless the doctor told you to. Many pain medicines have acetaminophen, which is Tylenol. Too much acetaminophen (Tylenol) can be harmful. Other instructions  · Use a pad if you have some bleeding. · Do not douche, have sexual intercourse, or use tampons for 1 week if you had a biopsy. This will allow time for your cervix to heal.  · You can take a bath or shower anytime after the test.  Follow-up care is a key part of your treatment and safety.  Be sure to make and go to all appointments, and call your doctor if you are having problems. It's also a good idea to know your test results and keep a list of the medicines you take. When should you call for help? Call your doctor now or seek immediate medical care if:  · You have severe vaginal bleeding. This means that you are soaking through your usual pads or tampons each hour for 2 or more hours. · You have pain that does not get better after you take pain medicine. · You have signs of infection, such as:  ¨ Increased pain. ¨ Bad-smelling vaginal discharge. ¨ A fever. Watch closely for any changes in your health, and be sure to contact your doctor if:  · You have questions or concerns. Where can you learn more? Go to http://luis daniel-rashawn.info/. Enter M523 in the search box to learn more about \"Colposcopy: What to Expect at Home. \"  Current as of: July 26, 2016  Content Version: 11.2  © 3645-7665 [employer]. Care instructions adapted under license by [employer] (which disclaims liability or warranty for this information). If you have questions about a medical condition or this instruction, always ask your healthcare professional. [place] [address] any warranty or liability for your use of this information.

## 2017-06-08 NOTE — PROGRESS NOTES
[facility]  OFFICE PROCEDURE PROGRESS NOTE        Chart reviewed for the following:   Dionicio Orta LPN, have reviewed the History, Physical and updated the Allergic reactions for [address] performed immediately prior to start of procedure:   Dionicio Orta LPN, have performed the following reviews on [facility] prior to the start of the procedure:            * Patient was identified by name and date of birth   * Agreement on procedure being performed was verified  * Risks and Benefits explained to the patient  * Procedure site verified and marked as necessary  * Patient was positioned for comfort  * Consent was signed and verified     Time: 8:27 AM        Date of procedure: 6/8/2017    Procedure performed by:  Prerna Leon MD    How tolerated by patient: tolerated the procedure well with no complications    Post Procedural Pain Scale: 2 - Hurts Little Bit    Comments: none    Procedure Note: Colposcopy    Bri Angel is a [de-identified] P5,  29 y.o. female [address] whose Patient's last menstrual period was 05/20/2017 (approximate). was on 5/20/2017. She presents for colposcopy for evaluation of a cervical abnormality with a pap smear abnormality consisting of 5/16/17 +HPV and neg colposcopy on 6/10/16, +HPV 4/4/16. After being presented with the risks, benefits and alternatives has she signed a consent for the procedure. She states that she understands the need for the procedure and has no further questions. She was informed that she may experience discomfort. Procedure:  She was positioned in the dorsal lithotomy position and a speculum was inserted into the vagina. Dilute acetic acid was applied to the cervix. The colposcope was used to visualize the cervix. Procedure: The transformation zone was completely visualized. Findings: This colposcopy was satisfactory. Biopsies were taken from the cervix . Monsels was applied to the cervix.  Endocervical currettage: An endocervical curettage was performed. Findings:The procedure was notable for normal epithelium on the cervix. Post Procedure Status: The patient tolerated the procedure well with minimal discomfort. A: HPV pos  P: LEEP in OR if indicated.  Needs legs back to see cervix

## 2018-05-22 ENCOUNTER — OFFICE VISIT (OUTPATIENT)
Dept: FAMILY MEDICINE CLINIC | Age: 38
End: 2018-05-22

## 2018-05-22 VITALS
HEART RATE: 107 BPM | WEIGHT: 258 LBS | SYSTOLIC BLOOD PRESSURE: 111 MMHG | HEIGHT: 67 IN | TEMPERATURE: 98.4 F | RESPIRATION RATE: 18 BRPM | BODY MASS INDEX: 40.49 KG/M2 | DIASTOLIC BLOOD PRESSURE: 78 MMHG

## 2018-05-22 DIAGNOSIS — E78.00 HYPERCHOLESTEROLEMIA: ICD-10-CM

## 2018-05-22 DIAGNOSIS — Z00.00 ROUTINE GENERAL MEDICAL EXAMINATION AT A HEALTH CARE FACILITY: Primary | ICD-10-CM

## 2018-05-22 DIAGNOSIS — F32.1 DEPRESSION, MAJOR, SINGLE EPISODE, MODERATE (HCC): ICD-10-CM

## 2018-05-22 DIAGNOSIS — G35 MS (MULTIPLE SCLEROSIS) (HCC): ICD-10-CM

## 2018-05-22 RX ORDER — BENZTROPINE MESYLATE 0.5 MG/1
TABLET ORAL
Refills: 1 | COMMUNITY
Start: 2018-04-24 | End: 2019-06-12 | Stop reason: SDUPTHER

## 2018-05-22 RX ORDER — ARIPIPRAZOLE 10 MG/1
TABLET ORAL
Refills: 1 | COMMUNITY
Start: 2018-02-27 | End: 2019-06-12 | Stop reason: SDUPTHER

## 2018-05-22 NOTE — PROGRESS NOTES
Chief Complaint   Patient presents with    Complete Physical     1. Have you been to the ER, urgent care clinic since your last visit? Yes 04/06/17 gallbladder removal  Hospitalized since your last visit? 2. Have you seen or consulted any other health care providers outside of the [address] [place] [address] since your last visit? Include any pap smears or colon screening. No    There are no preventive care reminders to display for this patient.

## 2018-05-22 NOTE — MR AVS SNAPSHOT
[address] 
[phone] Patient: Darin Claude MRN: [id number] :1980 Visit Information Date & Time Provider Department Dept. Phone Encounter #  
 2018  1:30 PM Amanda Barr 34 [id number] Your Appointments 2018  1:50 PM  
ESTABLISHED PATIENT with Brian Christina MD  
Michael Kolb ([facility]) Appt Note: ae/bcbs; AE, patient R/S due to cycle starting. [address] [facility] [address] [facility] [address]  
[address] Upcoming Health Maintenance Date Due Influenza Age 5 to Adult 2018 PAP AKA CERVICAL CYTOLOGY 2020 DTaP/Tdap/Td series (2 - Td) 9/15/2026 Allergies as of 2018  Review Complete On: 2018 By: Darrel Sampson MD  
  
 Severity Noted Reaction Type Reactions Amoxicillin  2013    Hives Penicillins  2013    Hives Current Immunizations  Reviewed on 9/15/2016 No immunizations on file. Not reviewed this visit You Were Diagnosed With   
  
 Codes Comments Routine general medical examination at a health care facility    -  Primary ICD-10-CM: Z00.00 ICD-9-CM: V70.0 MS (multiple sclerosis) ([address]     ICD-10-CM: G35 
ICD-9-CM: 130 Depression, major, single episode, moderate ([facility]     ICD-10-CM: F32.1 ICD-9-CM: 296.22 Hypercholesterolemia     ICD-10-CM: E78.00 ICD-9-CM: 272.0 Vitals BP Pulse Temp Resp Height(growth percentile) Weight(growth percentile) 111/78 (!) 107 98.4 °F (36.9 °C) (Oral) 18 5' 7\" (1.702 m) 258 lb (117 kg) LMP BMI OB Status Smoking Status 2018 40.41 kg/m2 Having regular periods Never Smoker Vitals History BMI and BSA Data Body Mass Index Body Surface Area  40.41 kg/m 2 2.35 m 2  
  
  
 Preferred Pharmacy Pharmacy Name Phone [facility], [facility] [phone] Your Updated Medication List  
  
   
This list is accurate as of 5/22/18  2:49 PM.  Always use your most recent med list.  
  
  
  
  
 ARIPiprazole 10 mg tablet Commonly known as:  ABILIFY TK 1 T PO D  
  
 benztropine 0.5 mg tablet Commonly known as:  COGENTIN  
TK 1 T PO BID  
  
 levETIRAcetam 500 mg tablet Commonly known as:  KEPPRA Take 1 Tab by mouth two (2) times a day. LUXIQ 0.12 % topical foam  
Generic drug:  betamethasone valerate Apply 1 Applicator to affected area every month. norethindrone-ethinyl estradiol 1-35 mg-mcg Tab Commonly known as:  CYCLAFEM 1/35 (28) Take 1 Tab by mouth daily. TECFIDERA 240 mg Cpdr  
Generic drug:  dimethyl fumarate Take  by mouth.  
  
 vortioxetine 20 mg tablet Commonly known as:  TRINTELLIX Take  by mouth daily. We Performed the Following CBC WITH AUTOMATED DIFF [17523 CPT(R)] HEMOGLOBIN A1C WITH EAG [06861 CPT(R)] LIPID PANEL [87895 CPT(R)] METABOLIC PANEL, COMPREHENSIVE [19120 CPT(R)] Introducing [facility] [place] [facility]! Dear Nato Pino: 
Thank you for requesting a [employer]89 account. Our records indicate that you already have an active [email or web address] account. You can access your account anytime at [email or web address] [email or web address] Right Did you know that you can access your hospital and ER discharge instructions at any time in [employer]ue89? You can also review all of your test results from your hospital stay or ER visit. Additional Information If you have questions, please visit the Frequently Asked Questions section of the [employer]ue89 website at [email or web address] Remember, Rue89 is NOT to be used for urgent needs. For medical emergencies, dial 911. Now available from your iPhone and Android! Please provide this summary of care documentation to your next provider. Your primary care clinician is listed as Isha Flowers. If you have any questions after today's visit, please call [phone].

## 2018-05-22 NOTE — PROGRESS NOTES
Subjective:   Dulce Yeung is a 40 y.o. y.o. female here for her annual routine checkup. She has her annual gyn exam scheduled for next month. Patient's last menstrual period was 05/16/2018. Social History: not sexually active. Pertinent past medical hstory: no history of HTN, DVT, CAD, DM, liver disease, migraines or smoking. Health Habits/Lifestyle  Occupation:  On disability for depression and MS  Household members:  2, patient and her father  Last dental appointment: This year  Last eye exam:  This year  Uses seatbelts regularly :  yes  Getting regular exercise:  no    Patient Active Problem List    Diagnosis Date Noted    S/P laparoscopic cholecystectomy 04/18/2017    Obesity, Class II, BMI 35-39.9 03/27/2017    Fatigue due to depression 09/01/2016    Depression, major, single episode, moderate ([address] 04/15/2016    Se[facility]ure ([facility] 04/15/2016    MS (multiple sclerosis) ([facility] 04/15/2016    History of cervical dysplasia 04/15/2016    Intrinsic eczema 04/15/2016     Current Outpatient Prescriptions   Medication Sig Dispense Refill    benztropine (COGENTIN) 0.5 mg tablet TK 1 T PO BID  1    ARIPiprazole (ABILIFY) 10 mg tablet TK 1 T PO D  1    vortioxetine (TRINTELLIX) 20 mg tablet Take  by mouth daily.  norethindrone-ethinyl estradiol (CYCLAFEM 1/35, 28,) 1-35 mg-mcg tab Take 1 Tab by mouth daily. 84 Tab 4    dimethyl fumarate (TECFIDERA) 240 mg cpDR Take  by mouth.  levETIRAcetam (KEPPRA) 500 mg tablet Take 1 Tab by mouth two (2) times a day.  LUXIQ 0.12 % topical foam Apply 1 Applicator to affected area every month.        Allergies   Allergen Reactions    Amoxicillin Hives    Penicillins Hives     Past Medical History:   Diagnosis Date    Abnormal pap 3/2015; 4/2016; 5/16/17 2015 LGSIL +HPV; 2013 neg pap +HPV;4/2016 neg pap +HPV; 5/16/17 Neg pap +HPV    Depression     Eczema     H/O colposcopy with cervical biopsy 3/15 + 10/2013    neg    Morbid obesity ([facility]     Multiple sclerosis ([facility]     Psychiatric disorder     depression    Seizures ([facility]      Past Surgical History:   Procedure Laterality Date    HX BUNIONECTOMY      HX CHOLECYSTECTOMY      Robotic-assisted laparoscopic cholecystectomy with firefly.  HX COLPOSCOPY  6/2016; 6/8/17    neg    HX GYN      HX LEEP PROCEDURE  6/08    neg    HX ORTHOPAEDIC Left 2012    Left bunionectomy.  HX ORTHOPAEDIC Right     Right bunionectomy. Family History   Problem Relation Age of Onset    Diabetes Mother     Hypertension Mother     MS Mother     Cancer Mother      breast    Bipolar Disorder Father     No Known Problems Sister     Breast Cancer Maternal Grandmother      Social History   Substance Use Topics    Smoking status: Never Smoker    Smokeless tobacco: Never Used    Alcohol use Yes      Comment: occasionally        ROS:  Feeling well. No dyspnea or chest pain on exertion. No abdominal pain, change in bowel habits, black or bloody stools. No urinary tract symptoms. GYN ROS: normal menses, no abnormal bleeding, pelvic pain or discharge, no breast pain or new or enlarging lumps on self exam. No neurological complaints except for leg weakness due to her MS. Objective:  Visit Vitals    /78    Pulse (!) 107    Temp 98.4 °F (36.9 °C) (Oral)    Resp 18    Ht 5' 7\" (1.702 m)    Wt 258 lb (117 kg)    LMP 05/16/2018    BMI 40.41 kg/m2     The patient appears well, alert, oriented x 3, in no distress. ENT normal.  Neck supple. No adenopathy or thyromegaly. ABHILASH. Lungs are clear, good air entry, no wheezes, rhonchi or rales. S1 and S2 normal, no murmurs, regular rate and rhythm. Abdomen soft without tenderness, guarding, mass or organomegaly. Extremities show no edema, normal peripheral pulses. Neurological is normal, no focal findings. BREAST EXAM: deferred to gyn    PELVIC EXAM: deferred to gyn    Assessment/Plan:    ICD-10-CM ICD-9-CM    1.  Routine general medical examination at a health care facility N99.44 K74.6 METABOLIC PANEL, COMPREHENSIVE      CBC WITH AUTOMATED DIFF      HEMOGLOBIN A1C WITH EAG   2. MS (multiple sclerosis) ([facility]) G35 340    3. Depression, major, single episode, moderate ([facility]) F32.1 296.22    4. Hypercholesterolemia X64.40 020.3 METABOLIC PANEL, COMPREHENSIVE      LIPID PANEL         Disabled due to depression and MS  Exercise as able  Labs per orders. Follow-up Disposition:  Return in about 1 year (around 5/22/2019) for physical..      Reviewed plan of care. Patient has provided input and agrees with goals.

## 2018-05-25 LAB
ALBUMIN SERPL-MCNC: 4.5 G/DL (ref 3.5–5.5)
ALBUMIN/GLOB SERPL: 1.5 {RATIO} (ref 1.2–2.2)
ALP SERPL-CCNC: 72 IU/L (ref 39–117)
ALT SERPL-CCNC: 13 IU/L (ref 0–32)
AST SERPL-CCNC: 17 IU/L (ref 0–40)
BASOPHILS # BLD AUTO: 0.1 X10E3/UL (ref 0–0.2)
BASOPHILS NFR BLD AUTO: 1 %
BILIRUB SERPL-MCNC: 0.2 MG/DL (ref 0–1.2)
BUN SERPL-MCNC: 9 MG/DL (ref 6–20)
BUN/CREAT SERPL: 16 (ref 9–23)
CALCIUM SERPL-MCNC: 9.7 MG/DL (ref 8.7–10.2)
CHLORIDE SERPL-SCNC: 102 MMOL/L (ref 96–106)
CHOLEST SERPL-MCNC: 165 MG/DL (ref 100–199)
CO2 SERPL-SCNC: 23 MMOL/L (ref 18–29)
CREAT SERPL-MCNC: 0.58 MG/DL (ref 0.57–1)
EOSINOPHIL # BLD AUTO: 0.1 X10E3/UL (ref 0–0.4)
EOSINOPHIL NFR BLD AUTO: 2 %
ERYTHROCYTE [DISTWIDTH] IN BLOOD BY AUTOMATED COUNT: 15.8 % (ref 12.3–15.4)
EST. AVERAGE GLUCOSE BLD GHB EST-MCNC: 114 MG/DL
GFR SERPLBLD CREATININE-BSD FMLA CKD-EPI: 119 ML/MIN/1.73
GFR SERPLBLD CREATININE-BSD FMLA CKD-EPI: 137 ML/MIN/1.73
GLOBULIN SER CALC-MCNC: 3 G/DL (ref 1.5–4.5)
GLUCOSE SERPL-MCNC: 88 MG/DL (ref 65–99)
HBA1C MFR BLD: 5.6 % (ref 4.8–5.6)
HCT VFR BLD AUTO: 32.4 % (ref 34–46.6)
HDLC SERPL-MCNC: 54 MG/DL
HGB BLD-MCNC: 10.3 G/DL (ref 11.1–15.9)
IMM GRANULOCYTES # BLD: 0 X10E3/UL (ref 0–0.1)
IMM GRANULOCYTES NFR BLD: 0 %
INTERPRETATION, 910389: NORMAL
LDLC SERPL CALC-MCNC: 86 MG/DL (ref 0–99)
LYMPHOCYTES # BLD AUTO: 2.1 X10E3/UL (ref 0.7–3.1)
LYMPHOCYTES NFR BLD AUTO: 29 %
MCH RBC QN AUTO: 24.2 PG (ref 26.6–33)
MCHC RBC AUTO-ENTMCNC: 31.8 G/DL (ref 31.5–35.7)
MCV RBC AUTO: 76 FL (ref 79–97)
MONOCYTES # BLD AUTO: 0.4 X10E3/UL (ref 0.1–0.9)
MONOCYTES NFR BLD AUTO: 6 %
NEUTROPHILS # BLD AUTO: 4.4 X10E3/UL (ref 1.4–7)
NEUTROPHILS NFR BLD AUTO: 62 %
PLATELET # BLD AUTO: 460 X10E3/UL (ref 150–379)
POTASSIUM SERPL-SCNC: 4.2 MMOL/L (ref 3.5–5.2)
PROT SERPL-MCNC: 7.5 G/DL (ref 6–8.5)
RBC # BLD AUTO: 4.26 X10E6/UL (ref 3.77–5.28)
SODIUM SERPL-SCNC: 137 MMOL/L (ref 134–144)
TRIGL SERPL-MCNC: 124 MG/DL (ref 0–149)
VLDLC SERPL CALC-MCNC: 25 MG/DL (ref 5–40)
WBC # BLD AUTO: 7 X10E3/UL (ref 3.4–10.8)

## 2018-06-05 ENCOUNTER — OFFICE VISIT (OUTPATIENT)
Dept: OBGYN CLINIC | Age: 38
End: 2018-06-05

## 2018-06-05 VITALS
BODY MASS INDEX: 41.12 KG/M2 | DIASTOLIC BLOOD PRESSURE: 78 MMHG | HEIGHT: 67 IN | WEIGHT: 262 LBS | SYSTOLIC BLOOD PRESSURE: 130 MMHG

## 2018-06-05 DIAGNOSIS — R87.618 ABNORMAL PAPANICOLAOU SMEAR OF CERVIX WITH POSITIVE HUMAN PAPILLOMA VIRUS (HPV) TEST: ICD-10-CM

## 2018-06-05 DIAGNOSIS — Z01.419 ENCOUNTER FOR GYNECOLOGICAL EXAMINATION WITHOUT ABNORMAL FINDING: Primary | ICD-10-CM

## 2018-06-05 PROBLEM — E66.01 OBESITY, MORBID (HCC): Status: ACTIVE | Noted: 2018-06-05

## 2018-06-05 NOTE — PATIENT INSTRUCTIONS
Breast Self-Exam: Care Instructions  Your Care Instructions    A breast self-exam is when you check your breasts for lumps or changes. This regular exam helps you learn how your breasts normally look and feel. Most breast problems or changes are not because of cancer. Breast self-exam is not a substitute for a mammogram. Having regular breast exams by your doctor and regular mammograms improve your chances of finding any problems with your breasts. Some women set a time each month to do a step-by-step breast self-exam. Other women like a less formal system. They might look at their breasts as they brush their teeth, or feel their breasts once in a while in the shower. If you notice a change in your breast, tell your doctor. Follow-up care is a key part of your treatment and safety. Be sure to make and go to all appointments, and call your doctor if you are having problems. It's also a good idea to know your test results and keep a list of the medicines you take. How do you do a breast self-exam?  · The best time to examine your breasts is usually one week after your menstrual period begins. Your breasts should not be tender then. If you do not have periods, you might do your exam on a day of the month that is easy to remember. · To examine your breasts:  ¨ Remove all your clothes above the waist and lie down. When you are lying down, your breast tissue spreads evenly over your chest wall, which makes it easier to feel all your breast tissue. ¨ Use the pads-not the fingertips-of the 3 middle fingers of your left hand to check your right breast. Move your fingers slowly in small coin-sized circles that overlap. ¨ Use three levels of pressure to feel of all your breast tissue. Use light pressure to feel the tissue close to the skin surface. Use medium pressure to feel a little deeper. Use firm pressure to feel your tissue close to your breastbone and ribs.  Use each pressure level to feel your breast tissue before moving on to the next spot. ¨ Check your entire breast, moving up and down as if following a strip from the collarbone to the bra line, and from the armpit to the ribs. Repeat until you have covered the entire breast.  ¨ Repeat this procedure for your left breast, using the pads of the 3 middle fingers of your right hand. · To examine your breasts while in the shower:  ¨ Place one arm over your head and lightly soap your breast on that side. ¨ Using the pads of your fingers, gently move your hand over your breast (in the strip pattern described above), feeling carefully for any lumps or changes. ¨ Repeat for the other breast.  · Have your doctor inspect anything you notice to see if you need further testing. Where can you learn more? Go to http://luis daniel-rashawn.info/. Enter P148 in the search box to learn more about \"Breast Self-Exam: Care Instructions. \"  Current as of: May 12, 2017  Content Version: 11.4  © 1622-9203 [employer]. Care instructions adapted under license by [employer] (which disclaims liability or warranty for this information). If you have questions about a medical condition or this instruction, always ask your healthcare professional. [place] [address] any warranty or liability for your use of this information.

## 2018-06-05 NOTE — PROGRESS NOTES
Brooke Santiago is a [de-identified] P5,  40 y.o. female [address] whose Patient's last menstrual period was 05/16/2018. who presents for her annual checkup. She is having no significant problems. Now on disability for MS    Menstrual status:    Her periods are normal in flow. She is using three to five pads or tampons per day, usually regular and occur every 26-30 days. She denies dysmenorrhea. She reports no premenstrual symptoms. Contraception:    The current method of family planning is OCP (estrogen/progesterone). Sexual history:    She  reports that she does not engage in sexual activity. Medical conditions:    Since her last annual GYN exam about one year ago, she has not the following changes in her health history: Serina. Pap and Mammogram History:    Her most recent Pap smear was normal pap +HPV -16 -18 obtained 5/16/17; Colpo 6/2017 neg path. The patient has never had a mammogram.    The patient does not have a family history of breast cancer. Past Medical History:   Diagnosis Date    Abnormal pap 3/2015; 4/2016; 5/16/17 2015 LGSIL +HPV; 2013 neg pap +HPV;4/2016 neg pap +HPV; 5/16/17 Neg pap +HPV    Depression     Eczema     H/O colposcopy with cervical biopsy 3/15 + 10/2013    neg    History of seizure 4/15/2016    One in 2008    Hypercholesterolemia 5/22/2018    Morbid obesity ([address]     Multiple sclerosis ([address]     Psychiatric disorder     depression    Seizures ([address]      Past Surgical History:   Procedure Laterality Date    HX BUNIONECTOMY      HX CHOLECYSTECTOMY      Robotic-assisted laparoscopic cholecystectomy with firefly.  HX COLPOSCOPY  6/2016; 6/8/17    neg    HX GYN      HX LEEP PROCEDURE  6/08    neg    HX ORTHOPAEDIC Left 2012    Left bunionectomy.  HX ORTHOPAEDIC Right     Right bunionectomy.        Current Outpatient Prescriptions   Medication Sig Dispense Refill    benztropine (COGENTIN) 0.5 mg tablet TK 1 T PO BID  1    ARIPiprazole (ABILIFY) 10 mg tablet TK 1 T PO D  1    vortioxetine (TRINTELLIX) 20 mg tablet Take  by mouth daily.  norethindrone-ethinyl estradiol (CYCLAFEM 1/35, 28,) 1-35 mg-mcg tab Take 1 Tab by mouth daily. 84 Tab 4    dimethyl fumarate (TECFIDERA) 240 mg cpDR Take  by mouth.  levETIRAcetam (KEPPRA) 500 mg tablet Take 1 Tab by mouth two (2) times a day.  LUXIQ 0.12 % topical foam Apply 1 Applicator to affected area every month. Allergies: Amoxicillin and Penicillins   Social History     Social History    Marital status: SINGLE     Spouse name: N/A    Number of children: N/A    Years of education: N/A     Occupational History    Not on file. Social History Main Topics    Smoking status: Never Smoker    Smokeless tobacco: Never Used    Alcohol use Yes      Comment: occasionally    Drug use: No    Sexual activity: No     Other Topics Concern    Not on file     Social History Narrative     Tobacco History:  reports that she has never smoked. She has never used smokeless tobacco.  Alcohol Abuse:  reports that she drinks alcohol. Drug Abuse:  reports that she does not use illicit drugs.     Patient Active Problem List   Diagnosis Code    Depression, major, single episode, moderate ([facility] F32.1    History of seizure Z87.898    MS (multiple sclerosis) ([facility] G35    History of cervical dysplasia Z87.410    Intrinsic eczema L20.84    Fatigue due to depression F32.9, R53.83    Obesity, Class II, BMI 35-39.9 E66.9    S/P laparoscopic cholecystectomy Z90.49    Hypercholesterolemia E78.00       Review of Systems - History obtained from the patient  Constitutional: negative for weight loss, fever, night sweats  HEENT: negative for hearing loss, earache, congestion, snoring, sorethroat  CV: negative for chest pain, palpitations, edema  Resp: negative for cough, shortness of breath, wheezing  GI: negative for change in bowel habits, abdominal pain, black or bloody stools  : negative for frequency, dysuria, hematuria, vaginal discharge  MSK: negative for back pain, joint pain, muscle pain  Breast: negative for breast lumps, nipple discharge, galactorrhea  Skin :negative for itching, rash, hives  Neuro: negative for dizziness, headache, confusion, weakness  Psych: negative for anxiety, depression, change in mood  Heme/lymph: negative for bleeding, bruising, pallor    Physical Exam    Visit Vitals    /78 (BP 1 Location: Left arm, BP Patient Position: Sitting)    Ht 5' 7\" (1.702 m)    Wt 262 lb (118.8 kg)    LMP 05/16/2018    BMI 41.04 kg/m2       Constitutional  · Appearance: well-nourished, well developed, alert, in no acute distress    HENT  · Head and Face: appears normal    Neck  · Inspection/Palpation: normal appearance, no masses or tenderness  · Lymph Nodes: no lymphadenopathy present  · Thyroid: gland size normal, nontender, no nodules or masses present on palpation    Chest  · Respiratory Effort: breathing normal  · Auscultation: normal breath sounds    Cardiovascular  · Heart:  · Auscultation: regular rate and rhythm without murmur    Breasts  · Inspection of Breasts: breasts symmetrical, no skin changes, no discharge present, nipple appearance normal, no skin retraction present  · Palpation of Breasts and Axillae: no masses present on palpation, no breast tenderness  · Axillary Lymph Nodes: no lymphadenopathy present    Gastrointestinal  · Abdominal Examination: abdomen non-tender to palpation, normal bowel sounds, no masses present  · Liver and spleen: no hepatomegaly present, spleen not palpable  · Hernias: no hernias identified    Genitourinary  · External Genitalia: normal appearance for age, no discharge present, no tenderness present, no inflammatory lesions present, no masses present, no atrophy present  · Vagina: normal vaginal vault without central or paravaginal defects, no discharge present, no inflammatory lesions present, no masses present  · Bladder: non-tender to palpation  · Urethra: appears normal  · Cervix: normal   · Uterus: normal size, shape and consistency  · Adnexa: no adnexal tenderness present, no adnexal masses present  · Perineum: perineum within normal limits, no evidence of trauma, no rashes or skin lesions present  · Anus: anus within normal limits, no hemorrhoids present  · Inguinal Lymph Nodes: no lymphadenopathy present    Skin  · General Inspection: no rash, no lesions identified    Neurologic/Psychiatric  · Mental Status:  · Orientation: grossly oriented to person, place and time  · Mood and Affect: mood normal, affect appropriate    . Assessment:  Routine gynecologic examination  Her current medical status is satisfactory with no evidence of significant gynecologic issues.   HPV pos with neg colpo  MS    Plan:  Counseled re: diet, exercise, healthy lifestyle  Return for yearly wellness visits  Pt counseled regarding co-testing for high risk HPV with pap  Legs back for pap  Cont OC

## 2018-06-08 LAB
CYTOLOGIST CVX/VAG CYTO: NORMAL
CYTOLOGY CVX/VAG DOC THIN PREP: NORMAL
CYTOLOGY HISTORY:: NORMAL
DX ICD CODE: NORMAL
HPV I/H RISK 1 DNA CVX QL PROBE+SIG AMP: NEGATIVE
Lab: NORMAL
OTHER STN SPEC: NORMAL
PATH REPORT.FINAL DX SPEC: NORMAL
STAT OF ADQ CVX/VAG CYTO-IMP: NORMAL

## 2018-06-19 ENCOUNTER — TELEPHONE (OUTPATIENT)
Dept: FAMILY MEDICINE CLINIC | Age: 38
End: 2018-06-19

## 2018-06-20 NOTE — TELEPHONE ENCOUNTER
Please call patient and let her know her hemoglobin is low. She needs to schedule an office visit about this.

## 2018-06-26 ENCOUNTER — OFFICE VISIT (OUTPATIENT)
Dept: FAMILY MEDICINE CLINIC | Age: 38
End: 2018-06-26

## 2018-06-26 VITALS
BODY MASS INDEX: 40.81 KG/M2 | WEIGHT: 260 LBS | RESPIRATION RATE: 18 BRPM | TEMPERATURE: 98.2 F | SYSTOLIC BLOOD PRESSURE: 121 MMHG | DIASTOLIC BLOOD PRESSURE: 85 MMHG | HEIGHT: 67 IN | HEART RATE: 101 BPM

## 2018-06-26 DIAGNOSIS — D64.9 ANEMIA, UNSPECIFIED TYPE: Primary | ICD-10-CM

## 2018-06-26 NOTE — PROGRESS NOTES
HISTORY OF PRESENT ILLNESS  Bri Reyes is a 40 y.o. female. HPI Comments: Glenn Meigs is 40y.o. year old female who presents today for due to a hemoglobin of 10.3 last month  It was 12.3 in 2017. She has a history of iron deficiency anemia due to menorrhagia, however, she is on the pill and her cycles are regular, lasting about 4 days and uses about two pads a day on her heaviest days. Review of Systems   Constitutional: Negative for malaise/fatigue and weight loss. No weight gain   Respiratory: Negative for shortness of breath. Cardiovascular: Negative for chest pain. Gastrointestinal: Negative for abdominal pain, blood in stool, constipation, diarrhea, heartburn, melena, nausea and vomiting. Neurological: Negative for dizziness. Lightheadedness when bending over       Visit Vitals    /85    Pulse (!) 101    Temp 98.2 °F (36.8 °C) (Oral)    Resp 18    Ht 5' 7\" (1.702 m)    Wt 260 lb (117.9 kg)    LMP 06/14/2018    BMI 40.72 kg/m2     Physical Exam   Constitutional: She is oriented to person, place, and time. She appears well-developed and well-nourished. No distress. Cardiovascular: Normal rate, regular rhythm and normal heart sounds. Exam reveals no gallop and no friction rub. No murmur heard. Pulmonary/Chest: Effort normal and breath sounds normal. No respiratory distress. She has no wheezes. She has no rales. Abdominal: Soft. Normal appearance and bowel sounds are normal. She exhibits no distension. There is no hepatosplenomegaly. There is no tenderness. There is no rebound and no guarding. Neurological: She is alert and oriented to person, place, and time. Skin: Skin is warm and dry. She is not diaphoretic. Nursing note and vitals reviewed. ASSESSMENT and PLAN    ICD-10-CM ICD-9-CM    1.  Anemia, unspecified type D64.9 285.9 CBC WITH AUTOMATED DIFF      PATHOLOGIST REVIEW SMEARS      ANEMIA PROFILE A      FERRITIN      REFERRAL TO GASTROENTEROLOGY        Not due to menorrhagia this time  Labs per orders. Up to Date Immunizations:  Gastroenterology referral    Follow-up Disposition:  Return if symptoms worsen or fail to improve. Reviewed plan of care. Patient has provided input and agrees with goals.

## 2018-06-26 NOTE — MR AVS SNAPSHOT
[address] 
[phone] Patient: Jared Galindo MRN: [id number] :1980 Visit Information Date & Time Provider Department Dept. Phone Encounter #  
 2018 10:45 AM aGrdenia Miller[facility] [id number] Upcoming Health Maintenance Date Due Influenza Age 5 to Adult 2018 PAP AKA CERVICAL CYTOLOGY 2021 DTaP/Tdap/Td series (2 - Td) 9/15/2026 Allergies as of 2018  Review Complete On: 2018 By: Gardenia Miller MD  
  
 Severity Noted Reaction Type Reactions Amoxicillin  2013    Hives Penicillins  2013    Hives Current Immunizations  Reviewed on 9/15/2016 No immunizations on file. Not reviewed this visit You Were Diagnosed With   
  
 Codes Comments Anemia, unspecified type    -  Primary ICD-10-CM: D64.9 ICD-9-CM: 864. 9 Vitals BP Pulse Temp Resp Height(growth percentile) Weight(growth percentile) 121/85 (!) 101 98.2 °F (36.8 °C) (Oral) 18 5' 7\" (1.702 m) 260 lb (117.9 kg) LMP BMI OB Status Smoking Status 2018 40.72 kg/m2 Having regular periods Never Smoker Vitals History BMI and BSA Data Body Mass Index Body Surface Area 40.72 kg/m 2 2.36 m 2 Preferred Pharmacy Pharmacy Name Phone [facility] [place], [address] [place] AT [facility] [phone] Your Updated Medication List  
  
   
This list is accurate as of 18 11:26 AM.  Always use your most recent med list.  
  
  
  
  
 ARIPiprazole 10 mg tablet Commonly known as:  ABILIFY TK 1 T PO D  
  
 benztropine 0.5 mg tablet Commonly known as:  COGENTIN  
TK 1 T PO BID  
  
 levETIRAcetam 500 mg tablet Commonly known as:  KEPPRA Take 1 Tab by mouth two (2) times a day.   
  
 LUXIQ 0.12 % topical foam  
 Generic drug:  betamethasone valerate Apply 1 Applicator to affected area every month. norethindrone-ethinyl estradiol 1-35 mg-mcg Tab Commonly known as:  CYCLAFEM 1/35 (28) Take 1 Tab by mouth daily. TECFIDERA 240 mg Cpdr  
Generic drug:  dimethyl fumarate Take  by mouth.  
  
 vortioxetine 20 mg tablet Commonly known as:  TRINTELLIX Take  by mouth daily. We Performed the Following ANEMIA PROFILE A [id number] CPT(R)] CBC WITH AUTOMATED DIFF [50397 CPT(R)] FERRITIN [45331 CPT(R)] PATHOLOGIST REVIEW SMEARS [[id number] Custom] REFERRAL TO GASTROENTEROLOGY [NYI96 Custom] Comments:  
 Anemia Referral Information Referral ID Referred By Referred To  
  
 [id number] [facility] [address] [place], [place] [address] [address] Phone: [phone] Fax: [phone] Visits Status Start Date End Date 1 New Request 6/26/18 6/26/19 If your referral has a status of pending review or denied, additional information will be sent to support the outcome of this decision. Introducing [facility]! Dear Rosalina Kendrick: 
Thank you for requesting a [employer] account. Our records indicate that you already have an active Quick[employer] account. You can access your account anytime at [email or web address] Did you know that you can access your hospital and ER discharge instructions at any time in QuickMobile? You can also review all of your test results from your hospital stay or ER visit. Additional Information If you have questions, please visit the Frequently Asked Questions section of the QuickMobile website at [email or web address] Remember, QuickMobile is NOT to be used for urgent needs. For medical emergencies, dial 911. Now available from your iPhone and Android! Please provide this summary of care documentation to your next provider. Your primary care clinician is listed as Brittany Muniz. If you have any questions after today's visit, please call [phone].

## 2018-06-26 NOTE — PROGRESS NOTES
Chief Complaint   Patient presents with    Abnormal Lab Results     low hemoglobin     1. Have you been to the ER, urgent care clinic since your last visit? No  Hospitalized since your last visit? No     2. Have you seen or consulted any other health care providers outside of the [address] [place] [address] since your last visit? Include any pap smears or colon screening. No      There are no preventive care reminders to display for this patient.

## 2018-06-27 LAB
BASOPHILS # BLD AUTO: 0.1 X10E3/UL (ref 0–0.2)
BASOPHILS NFR BLD AUTO: 2 %
EOSINOPHIL # BLD AUTO: 0.1 X10E3/UL (ref 0–0.4)
EOSINOPHIL NFR BLD AUTO: 1 %
ERYTHROCYTE [DISTWIDTH] IN BLOOD BY AUTOMATED COUNT: 15.6 % (ref 12.3–15.4)
FERRITIN SERPL-MCNC: 7 NG/ML (ref 15–150)
HCT VFR BLD AUTO: 30.9 % (ref 34–46.6)
HGB BLD-MCNC: 9.9 G/DL (ref 11.1–15.9)
IMM GRANULOCYTES # BLD: 0 X10E3/UL (ref 0–0.1)
IMM GRANULOCYTES NFR BLD: 0 %
IRON SATN MFR SERPL: 5 % (ref 15–55)
IRON SERPL-MCNC: 19 UG/DL (ref 27–159)
LYMPHOCYTES # BLD AUTO: 1.7 X10E3/UL (ref 0.7–3.1)
LYMPHOCYTES NFR BLD AUTO: 29 %
MCH RBC QN AUTO: 23.7 PG (ref 26.6–33)
MCHC RBC AUTO-ENTMCNC: 32 G/DL (ref 31.5–35.7)
MCV RBC AUTO: 74 FL (ref 79–97)
MONOCYTES # BLD AUTO: 0.2 X10E3/UL (ref 0.1–0.9)
MONOCYTES NFR BLD AUTO: 4 %
NEUTROPHILS # BLD AUTO: 3.9 X10E3/UL (ref 1.4–7)
NEUTROPHILS NFR BLD AUTO: 64 %
PLATELET # BLD AUTO: 460 X10E3/UL (ref 150–379)
RBC # BLD AUTO: 4.18 X10E6/UL (ref 3.77–5.28)
RETICS/RBC NFR AUTO: 1.4 % (ref 0.6–2.6)
TIBC SERPL-MCNC: 411 UG/DL (ref 250–450)
UIBC SERPL-MCNC: 392 UG/DL (ref 131–425)
WBC # BLD AUTO: 6 X10E3/UL (ref 3.4–10.8)

## 2018-07-03 ENCOUNTER — TELEPHONE (OUTPATIENT)
Dept: FAMILY MEDICINE CLINIC | Age: 38
End: 2018-07-03

## 2018-07-03 NOTE — TELEPHONE ENCOUNTER
Regarding: Update Medical Information  ----- Message from Jeanne Roblero MD sent at 7/3/2018  1:56 PM EDT -----       ----- Message from Ana Pena to Jeanne Roblero MD sent at 6/30/2018  3:23 PM -----   Hello i just want to let the dr know i have shortness of breath. For example just walking up the stairs.  Thanks

## 2018-07-03 NOTE — TELEPHONE ENCOUNTER
Called and spoke with pt, and she denies chest pain or continues shortness of breath. Pt states she only has the shortness of breath during increased activity. Pt offered in about for Thursday, but declined, stating she has an appointment with Dr. Justin avalos next week and will keep that appointment and discuss. Pt advised if chest pain occurs to seek urgent care immediately or if symptoms worsening. Pt states understanding.

## 2018-07-08 ENCOUNTER — TELEPHONE (OUTPATIENT)
Dept: FAMILY MEDICINE CLINIC | Age: 38
End: 2018-07-08

## 2018-07-08 DIAGNOSIS — D50.9 IRON DEFICIENCY ANEMIA, UNSPECIFIED IRON DEFICIENCY ANEMIA TYPE: Primary | ICD-10-CM

## 2018-07-08 DIAGNOSIS — D75.839 THROMBOCYTHEMIA: ICD-10-CM

## 2018-07-08 RX ORDER — LANOLIN ALCOHOL/MO/W.PET/CERES
325 CREAM (GRAM) TOPICAL
COMMUNITY
Start: 2018-07-08

## 2018-07-08 NOTE — LETTER
7/9/2018 9:13 AM 
 
Ms. Reed [address] Dear Ms. Bowlesgisellealina Campos, We have been unable to reach you by phone to notify you of your test results. Please call our office at [phone] and ask to speak with my nurse in order to explain these results to you and advise you of any recommendations.  
 
 
 
Sincerely, 
 
 
Jeanne Roblero MD

## 2018-07-09 DIAGNOSIS — D50.9 IRON DEFICIENCY ANEMIA, UNSPECIFIED IRON DEFICIENCY ANEMIA TYPE: ICD-10-CM

## 2018-07-09 DIAGNOSIS — D75.839 THROMBOCYTHEMIA: ICD-10-CM

## 2018-07-09 NOTE — TELEPHONE ENCOUNTER
Please call patient and let her know her iron stores are quite low. I would like for her to take ferrous sulfate, 325 mg a day and have her labs repeated this week. Does she have an appointment with Gastroenterology yet?

## 2018-07-09 NOTE — TELEPHONE ENCOUNTER
Pt called the office back and has been advised and states understanding of results and plan. Pt states her GI appointment is scheduled for next week.

## 2018-07-09 NOTE — TELEPHONE ENCOUNTER
Called pt, and left a voice message, asking that he/she call the office back in regard to his/her recent lab/test results. A letter has been mailed to pt.

## 2018-08-02 ENCOUNTER — TELEPHONE (OUTPATIENT)
Dept: FAMILY MEDICINE CLINIC | Age: 38
End: 2018-08-02

## 2018-08-02 NOTE — TELEPHONE ENCOUNTER
Called pt, and she states she has starting taking the Iron, but has not had labs drawn, yet, but will.      Pt states she has seen gastro, and is scheduled for a colonoscopy on 08/16/2018

## 2018-08-02 NOTE — TELEPHONE ENCOUNTER
----- Message from Pamela Otero sent at 8/2/2018 12:13 PM EDT -----  Regarding: Dr. Elvira An is returning a call from the office in regards to lab results.  Best contact number: [phone]

## 2018-08-16 LAB — COLONOSCOPY, EXTERNAL: NORMAL

## 2018-08-18 LAB
BASOPHILS # BLD AUTO: 0.1 X10E3/UL (ref 0–0.2)
BASOPHILS NFR BLD AUTO: 1 %
EOSINOPHIL # BLD AUTO: 0.1 X10E3/UL (ref 0–0.4)
EOSINOPHIL NFR BLD AUTO: 1 %
ERYTHROCYTE [DISTWIDTH] IN BLOOD BY AUTOMATED COUNT: 18.5 % (ref 12.3–15.4)
HCT VFR BLD AUTO: 35.2 % (ref 34–46.6)
HGB BLD-MCNC: 11.3 G/DL (ref 11.1–15.9)
IMM GRANULOCYTES # BLD: 0 X10E3/UL (ref 0–0.1)
IMM GRANULOCYTES NFR BLD: 0 %
LYMPHOCYTES # BLD AUTO: 2.2 X10E3/UL (ref 0.7–3.1)
LYMPHOCYTES NFR BLD AUTO: 28 %
MCH RBC QN AUTO: 24.8 PG (ref 26.6–33)
MCHC RBC AUTO-ENTMCNC: 32.1 G/DL (ref 31.5–35.7)
MCV RBC AUTO: 77 FL (ref 79–97)
MONOCYTES # BLD AUTO: 0.5 X10E3/UL (ref 0.1–0.9)
MONOCYTES NFR BLD AUTO: 6 %
NEUTROPHILS # BLD AUTO: 4.9 X10E3/UL (ref 1.4–7)
NEUTROPHILS NFR BLD AUTO: 64 %
PATH INTERP BLD-IMP: ABNORMAL
PATH REV BLD -IMP: ABNORMAL
PATH REV BLD -IMP: ABNORMAL
PATH REV BLD -IMP: NORMAL
PATHOLOGIST NAME: ABNORMAL
PLATELET # BLD AUTO: 461 X10E3/UL (ref 150–379)
RBC # BLD AUTO: 4.56 X10E6/UL (ref 3.77–5.28)
WBC # BLD AUTO: 7.8 X10E3/UL (ref 3.4–10.8)

## 2018-08-26 ENCOUNTER — TELEPHONE (OUTPATIENT)
Dept: FAMILY MEDICINE CLINIC | Age: 38
End: 2018-08-26

## 2018-08-26 DIAGNOSIS — D75.839 THROMBOCYTOSIS: Primary | ICD-10-CM

## 2018-08-27 NOTE — TELEPHONE ENCOUNTER
Please call patient and let her know her anemia is improving, but I would like for her to continue her iron. Unfortunately, her platelets are high, so I would like for her to see hematology.   I have printed a referral.

## 2018-08-27 NOTE — TELEPHONE ENCOUNTER
Pt would like to speak with Katharine Like again, says has more questions about the plan they talked about earlier. [phone]

## 2018-08-27 NOTE — TELEPHONE ENCOUNTER
Per Dr. Mark Hopper I advised patient that her iron has improved to continue taking her iron pill and that her platelets are high, Dr. Mark Hopper referred patient to hematology, per patient referral will be mailed to her and address was verified with patient. Patient verbalized understanding and states that she has no questions or concerns at this time.

## 2018-10-30 ENCOUNTER — OFFICE VISIT (OUTPATIENT)
Dept: ONCOLOGY | Age: 38
End: 2018-10-30

## 2018-10-30 VITALS
DIASTOLIC BLOOD PRESSURE: 82 MMHG | SYSTOLIC BLOOD PRESSURE: 121 MMHG | TEMPERATURE: 95 F | OXYGEN SATURATION: 97 % | WEIGHT: 262.2 LBS | HEART RATE: 99 BPM | HEIGHT: 67 IN | BODY MASS INDEX: 41.15 KG/M2

## 2018-10-30 DIAGNOSIS — D75.839 THROMBOCYTOSIS: ICD-10-CM

## 2018-10-30 DIAGNOSIS — D50.0 IRON DEFICIENCY ANEMIA DUE TO CHRONIC BLOOD LOSS: Primary | ICD-10-CM

## 2018-10-30 DIAGNOSIS — G35 MS (MULTIPLE SCLEROSIS) (HCC): ICD-10-CM

## 2018-10-30 NOTE — PROGRESS NOTES
[address] [facility] Oncology at [facility]  [phone]    Hematology / Oncology Consult    Reason for Visit:   Reyes Wakefield is a 41 y.o. female who is seen in consultation at the request of Dr. Malika Armstrong for evaluation of anemia. History of Present Illness:   Ms. Poornima London is a 42 y/o female with MS, Depression, obesity is referred for evaluation and management of anemia. Review of labs reveals Hgb has ranged 9.9 - 11.3 over the past 6 months. Iron profile and ferritin low in 6/2018. She reports that she takes oral iron pills once a day since 6/2018. This does cause her constipation, but no stomach upset. She denies any heavy periods as she is on OCPs. She underwent EGD, colonoscopy in 8/2018 followed by capsule endoscopy, negative for any source of bleeding. No ice cravings, melena/hematochezia, GERD. Past Medical History:   Diagnosis Date    Abnormal pap 3/2015; 4/2016; 5/16/17 2015 LGSIL +HPV; 2013 neg pap +HPV;4/2016 neg pap +HPV; 5/16/17 Neg pap +HPV    Anemia     Depression     Eczema     H/O colposcopy with cervical biopsy 3/15 + 10/2013    neg    History of seizure 4/15/2016    One in 2008    Hypercholesterolemia 5/22/2018    Morbid obesity ([address]     Multiple sclerosis ([address]     Psychiatric disorder     depression    Seizures (HCC)       Past Surgical History:   Procedure Laterality Date    HX BUNIONECTOMY      HX CHOLECYSTECTOMY      Robotic-assisted laparoscopic cholecystectomy with firefly.  HX COLPOSCOPY  6/2016; 6/8/17    neg    HX GYN      HX LEEP PROCEDURE  6/08    neg    HX ORTHOPAEDIC Left 2012    Left bunionectomy.  HX ORTHOPAEDIC Right     Right bunionectomy.       Social History     Tobacco Use    Smoking status: Never Smoker    Smokeless tobacco: Never Used   Substance Use Topics    Alcohol use: Yes     Comment: occasionally      Family History   Problem Relation Age of Onset    Diabetes Mother     Hypertension Mother    Brandon.Abu MS Mother    Brandon.Abaggie Cancer Mother         breast    Bipolar Disorder Father     No Known Problems Sister     Breast Cancer Maternal Grandmother      Current Outpatient Medications   Medication Sig    norethindrone-ethinyl estradiol (CYCLAFEM 1/35, 28,) 1-35 mg-mcg tab Take 1 Tab by mouth daily.  benztropine (COGENTIN) 0.5 mg tablet TK 1 T PO BID    ARIPiprazole (ABILIFY) 10 mg tablet TK 1 T PO D    vortioxetine (TRINTELLIX) 20 mg tablet Take  by mouth daily.  dimethyl fumarate (TECFIDERA) 240 mg cpDR Take  by mouth two (2) times a day.  ferrous sulfate 325 mg (65 mg iron) tablet Take 1 Tab by mouth Daily (before breakfast).  levETIRAcetam (KEPPRA) 500 mg tablet Take 1 Tab by mouth two (2) times a day. No current facility-administered medications for this visit. Allergies   Allergen Reactions    Amoxicillin Hives    Penicillins Hives        Review of Systems: A complete review of systems was obtained, negative except as described above. Physical Exam:     Visit Vitals  /82 (BP 1 Location: Left arm, BP Patient Position: Sitting)   Pulse 99   Temp 95 °F (35 °C) (Oral)   Ht 5' 7\" (1.702 m)   Wt 262 lb 3.2 oz (118.9 kg)   SpO2 97%   BMI 41.07 kg/m²     ECOG PS: 2  General: Well developed, no acute distress  Eyes: PERRLA, EOMI, anicteric sclerae  HENT: Atraumatic, OP clear, TMs intact without erythema  Neck: Supple  Lymphatic: No cervical, supraclavicular, axillary or inguinal adenopathy  Respiratory: CTAB, normal respiratory effort  CV: Normal rate, regular rhythm, no murmurs, no peripheral edema  GI: Soft, nontender, nondistended, no masses, no hepatomegaly, no splenomegaly  MS: Normal gait walking with a cane. Digits without clubbing or cyanosis. Skin: No rashes, ecchymoses, or petechiae. Normal temperature, turgor, and texture. Neuro/Psych: Alert, oriented. 5/5 strength in all 4 extremities. Appropriate affect, normal judgment/insight.     Results:     Lab Results   Component Value Date/Time    WBC 7.8 08/14/2018 01:57 PM    HGB 11.3 08/14/2018 01:57 PM    HCT 35.2 08/14/2018 01:57 PM    PLATELET 572 (H) 40/66/2860 01:57 PM    MCV 77 (L) 08/14/2018 01:57 PM    ABS. NEUTROPHILS 4.9 08/14/2018 01:57 PM     Lab Results   Component Value Date/Time    Sodium 137 05/24/2018 12:43 PM    Potassium 4.2 05/24/2018 12:43 PM    Chloride 102 05/24/2018 12:43 PM    CO2 23 05/24/2018 12:43 PM    Glucose 88 05/24/2018 12:43 PM    BUN 9 05/24/2018 12:43 PM    Creatinine 0.58 05/24/2018 12:43 PM    GFR est  05/24/2018 12:43 PM    GFR est non- 05/24/2018 12:43 PM    Calcium 9.7 05/24/2018 12:43 PM     Lab Results   Component Value Date/Time    Bilirubin, total 0.2 05/24/2018 12:43 PM    ALT (SGPT) 13 05/24/2018 12:43 PM    AST (SGOT) 17 05/24/2018 12:43 PM    Alk. phosphatase 72 05/24/2018 12:43 PM    Protein, total 7.5 05/24/2018 12:43 PM    Albumin 4.5 05/24/2018 12:43 PM    Globulin 4.1 (H) 04/06/2017 01:06 PM     Lab Results   Component Value Date/Time    Iron 19 (L) 06/26/2018 03:11 PM    TIBC 411 06/26/2018 03:11 PM    Iron % saturation 5 (LL) 06/26/2018 03:11 PM    Ferritin 7 (L) 06/26/2018 03:11 PM       No results found for: B12LT, FOL, RBCF  No results found for: TSH, TSH2, TSH3, TSHP, TSHEXT  No results found for: HAMAT, HAAB, HABT, HAAT, HBSAG, HBSB, HBSAT, HBABN, HBCM, HBCAB, HBCAT, XBCABS, HBEAB, HBEAG, XHEPCS, [id number], [address] [facility], [facility], [address] [address], [address] [place] [address], [id number], [id number], [address] [place] [address], [id number], [facility], [id number], HCGAT      Imaging:     Radiology report(s) reviewed. Assessment & Plan:   Guadalupe Shane is a 41 y.o. female comes in for evaluation and management of iron deficiency anemia. 1. Iron deficiency anemia: Most recent labs in 8/2018 reveal improvement in Hgb to 11.3. Normal colonoscopy in 8/2018 by Dr. Emigdio Gordon. -- Obtain EGD and capsule endoscopy report from Dr. Emigdio Gordon office. -- Recheck CBC and iron profile - will call patient with results  -- Return in 3 months with repeat labs    2. Thrombocytosis: Likely reactive 2/2 iron deficiency and should resolve with iron repletion. -- Continue to monitor. 3. Multiple sclerosis: On Tecfidera, followed by Dr. Deandra Beal. I appreciate the opportunity to participate in [address] [facility].     Signed By: Isauro Mead MD     October 30, 2018

## 2018-10-31 LAB
BASOPHILS # BLD AUTO: 0.1 X10E3/UL (ref 0–0.2)
BASOPHILS NFR BLD AUTO: 1 %
EOSINOPHIL # BLD AUTO: 0.1 X10E3/UL (ref 0–0.4)
EOSINOPHIL NFR BLD AUTO: 1 %
ERYTHROCYTE [DISTWIDTH] IN BLOOD BY AUTOMATED COUNT: 16.6 % (ref 12.3–15.4)
FERRITIN SERPL-MCNC: 34 NG/ML (ref 15–150)
HCT VFR BLD AUTO: 37.9 % (ref 34–46.6)
HGB BLD-MCNC: 12.6 G/DL (ref 11.1–15.9)
IMM GRANULOCYTES # BLD: 0 X10E3/UL (ref 0–0.1)
IMM GRANULOCYTES NFR BLD: 0 %
IRON SATN MFR SERPL: 21 % (ref 15–55)
IRON SERPL-MCNC: 83 UG/DL (ref 27–159)
LYMPHOCYTES # BLD AUTO: 2 X10E3/UL (ref 0.7–3.1)
LYMPHOCYTES NFR BLD AUTO: 35 %
MCH RBC QN AUTO: 26 PG (ref 26.6–33)
MCHC RBC AUTO-ENTMCNC: 33.2 G/DL (ref 31.5–35.7)
MCV RBC AUTO: 78 FL (ref 79–97)
MONOCYTES # BLD AUTO: 0.3 X10E3/UL (ref 0.1–0.9)
MONOCYTES NFR BLD AUTO: 5 %
NEUTROPHILS # BLD AUTO: 3.4 X10E3/UL (ref 1.4–7)
NEUTROPHILS NFR BLD AUTO: 58 %
PLATELET # BLD AUTO: 429 X10E3/UL (ref 150–379)
RBC # BLD AUTO: 4.84 X10E6/UL (ref 3.77–5.28)
TIBC SERPL-MCNC: 399 UG/DL (ref 250–450)
UIBC SERPL-MCNC: 316 UG/DL (ref 131–425)
WBC # BLD AUTO: 5.8 X10E3/UL (ref 3.4–10.8)

## 2018-10-31 NOTE — PROGRESS NOTES
Your labs look good with normal Hgb and normal improving iron levels. Keep taking the oral iron pills. You do not need the IV iron infusions. I will see you again in 3 months with repeat labs.

## 2018-11-01 ENCOUNTER — TELEPHONE (OUTPATIENT)
Dept: ONCOLOGY | Age: 38
End: 2018-11-01

## 2018-11-01 NOTE — PROGRESS NOTES
Patient returned call. Informed her, per Dr. Juan Cash, that her labs look good with normal hemoglobin and normal, improving iron levels. Advised that patient continue taking the oral iron pills and that she does not need IV iron infusions at this time. Also advised that Dr. Juan Cash will plan to see patient again in 3 months with repeat labs. Patient verbalized understanding. No additional questions or concerns at this time.

## 2018-11-01 NOTE — PROGRESS NOTES
Left message via cell number listed in chart requesting that patient call me back regarding her lab results. Provided office phone number as well.

## 2018-11-01 NOTE — TELEPHONE ENCOUNTER
[address] at [place]  [phone]      11/01/18 1:02 PM Returned patient's phone call, see result note.

## 2018-11-01 NOTE — TELEPHONE ENCOUNTER
Please call patient and let her know her iron stores are quite low. I would like for her to take ferrous sulfate, 325 mg a day and have her labs repeated this week. Does she have an appointment with Gastroenterology yet?
3

## 2019-01-30 ENCOUNTER — TELEPHONE (OUTPATIENT)
Dept: ONCOLOGY | Age: 39
End: 2019-01-30

## 2019-01-30 DIAGNOSIS — D50.0 IRON DEFICIENCY ANEMIA DUE TO CHRONIC BLOOD LOSS: ICD-10-CM

## 2019-01-30 NOTE — TELEPHONE ENCOUNTER
Called pt and left a voicemail requesting a return call to reschedule pts appointment per pts request on the automated system.

## 2019-03-14 ENCOUNTER — OFFICE VISIT (OUTPATIENT)
Dept: NEUROLOGY | Age: 39
End: 2019-03-14

## 2019-03-14 VITALS
OXYGEN SATURATION: 99 % | SYSTOLIC BLOOD PRESSURE: 116 MMHG | BODY MASS INDEX: 41.59 KG/M2 | TEMPERATURE: 98.8 F | WEIGHT: 265 LBS | RESPIRATION RATE: 16 BRPM | HEIGHT: 67 IN | HEART RATE: 100 BPM | DIASTOLIC BLOOD PRESSURE: 86 MMHG

## 2019-03-14 DIAGNOSIS — R56.9 SEIZURE (HCC): ICD-10-CM

## 2019-03-14 DIAGNOSIS — G35 MS (MULTIPLE SCLEROSIS) (HCC): Primary | ICD-10-CM

## 2019-03-14 DIAGNOSIS — R26.9 GAIT ABNORMALITY: ICD-10-CM

## 2019-03-14 NOTE — PROGRESS NOTES
[facility] Neurology Clinics and [address] at [facility] Neurology Clinics at [address] [place] [address], [address] [facility] [address] [facility], [address] [place] [address]  [phone] Office  [phone] Facsimile           Referring: Nicolle Ayala MD      Chief Complaint   Patient presents with    New Patient    Multiple Sclerosis     used to see Dr. Tavon Chávez but he does not take medicaid and pt had to change. dx'd since 1998. Pt states that some days she has walking difficulties     36-year-old right-handed woman who presents today for evaluation of what she calls multiple sclerosis. She tells me she was just at the age of 13years of age and notes that what led to the diagnosis that she had the sensation of her feet going to sleep and she had difficulty walking. She had multiple MRIs had spinal tap and was diagnosed and hospitalized. She was put on Avonex for a number of years and then changed to Rebif. She was noncompliant with the Rebif. She then was placed on Tecfidera which she is been on for several years now. She cannot tell me when her last relapse was. She indicates her last MRI scan was last year. She is been told that she has lesions in her brain and her spinal cord. She has been told that she has no new lesions. She has been followed by Dr. Tavon Chávez. She does not know her JOHANN virus status. She is unsure as to what her lymphocyte count has been and she is not sure as to whether her lymphocyte count has been followed. In terms of progression of disease she tells me that she now is using a cane. She says that she has difficulty using stairs or climbing stairs now and that is been over the last year. She typically uses a cane every other day or so and that has been new over the last year. She is never been introduced and    .   Interestingly she did have a seizure in 2008 and was on Keppra for a while and she was told it was secondary to an inflammatory spot in her brain or an area of brain swelling but she is unsure of details regarding that. She is no longer on Keppra and she is unsure as to why the Keppra was discontinued. She is not the greatest historian. She has requested records be sent but they are not available. Fever or chills. She does endorse anxiety depression as well as constipation and fatigue. She denies any difficulty with swallowing. No snoring. No shortness of breath. Does not endorse any changes in her appetite or weight. No visual changes. Past Medical History:   Diagnosis Date    Abnormal pap 3/2015; 4/2016; 5/16/17 2015 LGSIL +HPV; 2013 neg pap +HPV;4/2016 neg pap +HPV; 5/16/17 Neg pap +HPV    Anemia     Depression     Eczema     H/O colposcopy with cervical biopsy 3/15 + 10/2013    neg    History of seizure 4/15/2016    One in 2008    Hypercholesterolemia 5/22/2018    Morbid obesity ([address]     Multiple sclerosis ([address]     Psychiatric disorder     depression    Seizures (HCC)        Past Surgical History:   Procedure Laterality Date    HX BUNIONECTOMY      HX CHOLECYSTECTOMY      Robotic-assisted laparoscopic cholecystectomy with firefly.  HX COLPOSCOPY  6/2016; 6/8/17    neg    HX GYN      HX LEEP PROCEDURE  6/08    neg    HX ORTHOPAEDIC Left 2012    Left bunionectomy.  HX ORTHOPAEDIC Right     Right bunionectomy. Current Outpatient Medications   Medication Sig Dispense Refill    ferrous sulfate 325 mg (65 mg iron) tablet Take 1 Tab by mouth Daily (before breakfast).  norethindrone-ethinyl estradiol (CYCLAFEM 1/35, 28,) 1-35 mg-mcg tab Take 1 Tab by mouth daily. 84 Tab 4    benztropine (COGENTIN) 0.5 mg tablet TK 1 T PO BID  1    ARIPiprazole (ABILIFY) 10 mg tablet TK 1 T PO D  1    vortioxetine (TRINTELLIX) 20 mg tablet Take  by mouth daily.  dimethyl fumarate (TECFIDERA) 240 mg cpDR Take  by mouth two (2) times a day.           Allergies   Allergen Reactions    Amoxicillin Hives    Penicillins Hives       Social History     Tobacco Use    Smoking status: Never Smoker    Smokeless tobacco: Never Used   Substance Use Topics    Alcohol use: Yes     Comment: occasionally    Drug use: No       Family History   Problem Relation Age of Onset    Diabetes Mother     Hypertension Mother     MS Mother     Cancer Mother         breast    Bipolar Disorder Father     No Known Problems Sister     Breast Cancer Maternal Grandmother        Review of Systems  Pertinent positives and negatives as noted with remainder of comprehensive review negative    Examination  Visit Vitals  /86 (BP 1 Location: Left arm, BP Patient Position: Sitting)   Pulse 100   Temp 98.8 °F (37.1 °C) (Oral)   Resp 16   Ht 5' 7\" (1.702 m)   Wt 120.2 kg (265 lb)   SpO2 99%   BMI 41.50 kg/m²     She is a pleasant overweight woman. She has appropriate dress and grooming. Affect is appropriate. No scleral icterus is present. Her neck is supple and I do not appreciate a bruit. Her heart is regular and her pulses are symmetrical throughout. She has no edema of the lower extremities. Neurologically she is awake alert oriented and conversant. Her speech and language are normal.  She is able to discuss her medical history although she is a poor historian. She is fluent. She has no dysarthria. She follows all commands. She has reactive pupils bilaterally with no afferent pupillary defect. Disc margins are not well seen. She gets through full versions. No nystagmus. No pronation and no drift. She resists fully in the upper extremities bilaterally in all muscle groups to direct testing. Right lower extremity strength is full in all muscle groups. In the left lower extremity she is 4/5 throughout in all groups. Reflexes are markedly more brisk in the left upper and lower extremity versus the right. Left toe is up right toe is down. Left sided Clarissa no right sided Clarissa. She ambulates with a left-sided hemiparetic gait. 25 Foot Timed Walk 15 seconds. Sensory intact to primary. No ataxia. Impression/Plan  Long-standing multiple sclerosis and a 55-year-old woman with marked gait abnormality and left-sided weakness is noted and with an unknown JOHANN virus status unknown lymphocyte status on Tecfidera with known risk of PML and unknown MRI status at this point although she recounts being told her MRIs were stable. At this juncture we are going to send for her most recent records including her imaging. I am going to defer getting new images right now until I see how recent her most recent images were but I think we may need to end up restaging her including brain cervical and thoracic spine if those have not been done within the last year. Certainly if she has brainstem and cord lesions we may need to be more aggressive than Tecfidera therapeutically with what seems to be increasing disability in terms of her gait. Review of the electronic medical record does find a scanned MRI report from 2017 noting stable burden of disease but this is only the brain. But she had that seizure back in 2008 which is an absolute contraindication to Ampyra use and I am intrigued by why she had that seizure and why she is no longer on antiepileptic drug. The records are up clear that as well. I will defer an EEG for right now. We will send for records. Continue Tecfidera for now. Will check JOHANN virus status. Check a CBC including lymphocytes. We will send her to physical therapy. Follow-up in about 3 months    Rebeka Key MD    This note was created using voice recognition software. Despite editing, there may be syntax errors. This note will not be viewable in [address]

## 2019-03-15 LAB
ALBUMIN SERPL-MCNC: 4.7 G/DL (ref 3.5–5.5)
ALBUMIN/GLOB SERPL: 1.5 {RATIO} (ref 1.2–2.2)
ALP SERPL-CCNC: 83 IU/L (ref 39–117)
ALT SERPL-CCNC: 46 IU/L (ref 0–32)
AST SERPL-CCNC: 36 IU/L (ref 0–40)
BASOPHILS # BLD AUTO: 0.1 X10E3/UL (ref 0–0.2)
BASOPHILS NFR BLD AUTO: 1 %
BILIRUB SERPL-MCNC: 0.2 MG/DL (ref 0–1.2)
BUN SERPL-MCNC: 8 MG/DL (ref 6–20)
BUN/CREAT SERPL: 14 (ref 9–23)
CALCIUM SERPL-MCNC: 9.4 MG/DL (ref 8.7–10.2)
CHLORIDE SERPL-SCNC: 103 MMOL/L (ref 96–106)
CO2 SERPL-SCNC: 22 MMOL/L (ref 20–29)
CREAT SERPL-MCNC: 0.56 MG/DL (ref 0.57–1)
EOSINOPHIL # BLD AUTO: 0.1 X10E3/UL (ref 0–0.4)
EOSINOPHIL NFR BLD AUTO: 2 %
ERYTHROCYTE [DISTWIDTH] IN BLOOD BY AUTOMATED COUNT: 14 % (ref 12.3–15.4)
GLOBULIN SER CALC-MCNC: 3.1 G/DL (ref 1.5–4.5)
GLUCOSE SERPL-MCNC: 92 MG/DL (ref 65–99)
HCT VFR BLD AUTO: 36.7 % (ref 34–46.6)
HGB BLD-MCNC: 11.7 G/DL (ref 11.1–15.9)
IMM GRANULOCYTES # BLD AUTO: 0 X10E3/UL (ref 0–0.1)
IMM GRANULOCYTES NFR BLD AUTO: 0 %
LYMPHOCYTES # BLD AUTO: 2.6 X10E3/UL (ref 0.7–3.1)
LYMPHOCYTES NFR BLD AUTO: 38 %
MCH RBC QN AUTO: 26.5 PG (ref 26.6–33)
MCHC RBC AUTO-ENTMCNC: 31.9 G/DL (ref 31.5–35.7)
MCV RBC AUTO: 83 FL (ref 79–97)
MONOCYTES # BLD AUTO: 0.4 X10E3/UL (ref 0.1–0.9)
MONOCYTES NFR BLD AUTO: 7 %
NEUTROPHILS # BLD AUTO: 3.6 X10E3/UL (ref 1.4–7)
NEUTROPHILS NFR BLD AUTO: 52 %
PLATELET # BLD AUTO: 432 X10E3/UL (ref 150–379)
POTASSIUM SERPL-SCNC: 4.4 MMOL/L (ref 3.5–5.2)
PROT SERPL-MCNC: 7.8 G/DL (ref 6–8.5)
RBC # BLD AUTO: 4.41 X10E6/UL (ref 3.77–5.28)
SODIUM SERPL-SCNC: 139 MMOL/L (ref 134–144)
WBC # BLD AUTO: 6.8 X10E3/UL (ref 3.4–10.8)

## 2019-04-02 ENCOUNTER — HOSPITAL ENCOUNTER (OUTPATIENT)
Dept: PHYSICAL THERAPY | Age: 39
Discharge: HOME | End: 2019-04-02
Payer: COMMERCIAL

## 2019-04-02 PROCEDURE — 97162 PT EVAL MOD COMPLEX 30 MIN: CPT | Performed by: PHYSICAL THERAPIST

## 2019-04-02 PROCEDURE — 97110 THERAPEUTIC EXERCISES: CPT | Performed by: PHYSICAL THERAPIST

## 2019-04-02 PROCEDURE — 97112 NEUROMUSCULAR REEDUCATION: CPT | Performed by: PHYSICAL THERAPIST

## 2019-04-02 NOTE — PROGRESS NOTES
PT INITIAL EVALUATION NOTE 2-15    Patient Name: Alexandra Navarro  Date:2019  : 1980  [x]  Patient  Verified  Payor: [place] / Plan:     / Product Type: Medicaid /    In time:3:00 PM  Out time:4:00 PM  Total Treatment Time (min): 60  Visit #: 1     Treatment Area: Gait instability [R26.81]  Multiple sclerosis [G35]    SUBJECTIVE  Pain Level (0-10 scale): 0  Any medication changes, allergies to medications, adverse drug reactions, diagnosis change, or new procedure performed?: [] No    [x] Yes (see summary sheet for update)  Subjective:     MS, gait instability  PLOF: Significantly limited by her PMH, but able to ambulate for further distances without AD  Mechanism of Injury: Patient reports a gradual decline in physical capacity over the past 6 months. She notices greater difficulty ambulating stairs and requires a SPC when ambulating in the community. Previous Treatment/Compliance: She was evaluated by Dr. Geovany Leonard, and he referred her to this clinic to work on her balance. PMHx/Surgical Hx: She has suffered from [address] since , but has had not had a relapse \"in a long time\". She has had not had any recent falls. Her primary impairment from MS is L hemiparesis. She also is obese and suffers from depression.   Work Hx:  for [address] [place] [address] Situation: lives in a one story home alone  Pt Goals: to improve gait stability  Barriers: PMH  Motivation: very motivated  Substance use: none   FABQ Score: n/a  Cognition: A & O x 4        OBJECTIVE/EXAMINATION     Strength:    LOWER QUARTER   MUSCLE STRENGTH  KEY       R  L  0 - No Contraction  L1, L2 Psoas  3+/5  2/5    1 - Trace   L3 Quads  4/5  3/5    2 - Poor   L4 Tib Ant  4/5  3+/5    3 - Fair    L5 EHL  3+/5  3/5    4 - Good   S1 FHL  4/5  3+/5    5 - Normal   S2 Hams  3+/5  2/5          Sensation: Intact    Vision: Intact  Balance/ Equilibrium:        Functional Gait Assessment (30pt scale):13 [place] Balance Scale (56pt scale): 48    Functional Mobility      Bed Mobility: Independent           Transfers:       Sit-Stand: Mod I      Gait: With SPC      Stairs: Two feet to a stair, two handrails    25 min Therapeutic Exercise:  [x] See flow sheet :   Rationale: increase ROM, increase strength and improve coordination to improve the patients ability to walk without LOB     15 min Neuromuscular Re-education:  [x]  See flow sheet :   Rationale: improve coordination, improve balance and increase proprioception  to improve the patients ability to walk without LOB          With   [] TE   [] TA   [] neuro   [] other: Patient Education: [x] Review HEP    [] Progressed/Changed HEP based on:   [] positioning   [] body mechanics   [] transfers   [] heat/ice application    [] other:        Other Objective/Functional Measures:    Pain Level (0-10 scale) post treatment: 0      ASSESSMENT:      [x]  See Plan of Care      Gilford Rayas, PT , DPT, OCS, Cert.  DN   4/2/2019

## 2019-04-03 LAB
INDEX VALUE, 86009788: 2.46
JCV AB: POSITIVE

## 2019-04-03 NOTE — PROGRESS NOTES
[address]  Phone: [phone]  Fax: [phone]    Plan of Care/ Statement of Necessity for Physical Therapy Services 2-15    Patient name: Justina Felder  : 1980  Provider#: [id number]  Referral source: Joey Moore MD      Medical/Treatment Diagnosis: Gait instability [R26.81]  Multiple sclerosis [G35]     Prior Hospitalization: see medical history     Comorbidities: Obesity, MS  Prior Level of Function: see initial eval  Medications: Verified on Patient Summary List    Start of Care: 20      Onset Date: Gradual declined over the past 6 months       The Plan of Care and following information is based on the information from the initial evaluation. Assessment/ key information: Patient presents with gait instability secondary to worsening MS symptoms. Today she presented with a significant fall risk and requires an AD to ambulate within the community. She should progress well with a gradual progression of neuromuscular re-education and strengthening exercises to improve fall risk in the community. Evaluation Complexity History MEDIUM  Complexity : 1-2 comorbidities / personal factors will impact the outcome/ POC ; Examination MEDIUM Complexity : 3 Standardized tests and measures addressing body structure, function, activity limitation and / or participation in recreation  ;Presentation MEDIUM Complexity : Evolving with changing characteristics  ; Clinical Decision Making MEDIUM Complexity : FOTO score of 26-74  Overall Complexity Rating: MEDIUM    Problem List: pain affecting function, decrease ROM, decrease strength, impaired gait/ balance, decrease ADL/ functional abilitiies, decrease activity tolerance, decrease flexibility/ joint mobility and decrease transfer abilities   Treatment Plan may include any combination of the following: Therapeutic exercise, Therapeutic activities, Neuromuscular re-education, Physical agent/modality, Gait/balance training, Manual therapy, Patient education, Self Care training, Functional mobility training, Home safety training and Stair training  Patient / Family readiness to learn indicated by: asking questions, trying to perform skills and interest  Persons(s) to be included in education: patient (P)  Barriers to Learning/Limitations: None  Patient Goal (s): I want to be able to walk without fearing I will fall.   Patient Self Reported Health Status: fair  Rehabilitation Potential: fair    Short Term Goals: To be accomplished in 8 treatments:  1. Patient will be to demonstrate SLS >5 seconds on both LE to allow for decreased fall risk. 2. Patient will be able to ambulate for 50 ft. With no AD and no LOB. 3. Patient will be able to demonstrate tandem stance with EC for >5 seconds to allow for improved ability to ambulate in the dark. Long Term Goals: To be accomplished in 16 treatments:  1. Patient will be able to demonstrate SLS >15 seconds on both LE to allow for decreased fall risk. 2. Patient will be able to ambulate a flight of stairs with no handrail and report no LOB. 3. Patient will be able to walk 1/2 mile without requiring a rest break. Frequency / Duration: Patient to be seen 2 times per week for 8 weeks. Patient/ Caregiver education and instruction: self care, activity modification and exercises    [x]  Plan of care has been reviewed with NETTIE Loera Nurse, PT 4/3/2019     ________________________________________________________________________    I certify that the above Therapy Services are being furnished while the patient is under my care. I agree with the treatment plan and certify that this therapy is necessary.     [de-identified] Signature:____________________  Date:____________Time: _________

## 2019-04-04 ENCOUNTER — HOSPITAL ENCOUNTER (OUTPATIENT)
Dept: PHYSICAL THERAPY | Age: 39
Discharge: HOME | End: 2019-04-04
Payer: COMMERCIAL

## 2019-04-04 PROCEDURE — 97112 NEUROMUSCULAR REEDUCATION: CPT

## 2019-04-04 PROCEDURE — 97110 THERAPEUTIC EXERCISES: CPT

## 2019-04-04 NOTE — PROGRESS NOTES
PT DAILY TREATMENT NOTE 2-15    Patient Name: Nathan Hsu  Date:2019  : 1980  [x]  Patient  Verified  Payor: Santi 22 / [place]: [place] [address] / Product Type: Managed Care Medicaid /    In time:3:00p  Out time:4:00p  Total Treatment Time (min): 60  Visit #: 2    Treatment Area: Gait instability [R26.81]  Multiple sclerosis [G35]    SUBJECTIVE  Pain Level (0-10 scale): 0  Any medication changes, allergies to medications, adverse drug reactions, diagnosis change, or new procedure performed?: [x] No    [] Yes (see summary sheet for update)  Subjective functional status/changes:   [] No changes reported  Patient reports some back pain earlier due to standing for a long period, but felt better after sitting for a few minutes. Patient repors compliance with HEP. OBJECTIVE        30 min Therapeutic Exercise:  [x] See flow sheet :   Rationale: increase ROM and increase strength to improve the patients ability to sit, stand, lift, carry, reach, ambulate and complete ADL's    30 min Neuromuscular Re-education:  []  See flow sheet :   Rationale: increase ROM, increase strength, improve coordination, improve balance and increase proprioception  to improve the patients ability to  sit, stand, lift, carry, reach, ambulate and complete ADL's       With   [] TE   [] TA   [] neuro   [] other: Patient Education: [x] Review HEP    [] Progressed/Changed HEP based on:   [] positioning   [] body mechanics   [] transfers   [] heat/ice application    [] other:      Other Objective/Functional Measures:  Patient required several seated rest breaks due to increased fatigue and min back pain with standing for long periods.      Pain Level (0-10 scale) post treatment: 0    ASSESSMENT/Changes in Function:     Patient will continue to benefit from skilled PT services to modify and progress therapeutic interventions, address functional mobility deficits, address ROM deficits, address strength deficits, analyze and address soft tissue restrictions, analyze and cue movement patterns, analyze and modify body mechanics/ergonomics, assess and modify postural abnormalities and address imbalance/dizziness to attain remaining goals. []  See Plan of Care  []  See progress note/recertification  []  See Discharge Summary         Progress towards goals / Updated goals:  Patient able to advance several exercises with verbal cues for proper reproduction. Patient demonstrates increased fatigue with standing exercises.     PLAN  [x]  Upgrade activities as tolerated     [x]  Continue plan of care  []  Update interventions per flow sheet       []  Discharge due to:_  []  Other:_      Aramis Quigley 4/4/2019

## 2019-04-09 ENCOUNTER — HOSPITAL ENCOUNTER (OUTPATIENT)
Dept: PHYSICAL THERAPY | Age: 39
Discharge: HOME | End: 2019-04-09
Payer: COMMERCIAL

## 2019-04-09 PROCEDURE — 97110 THERAPEUTIC EXERCISES: CPT | Performed by: PHYSICAL THERAPIST

## 2019-04-09 PROCEDURE — 97112 NEUROMUSCULAR REEDUCATION: CPT | Performed by: PHYSICAL THERAPIST

## 2019-04-10 NOTE — PROGRESS NOTES
PT DAILY TREATMENT NOTE 2-15    Patient Name: Alexandra Navarro  Date:4/10/2019  : 1980  [x]  Patient  Verified  Payor: Santi 22 / Plan: [address] / Product Type: Managed Care Medicaid /    In time:3:00p  Out time:4:00p  Total Treatment Time (min): 60  Visit #: 3    Treatment Area: Gait instability [R26.81]  Multiple sclerosis [G35]    SUBJECTIVE  Pain Level (0-10 scale): 0  Any medication changes, allergies to medications, adverse drug reactions, diagnosis change, or new procedure performed?: [x] No    [] Yes (see summary sheet for update)  Subjective functional status/changes:   [] No changes reported  Patient reports that the increase in temperature is causing increased fatigue, but otherwise she is doing well. OBJECTIVE        30 min Therapeutic Exercise:  [x] See flow sheet :   Rationale: increase ROM and increase strength to improve the patients ability to sit, stand, lift, carry, reach, ambulate and complete ADL's    30 min Neuromuscular Re-education:  []  See flow sheet :   Rationale: increase ROM, increase strength, improve coordination, improve balance and increase proprioception  to improve the patients ability to  sit, stand, lift, carry, reach, ambulate and complete ADL's       With   [] TE   [] TA   [] neuro   [] other: Patient Education: [x] Review HEP    [] Progressed/Changed HEP based on:   [] positioning   [] body mechanics   [] transfers   [] heat/ice application    [] other:      Other Objective/Functional Measures:  Patient continues to require several seated rest breaks due to increased fatigue and min back pain with standing for long periods.       Pain Level (0-10 scale) post treatment: 0    ASSESSMENT/Changes in Function:     Patient will continue to benefit from skilled PT services to modify and progress therapeutic interventions, address functional mobility deficits, address ROM deficits, address strength deficits, analyze and address soft tissue restrictions, analyze and cue movement patterns, analyze and modify body mechanics/ergonomics, assess and modify postural abnormalities and address imbalance/dizziness to attain remaining goals. []  See Plan of Care  []  See progress note/recertification  []  See Discharge Summary         Progress towards goals / Updated goals:  Patient is tolerating all interventions well, but no progress was seen today due to increased fatigue. PLAN  [x]  Upgrade activities as tolerated     [x]  Continue plan of care  []  Update interventions per flow sheet       []  Discharge due to:_  []  Other:_      Jefry Okeefe PT , DPT, OCS, Cert.  DN   4/10/2019

## 2019-04-11 ENCOUNTER — HOSPITAL ENCOUNTER (OUTPATIENT)
Dept: PHYSICAL THERAPY | Age: 39
Discharge: HOME | End: 2019-04-11
Payer: COMMERCIAL

## 2019-04-11 PROCEDURE — 97112 NEUROMUSCULAR REEDUCATION: CPT | Performed by: PHYSICAL MEDICINE & REHABILITATION

## 2019-04-11 PROCEDURE — 97110 THERAPEUTIC EXERCISES: CPT | Performed by: PHYSICAL MEDICINE & REHABILITATION

## 2019-04-11 NOTE — PROGRESS NOTES
PT DAILY TREATMENT NOTE 2-15    Patient Name: Shelly Zelaya  Date:2019  : 1980  [x]  Patient  Verified  Payor: Santi 22 / Plan: [address] / Product Type: Managed Care Medicaid /    In time:255p  Out time:240p  Total Treatment Time (min): 45  Visit #: 3    Treatment Area: Gait instability [R26.81]  Multiple sclerosis [G35]    SUBJECTIVE  Pain Level (0-10 scale): 0  Any medication changes, allergies to medications, adverse drug reactions, diagnosis change, or new procedure performed?: [x] No    [] Yes (see summary sheet for update)  Subjective functional status/changes:   [] No changes reported  Patient reports that that she is just a little more fatigued today. OBJECTIVE  30 min Therapeutic Exercise:  [x] See flow sheet :   Rationale: increase ROM and increase strength to improve the patients ability to sit, stand, lift, carry, reach, ambulate and complete ADL's    15 min Neuromuscular Re-education:  [x]  See flow sheet :   Rationale: increase ROM, increase strength, improve coordination, improve balance and increase proprioception  to improve the patients ability to  sit, stand, lift, carry, reach, ambulate and complete ADL's       With   [] TE   [] TA   [] neuro   [] other: Patient Education: [x] Review HEP    [] Progressed/Changed HEP based on:   [] positioning   [] body mechanics   [] transfers   [] heat/ice application    [] other:      Other Objective/Functional Measures: Mod fatigue present today with multiple rest breaks need.       Pain Level (0-10 scale) post treatment: 0    ASSESSMENT/Changes in Function:     Patient will continue to benefit from skilled PT services to modify and progress therapeutic interventions, address functional mobility deficits, address ROM deficits, address strength deficits, analyze and address soft tissue restrictions, analyze and cue movement patterns, analyze and modify body mechanics/ergonomics, assess and modify postural abnormalities and address imbalance/dizziness to attain remaining goals. []  See Plan of Care  []  See progress note/recertification  []  See Discharge Summary         Progress towards goals / Updated goals:  Patient is tolerating all interventions well, but no progress was seen today due to increased fatigue.     PLAN  [x]  Upgrade activities as tolerated     [x]  Continue plan of care  []  Update interventions per flow sheet       []  Discharge due to:_  []  Other:_      Sherlene Boas , PTA, CPT 4/11/2019

## 2019-04-16 ENCOUNTER — HOSPITAL ENCOUNTER (OUTPATIENT)
Dept: PHYSICAL THERAPY | Age: 39
Discharge: HOME | End: 2019-04-16
Payer: COMMERCIAL

## 2019-04-16 PROCEDURE — 97110 THERAPEUTIC EXERCISES: CPT

## 2019-04-16 PROCEDURE — 97112 NEUROMUSCULAR REEDUCATION: CPT

## 2019-04-16 NOTE — PROGRESS NOTES
PT DAILY TREATMENT NOTE 2-15    Patient Name: Emely Azul  Date:2019  : 1980  [x]  Patient  Verified  Payor: Santi 22 / Plan: [address] / Product Type: Managed Care Medicaid /    In time: 3:00p  Out time: 3:55  Total Treatment Time (min): 55  Visit #: 3    Treatment Area: Gait instability [R26.81]  Multiple sclerosis [G35]    SUBJECTIVE  Pain Level (0-10 scale): 0  Any medication changes, allergies to medications, adverse drug reactions, diagnosis change, or new procedure performed?: [x] No    [] Yes (see summary sheet for update)  Subjective functional status/changes:   [] No changes reported  Patient reports she is doing a little better than last visit with less fatigue.      OBJECTIVE  30 min Therapeutic Exercise:  [x] See flow sheet :   Rationale: increase ROM and increase strength to improve the patients ability to sit, stand, lift, carry, reach, ambulate and complete ADL's    30 min Neuromuscular Re-education:  [x]  See flow sheet :   Rationale: increase ROM, increase strength, improve coordination, improve balance and increase proprioception  to improve the patients ability to  sit, stand, lift, carry, reach, ambulate and complete ADL's       With   [] TE   [] TA   [] neuro   [] other: Patient Education: [x] Review HEP    [] Progressed/Changed HEP based on:   [] positioning   [] body mechanics   [] transfers   [] heat/ice application    [] other:      Other Objective/Functional Measures:     Pt required fewer rest breaks, increased difficulty with SLS,    Pain Level (0-10 scale) post treatment: 0    ASSESSMENT/Changes in Function:     Patient will continue to benefit from skilled PT services to modify and progress therapeutic interventions, address functional mobility deficits, address ROM deficits, address strength deficits, analyze and address soft tissue restrictions, analyze and cue movement patterns, analyze and modify body mechanics/ergonomics, assess and modify postural abnormalities and address imbalance/dizziness to attain remaining goals. []  See Plan of Care  []  See progress note/recertification  []  See Discharge Summary         Progress towards goals / Updated goals:  Patient did well with all interventions with mod fatigue noted by end of session. Patient making good progress towards goals at this time.      PLAN  [x]  Upgrade activities as tolerated     [x]  Continue plan of care  []  Update interventions per flow sheet       []  Discharge due to:_  []  Other:_      Hillary Herrera , NETTIE 4/16/2019

## 2019-04-18 ENCOUNTER — HOSPITAL ENCOUNTER (OUTPATIENT)
Dept: PHYSICAL THERAPY | Age: 39
Discharge: HOME | End: 2019-04-18
Payer: COMMERCIAL

## 2019-04-18 PROCEDURE — 97110 THERAPEUTIC EXERCISES: CPT | Performed by: PHYSICAL THERAPIST

## 2019-04-18 PROCEDURE — 97112 NEUROMUSCULAR REEDUCATION: CPT | Performed by: PHYSICAL THERAPIST

## 2019-04-18 NOTE — PROGRESS NOTES
PT DAILY TREATMENT NOTE 2-15    Patient Name: Cheyanne Owens  Date:2019  : 1980  [x]  Patient  Verified  Payor: Santi 22 / Plan: [address] / Product Type: Managed Care Medicaid /    In time: 3:00p  Out time: 3:55  Total Treatment Time (min): 55  Visit #: 6    Treatment Area: Gait instability [R26.81]  Multiple sclerosis [G35]    SUBJECTIVE  Pain Level (0-10 scale): 0  Any medication changes, allergies to medications, adverse drug reactions, diagnosis change, or new procedure performed?: [x] No    [] Yes (see summary sheet for update)  Subjective functional status/changes:   [] No changes reported  Patient reports some fatigue with today's heat, but otherwise she is feeling better than when she first started PT.    OBJECTIVE  30 min Therapeutic Exercise:  [x] See flow sheet :   Rationale: increase ROM and increase strength to improve the patients ability to sit, stand, lift, carry, reach, ambulate and complete ADL's    30 min Neuromuscular Re-education:  [x]  See flow sheet :   Rationale: increase ROM, increase strength, improve coordination, improve balance and increase proprioception  to improve the patients ability to  sit, stand, lift, carry, reach, ambulate and complete ADL's       With   [] TE   [] TA   [] neuro   [] other: Patient Education: [x] Review HEP    [] Progressed/Changed HEP based on:   [] positioning   [] body mechanics   [] transfers   [] heat/ice application    [] other:      Other Objective/Functional Measures:     Pain Level (0-10 scale) post treatment: 0    ASSESSMENT/Changes in Function:     Patient will continue to benefit from skilled PT services to modify and progress therapeutic interventions, address functional mobility deficits, address ROM deficits, address strength deficits, analyze and address soft tissue restrictions, analyze and cue movement patterns, analyze and modify body mechanics/ergonomics, assess and modify postural abnormalities and address imbalance/dizziness to attain remaining goals. []  See Plan of Care  []  See progress note/recertification  []  See Discharge Summary         Progress towards goals / Updated goals:  Short Term Goals: To be accomplished in 8 treatments:  1. Patient will be to demonstrate SLS >5 seconds on both LE to allow for decreased fall risk. Met.  2. Patient will be able to ambulate for 50 ft. With no AD and no LOB. Met.  3. Patient will be able to demonstrate tandem stance with EC for >5 seconds to allow for improved ability to ambulate in the dark. Met. PLAN  [x]  Upgrade activities as tolerated     [x]  Continue plan of care  []  Update interventions per flow sheet       []  Discharge due to:_  []  Other:_      Jefry Okeefe PT  , DPT, OCS, Cert.  DN   4/18/2019

## 2019-04-23 ENCOUNTER — HOSPITAL ENCOUNTER (OUTPATIENT)
Dept: PHYSICAL THERAPY | Age: 39
Discharge: HOME | End: 2019-04-23
Payer: COMMERCIAL

## 2019-04-23 PROCEDURE — 97112 NEUROMUSCULAR REEDUCATION: CPT

## 2019-04-23 PROCEDURE — 97110 THERAPEUTIC EXERCISES: CPT

## 2019-04-23 NOTE — PROGRESS NOTES
PT DAILY TREATMENT NOTE 2-15    Patient Name: Alai Rojas  Date:2019  : 1980  [x]  Patient  Verified  Payor: Santi 22 / Plan: [address] / Product Type: Managed Care Medicaid /    In time: 3:00p  Out time: 3:55p  Total Treatment Time (min): 55  Visit #: 7    Treatment Area: Gait instability [R26.81]  Multiple sclerosis [G35]    SUBJECTIVE  Pain Level (0-10 scale): 0  Any medication changes, allergies to medications, adverse drug reactions, diagnosis change, or new procedure performed?: [x] No    [] Yes (see summary sheet for update)  Subjective functional status/changes:   [] No changes reported  Patient reports she had been feeling good. Patient reports a little more fatigue than her usual today.     OBJECTIVE  30 min Therapeutic Exercise:  [x] See flow sheet :   Rationale: increase ROM and increase strength to improve the patients ability to sit, stand, lift, carry, reach, ambulate and complete ADL's    25 min Neuromuscular Re-education:  [x]  See flow sheet :   Rationale: increase ROM, increase strength, improve coordination, improve balance and increase proprioception  to improve the patients ability to  sit, stand, lift, carry, reach, ambulate and complete ADL's       With   [] TE   [] TA   [] neuro   [] other: Patient Education: [x] Review HEP    [] Progressed/Changed HEP based on:   [] positioning   [] body mechanics   [] transfers   [] heat/ice application    [] other:      Other Objective/Functional Measures: pt with 2-3 rest breaks this visit secondary to fatigue, unable to advance exercises due to increased fatigue     Pain Level (0-10 scale) post treatment: 0    ASSESSMENT/Changes in Function:     Patient will continue to benefit from skilled PT services to modify and progress therapeutic interventions, address functional mobility deficits, address ROM deficits, address strength deficits, analyze and address soft tissue restrictions, analyze and cue movement patterns, analyze and modify body mechanics/ergonomics, assess and modify postural abnormalities and address imbalance/dizziness to attain remaining goals. []  See Plan of Care  []  See progress note/recertification  []  See Discharge Summary         Progress towards goals / Updated goals:   Patient is making good progress towards long term goals and will do well with continued PT 2x week for 2 weeks. Short Term Goals: To be accomplished in 8 treatments:  1. Patient will be to demonstrate SLS >5 seconds on both LE to allow for decreased fall risk. Met.  2. Patient will be able to ambulate for 50 ft. With no AD and no LOB. Met.  3. Patient will be able to demonstrate tandem stance with EC for >5 seconds to allow for improved ability to ambulate in the dark. Met. Long Term Goals: To be accomplished in 16 treatments:  1. Patient will be able to demonstrate SLS >15 seconds on both LE to allow for decreased fall risk. 2. Patient will be able to ambulate a flight of stairs with no handrail and report no LOB. 3. Patient will be able to walk 1/2 mile without requiring a rest break. Frequency / Duration: Patient to be seen 2 times per week for 8 weeks.       PLAN  [x]  Upgrade activities as tolerated     [x]  Continue plan of care  []  Update interventions per flow sheet       []  Discharge due to:_  []  Other:_      Cindi Nam  PTA   4/23/2019

## 2019-04-25 ENCOUNTER — HOSPITAL ENCOUNTER (OUTPATIENT)
Dept: PHYSICAL THERAPY | Age: 39
Discharge: HOME | End: 2019-04-25
Payer: COMMERCIAL

## 2019-04-25 PROCEDURE — 97110 THERAPEUTIC EXERCISES: CPT

## 2019-04-25 PROCEDURE — 97112 NEUROMUSCULAR REEDUCATION: CPT

## 2019-04-25 NOTE — PROGRESS NOTES
PT DAILY TREATMENT NOTE 2-15    Patient Name: Omer Arenas  Date:2019  : 1980  [x]  Patient  Verified  Payor: Santi 22 / Plan: [address] / Product Type: Managed Care Medicaid /    In time: 3:00p  Out time: 3:55p  Total Treatment Time (min): 55  Visit #: 8    Treatment Area: Gait instability [R26.81]  Multiple sclerosis [G35]    SUBJECTIVE  Pain Level (0-10 scale): 0  Any medication changes, allergies to medications, adverse drug reactions, diagnosis change, or new procedure performed?: [x] No    [] Yes (see summary sheet for update)  Subjective functional status/changes:   [] No changes reported  Patient reports decreased fatigue today, but is having more difficulty with walking as her knees want to lock with every step.      OBJECTIVE    30 min Therapeutic Exercise:  [x] See flow sheet :   Rationale: increase ROM and increase strength to improve the patients ability to sit, stand, lift, carry, reach, ambulate and complete ADL's    25 min Neuromuscular Re-education:  [x]  See flow sheet :   Rationale: increase ROM, increase strength, improve coordination, improve balance and increase proprioception  to improve the patients ability to  sit, stand, lift, carry, reach, ambulate and complete ADL's       With   [] TE   [] TA   [] neuro   [] other: Patient Education: [x] Review HEP    [] Progressed/Changed HEP based on:   [] positioning   [] body mechanics   [] transfers   [] heat/ice application    [] other:      Other Objective/Functional Measures: pt with 2-3 rest breaks this visit secondary to fatigue, max fatigue with harrison walking, min a LLE to prevent hip circumduction     Pain Level (0-10 scale) post treatment: 0    ASSESSMENT/Changes in Function:     Patient will continue to benefit from skilled PT services to modify and progress therapeutic interventions, address functional mobility deficits, address ROM deficits, address strength deficits, analyze and address soft tissue restrictions, analyze and cue movement patterns, analyze and modify body mechanics/ergonomics, assess and modify postural abnormalities and address imbalance/dizziness to attain remaining goals. []  See Plan of Care  []  See progress note/recertification  []  See Discharge Summary         Progress towards goals / Updated goals:   Patient with increased fatigue after standing interventions and had increased difficulty with neuro re-education exercises. Short Term Goals: To be accomplished in 8 treatments:  1. Patient will be to demonstrate SLS >5 seconds on both LE to allow for decreased fall risk. Met.  2. Patient will be able to ambulate for 50 ft. With no AD and no LOB. Met.  3. Patient will be able to demonstrate tandem stance with EC for >5 seconds to allow for improved ability to ambulate in the dark. Met. Long Term Goals: To be accomplished in 16 treatments:  1. Patient will be able to demonstrate SLS >15 seconds on both LE to allow for decreased fall risk. 2. Patient will be able to ambulate a flight of stairs with no handrail and report no LOB. 3. Patient will be able to walk 1/2 mile without requiring a rest break. Frequency / Duration: Patient to be seen 2 times per week for 8 weeks.       PLAN  [x]  Upgrade activities as tolerated     [x]  Continue plan of care  []  Update interventions per flow sheet       []  Discharge due to:_  []  Other:_      Qian Hubbard  PTA   4/25/2019

## 2019-04-30 ENCOUNTER — HOSPITAL ENCOUNTER (OUTPATIENT)
Dept: PHYSICAL THERAPY | Age: 39
Discharge: HOME | End: 2019-04-30
Payer: COMMERCIAL

## 2019-04-30 PROCEDURE — 97110 THERAPEUTIC EXERCISES: CPT

## 2019-04-30 PROCEDURE — 97112 NEUROMUSCULAR REEDUCATION: CPT

## 2019-04-30 NOTE — PROGRESS NOTES
PT DAILY TREATMENT NOTE 2-15    Patient Name: Laron Karimi  Date:2019  : 1980  [x]  Patient  Verified  Payor: [employer] / Plan: [address] / Product Type: Managed Care Medicaid /    In time: 3:00p  Out time: 3:55p  Total Treatment Time (min): 55  Visit #: 9    Treatment Area: Gait instability [R26.81]  Multiple sclerosis [G35]    SUBJECTIVE  Pain Level (0-10 scale): 0  Any medication changes, allergies to medications, adverse drug reactions, diagnosis change, or new procedure performed?: [x] No    [] Yes (see summary sheet for update)  Subjective functional status/changes:   [] No changes reported  Patient reports decreased difficulty with walking today.     OBJECTIVE    30 min Therapeutic Exercise:  [x] See flow sheet :   Rationale: increase ROM and increase strength to improve the patients ability to sit, stand, lift, carry, reach, ambulate and complete ADL's    25 min Neuromuscular Re-education:  [x]  See flow sheet :   Rationale: increase ROM, increase strength, improve coordination, improve balance and increase proprioception  to improve the patients ability to  sit, stand, lift, carry, reach, ambulate and complete ADL's       With   [] TE   [] TA   [] neuro   [] other: Patient Education: [x] Review HEP    [] Progressed/Changed HEP based on:   [] positioning   [] body mechanics   [] transfers   [] heat/ice application    [] other:      Other Objective/Functional Measures: pt with 1-2 rest breaks this visit secondary to fatigue, max fatigue with harrison walking, no assist required for LLE with hurdles today    Pain Level (0-10 scale) post treatment: 0    ASSESSMENT/Changes in Function:     Patient will continue to benefit from skilled PT services to modify and progress therapeutic interventions, address functional mobility deficits, address ROM deficits, address strength deficits, analyze and address soft tissue restrictions, analyze and cue movement patterns, analyze and modify body mechanics/ergonomics, assess and modify postural abnormalities and address imbalance/dizziness to attain remaining goals. []  See Plan of Care  []  See progress note/recertification  []  See Discharge Summary         Progress towards goals / Updated goals:   Patient with improved tolerance with decreased rest breaks this visit. Short Term Goals: To be accomplished in 8 treatments:  1. Patient will be to demonstrate SLS >5 seconds on both LE to allow for decreased fall risk. Met.  2. Patient will be able to ambulate for 50 ft. With no AD and no LOB. Met.  3. Patient will be able to demonstrate tandem stance with EC for >5 seconds to allow for improved ability to ambulate in the dark. Met. Long Term Goals: To be accomplished in 16 treatments:  1. Patient will be able to demonstrate SLS >15 seconds on both LE to allow for decreased fall risk. 2. Patient will be able to ambulate a flight of stairs with no handrail and report no LOB. 3. Patient will be able to walk 1/2 mile without requiring a rest break. Frequency / Duration: Patient to be seen 2 times per week for 8 weeks.       PLAN  [x]  Upgrade activities as tolerated     [x]  Continue plan of care  []  Update interventions per flow sheet       []  Discharge due to:_  []  Other:_      Jeni Cost  PTA   4/30/2019

## 2019-05-07 ENCOUNTER — HOSPITAL ENCOUNTER (OUTPATIENT)
Dept: PHYSICAL THERAPY | Age: 39
Discharge: HOME | End: 2019-05-07
Payer: COMMERCIAL

## 2019-05-07 PROCEDURE — 97110 THERAPEUTIC EXERCISES: CPT

## 2019-05-07 PROCEDURE — 97112 NEUROMUSCULAR REEDUCATION: CPT

## 2019-05-07 NOTE — PROGRESS NOTES
PT DAILY TREATMENT NOTE 2-15    Patient Name: Nathan Hsu  Date:2019  : 1980  [x]  Patient  Verified  Payor: Santi 22 / [place] [address] / Product Type: Managed Care Medicaid /    In time: 2:40p  Out time: 3:20p  Total Treatment Time (min): 40  Visit #: 11    Treatment Area: Gait instability [R26.81]  Multiple sclerosis [G35]    SUBJECTIVE  Pain Level (0-10 scale): 0  Any medication changes, allergies to medications, adverse drug reactions, diagnosis change, or new procedure performed?: [x] No    [] Yes (see summary sheet for update)  Subjective functional status/changes:   [x] No changes reported      OBJECTIVE    30 min Therapeutic Exercise:  [x] See flow sheet :   Rationale: increase ROM and increase strength to improve the patients ability to sit, stand, lift, carry, reach, ambulate and complete ADL's    25 min Neuromuscular Re-education:  [x]  See flow sheet :   Rationale: increase ROM, increase strength, improve coordination, improve balance and increase proprioception  to improve the patients ability to  sit, stand, lift, carry, reach, ambulate and complete ADL's       With   [] TE   [] TA   [] neuro   [] other: Patient Education: [x] Review HEP    [] Progressed/Changed HEP based on:   [] positioning   [] body mechanics   [] transfers   [] heat/ice application    [] other:      Other Objective/Functional Measures:     Pain Level (0-10 scale) post treatment: 0    ASSESSMENT/Changes in Function:     Patient will continue to benefit from skilled PT services to modify and progress therapeutic interventions, address functional mobility deficits, address ROM deficits, address strength deficits, analyze and address soft tissue restrictions, analyze and cue movement patterns, analyze and modify body mechanics/ergonomics, assess and modify postural abnormalities and address imbalance/dizziness to attain remaining goals.      []  See Plan of Care  []  See progress note/recertification  []  See Discharge Summary         Progress towards goals / Updated goals: Patient doing well with improved exercise tolerance and overall endurance and should be ready for discharge next visit. Short Term Goals: To be accomplished in 8 treatments:  1. Patient will be to demonstrate SLS >5 seconds on both LE to allow for decreased fall risk. Met.  2. Patient will be able to ambulate for 50 ft. With no AD and no LOB. Met.  3. Patient will be able to demonstrate tandem stance with EC for >5 seconds to allow for improved ability to ambulate in the dark. Met. Long Term Goals: To be accomplished in 16 treatments:  1. Patient will be able to demonstrate SLS >15 seconds on both LE to allow for decreased fall risk. 2. Patient will be able to ambulate a flight of stairs with no handrail and report no LOB. 3. Patient will be able to walk 1/2 mile without requiring a rest break. Frequency / Duration: Patient to be seen 2 times per week for 8 weeks.       PLAN  [x]  Upgrade activities as tolerated     [x]  Continue plan of care  []  Update interventions per flow sheet       []  Discharge due to:_  []  Other:_      Bessie Monteiro  PTA   5/7/2019

## 2019-05-09 ENCOUNTER — HOSPITAL ENCOUNTER (OUTPATIENT)
Dept: PHYSICAL THERAPY | Age: 39
Discharge: HOME | End: 2019-05-09
Payer: COMMERCIAL

## 2019-05-09 PROCEDURE — 97110 THERAPEUTIC EXERCISES: CPT | Performed by: PHYSICAL THERAPIST

## 2019-05-09 PROCEDURE — 97112 NEUROMUSCULAR REEDUCATION: CPT | Performed by: PHYSICAL THERAPIST

## 2019-05-09 NOTE — PROGRESS NOTES
PT DAILY TREATMENT NOTE 2-15    Patient Name: Mariah Haley  Date:2019  : 1980  [x]  Patient  Verified  Payor: Santi Mayra / Plan: [address] / Product Type: Managed Care Medicaid /    In time: 2:30p  Out time: 3:25p  Total Treatment Time (min): 55  Visit #: 12    Treatment Area: Gait instability [R26.81]  Multiple sclerosis [G35]    SUBJECTIVE  Pain Level (0-10 scale): 0  Any medication changes, allergies to medications, adverse drug reactions, diagnosis change, or new procedure performed?: [x] No    [] Yes (see summary sheet for update)  Subjective functional status/changes:   [] No changes reported   Patient feels ready for discharge. OBJECTIVE    30 min Therapeutic Exercise:  [x] See flow sheet :   Rationale: increase ROM and increase strength to improve the patients ability to sit, stand, lift, carry, reach, ambulate and complete ADL's    25 min Neuromuscular Re-education:  [x]  See flow sheet :   Rationale: increase ROM, increase strength, improve coordination, improve balance and increase proprioception  to improve the patients ability to  sit, stand, lift, carry, reach, ambulate and complete ADL's       With   [] TE   [] TA   [] neuro   [] other: Patient Education: [x] Review HEP    [] Progressed/Changed HEP based on:   [] positioning   [] body mechanics   [] transfers   [] heat/ice application    [] other:      Other Objective/Functional Measures:     Pain Level (0-10 scale) post treatment: 0    ASSESSMENT/Changes in Function:     Patient will continue to benefit from skilled PT services to modify and progress therapeutic interventions, address functional mobility deficits, address ROM deficits, address strength deficits, analyze and address soft tissue restrictions, analyze and cue movement patterns, analyze and modify body mechanics/ergonomics, assess and modify postural abnormalities and address imbalance/dizziness to attain remaining goals.      []  See Plan of Care  []  See progress note/recertification  [x]  See Discharge Summary. PLAN  [x]  Upgrade activities as tolerated     [x]  Continue plan of care  []  Update interventions per flow sheet       []  Discharge due to:_  []  Other:_      Lilibeth Hampton, PT  , DPT, OCS, Cert.  DN     5/9/2019

## 2019-06-12 ENCOUNTER — OFFICE VISIT (OUTPATIENT)
Dept: BEHAVIORAL/MENTAL HEALTH CLINIC | Age: 39
End: 2019-06-12

## 2019-06-12 VITALS
SYSTOLIC BLOOD PRESSURE: 107 MMHG | BODY MASS INDEX: 42.12 KG/M2 | DIASTOLIC BLOOD PRESSURE: 76 MMHG | HEART RATE: 90 BPM | WEIGHT: 268.4 LBS | HEIGHT: 67 IN

## 2019-06-12 DIAGNOSIS — F06.31 MOOD DISORDER WITH DEPRESSIVE FEATURES DUE TO GENERAL MEDICAL CONDITION: ICD-10-CM

## 2019-06-12 DIAGNOSIS — F41.9 ANXIETY: ICD-10-CM

## 2019-06-12 DIAGNOSIS — F43.21 GRIEF: ICD-10-CM

## 2019-06-12 DIAGNOSIS — F33.2 MODERATELY SEVERE RECURRENT MAJOR DEPRESSION (HCC): Primary | ICD-10-CM

## 2019-06-12 RX ORDER — ARIPIPRAZOLE 10 MG/1
TABLET ORAL
Qty: 30 TAB | Refills: 2 | Status: SHIPPED | OUTPATIENT
Start: 2019-06-12 | End: 2019-09-26 | Stop reason: SDUPTHER

## 2019-06-12 RX ORDER — BENZTROPINE MESYLATE 0.5 MG/1
TABLET ORAL
Qty: 60 TAB | Refills: 2 | Status: SHIPPED | OUTPATIENT
Start: 2019-06-12 | End: 2019-09-20 | Stop reason: SDUPTHER

## 2019-06-12 NOTE — PATIENT INSTRUCTIONS
Recovering From Depression: Care Instructions  Your Care Instructions    Taking good care of yourself is important as you recover from depression. In time, your symptoms will fade as your treatment takes hold. Do not give up. Instead, focus your energy on getting better. Your mood will improve. It just takes some time. Focus on things that can help you feel better, such as being with friends and family, eating well, and getting enough rest. But take things slowly. Do not do too much too soon. You will begin to feel better gradually. Follow-up care is a key part of your treatment and safety. Be sure to make and go to all appointments, and call your doctor if you are having problems. It's also a good idea to know your test results and keep a list of the medicines you take. How can you care for yourself at home? Be realistic  · If you have a large task to do, break it up into smaller steps you can handle, and just do what you can. · You may want to put off important decisions until your depression has lifted. If you have plans that will have a major impact on your life, such as marriage, divorce, or a job change, try to wait a bit. Talk it over with friends and loved ones who can help you look at the overall picture first.  · Reaching out to people for help is important. Do not isolate yourself. Let your family and friends help you. Find someone you can trust and confide in, and talk to that person. · Be patient, and be kind to yourself. Remember that depression is not your fault and is not something you can overcome with willpower alone. Treatment is necessary for depression, just like for any other illness. Feeling better takes time, and your mood will improve little by little. Stay active  · Stay busy and get outside. Take a walk, or try some other light exercise. · Talk with your doctor about an exercise program. Exercise can help with mild depression. · Go to a movie or concert.  Take part in a [employer] activity or other social gathering. Go to a [employer] game. · Ask a friend to have dinner with you. Take care of yourself  · Eat a balanced diet with plenty of fresh fruits and vegetables, whole grains, and lean protein. If you have lost your appetite, eat small snacks rather than large meals. · Avoid drinking alcohol or using illegal drugs. Do not take medicines that have not been prescribed for you. They may interfere with medicines you may be taking for depression, or they may make your depression worse. · Take your medicines exactly as they are prescribed. You may start to feel better within 1 to 3 weeks of taking antidepressant medicine. But it can take as many as 6 to 8 weeks to see more improvement. If you have questions or concerns about your medicines, or if you do not notice any improvement by 3 weeks, talk to your doctor. · If you have any side effects from your medicine, tell your doctor. Antidepressants can make you feel tired, dizzy, or nervous. Some people have dry mouth, constipation, headaches, sexual problems, or diarrhea. Many of these side effects are mild and will go away on their own after you have been taking the medicine for a few weeks. Some may last longer. Talk to your doctor if side effects are bothering you too much. You might be able to try a different medicine. · Get enough sleep. If you have problems sleeping:  ? Go to bed at the same time every night, and get up at the same time every morning. ? Keep your bedroom dark and quiet. ? Do not exercise after 5:00 p.m.  ? Avoid drinks with caffeine after 5:00 p.m. · Avoid sleeping pills unless they are prescribed by the doctor treating your depression. Sleeping pills may make you groggy during the day, and they may interact with other medicine you are taking. · If you have any other illnesses, such as diabetes, heart disease, or high blood pressure, make sure to continue with your treatment.  Tell your doctor about all of the medicines you take, including those with or without a prescription. · Keep the numbers for these national suicide hotlines: 2-427-086-TALK (1-607.195.3097) and 3-100-ZBZRVSH (3-849.188.3266). If you or someone you know talks about suicide or feeling hopeless, get help right away. When should you call for help? Call 911 anytime you think you may need emergency care. For example, call if:    · You feel like hurting yourself or someone else.     · Someone you know has depression and is about to attempt or is attempting suicide.   [facility] your doctor now or seek immediate medical care if:    · You hear voices.     · Someone you know has depression and:  ? Starts to give away his or her possessions. ? Uses illegal drugs or drinks alcohol heavily. ? Talks or writes about death, including writing suicide notes or talking about guns, knives, or pills. ? Starts to spend a lot of time alone. ? Acts very aggressively or suddenly appears calm.    Watch closely for changes in your health, and be sure to contact your doctor if:    · You do not get better as expected. Where can you learn more? Go to http://luis daniel-rashawn.info/. Enter V540 in the search box to learn more about \"Recovering From Depression: Care Instructions. \"  Current as of: September 11, 2018  Content Version: 11.9  © 4189-8941 [employer]. Care instructions adapted under license by [facility] (which disclaims liability or warranty for this information). If you have questions about a medical condition or this instruction, always ask your healthcare professional. [place] [address] any warranty or liability for your use of this information. Preventing a Relapse of Depression: Care Instructions  Your Care Instructions    A relapse of depression means your symptoms have come back after you have gotten better. This illness often comes and goes during a lifetime.  But there are many things you can do to keep it from coming back. Follow-up care is a key part of your treatment and safety. Be sure to make and go to all appointments, and call your doctor if you are having problems. It's also a good idea to know your test results and keep a list of the medicines you take. What do you need to know? Know your risk of relapse  Talk to your doctor to find out if you are at risk of relapse. Many things can make a person more likely to relapse into depression. These include having a family member with depression, dealing with serious problems in a relationship or a job, having a serious medical condition, or abusing drugs or alcohol. It is important to know your risk and to recognize warning signs of relapse. Once you know these things, you will be better able to keep it from happening to you. Know the warning signs of relapse  The two most common signs of relapse are:  · Feeling sad or hopeless. · Losing interest in your daily activities. You may have other symptoms, such as:  · You lose or gain weight. · You sleep too much or not enough. · You feel restless and unable to sit still. · You feel unable to move. · You feel tired all the time. · You feel unworthy or guilty without an obvious reason. · You have problems concentrating, remembering, or making decisions. · You think often about death or suicide. · You feel angry or have panic attacks. How can you care for yourself at home? · Take your medicine as prescribed. Call your doctor if you have any problems with your medicine. Many people take their medicines for at least 6 months after they have recovered. This often helps keep symptoms from coming back. However, if your depression keeps coming back, you may have to take medicine for the rest of your life. · Continue counseling even after you have stopped taking medicine. · Eat healthy foods. Include fruits, vegetables, beans, and whole grains in your diet each day.   · Get at least 30 minutes of exercise on most days of the week. Walking is a good choice. You also may want to do other activities, such as running, swimming, cycling, or playing tennis or team sports. · See your doctor right away if you have new symptoms or feel that your depression is coming back. · Keep a regular sleep schedule. Try for 8 hours of sleep a night. · Avoid alcohol and illegal drugs. · Keep the numbers for these national suicide hotlines: 8-508-105-TALK (5-577.970.7132) and 9-384-COJEXGE (5-815.302.5230). If you or someone you know talks about suicide or feeling hopeless, get help right away. When should you call for help? Call 911 anytime you think you may need emergency care. For example, call if:    · You are thinking about suicide or are threatening suicide.     · You feel you cannot stop from hurting yourself or someone else.     · You hear or see things that aren't real.     · You think or speak in a bizarre way that is not like your usual behavior.    Call your doctor now or seek immediate medical care if:    · You are drinking a lot of alcohol or using illegal drugs.     · You are talking or writing about death.    Watch closely for changes in your health, and be sure to contact your doctor if:    · You find it hard or it's getting harder to deal with school, a job, family, or friends.     · You think your treatment is not helping or you are not getting better.     · Your symptoms get worse or you get new symptoms.     · You have any problems with your antidepressant medicines, such as side effects, or you are thinking about stopping your medicine.     · You are having manic behavior, such as having very high energy, needing less sleep than normal, or showing risky behavior such as spending money you don't have or abusing others verbally or physically. Where can you learn more? Go to http://luis daniel-rashawn.info/.   Enter U311 in the search box to learn more about \"Preventing a Relapse of Depression: Care Instructions. \"  Current as of: September 11, 2018  Content Version: 11.9  © 7007-7350 codet[employer], [facility]. Care instructions adapted under license by [employer] (which disclaims liability or warranty for this information). If you have questions about a medical condition or this instruction, always ask your healthcare professional. [place] [address] any warranty or liability for your use of this information.

## 2019-06-12 NOTE — PROGRESS NOTES
Ambulatory Initial Psychiatric Evaluation     Chief Complaint: \" my psychiatrist did not medicaid\"     History of Present Illness: Parish Emerson is a 41 y.o. AA female who presents with symptoms of depression and anxiety     Patient reports/evidences the following emotional symptoms: client  Has genetic loading for a psychiatric d/o and no personal history of substance abuse. No abuse history noted. PMH is significant for Multiple sclerosis and restless leg syn. Client i did not bring any medical records from previous provider. Reported sleeping for 9 hrs and has reported difficulty initiating  Sleep. reporetd melatonin benefits . Reported has fair appetite, interest, has fair energy, has motivation and able to focus and concentrate. Denied any hopelessness or helplessness sor passive suicide thoughts. Denied any symptoms of destiny or psychosis. Additional symptomatology include anxiety and is stable at this time and takes prn . The above symptoms have been present for a few years post diagnoses of MS. The patient reports onset of symptoms in . These symptoms are of low severity as per patient's report. The symptoms are variable in nature. The patient's condition has been precipitated by and condition worsened with stressors. Stressors/life events: MS and restless leg, job was stressful, .mother  on 2018  Pt denied any flashbacks, hypervigilance or avoidance or reexperience or night medina. Pt denied any h/o seizures or head trauma or neurological problems. Client denied any SI or any plan or intent; denied HI or SIB. There is no evidence of hallucinations, psychosis or destiny at this time.      Past Psychiatric History:      Previous psychiatric care: admits   2004 till 2017- Multiple sclerosis and stresors- depression- citalopram   2017 til now- Chandler Guo- Josytenika and abilify      Previous suicide attempts: denied      Previous self injurious behavior: No     Previous Psych Hospitalizations:admits   2017- [facility]- citaloram not benefiting - trintellix and abilify      Current psychotropics: rintellix and abilify            Previous psychiatric medications/ECT/TMS: admits    citalopram, viortexitine,    rexalti- worsened depression and SI          Past history of SA,rehab, detox, withdrawal:denied    Social History:   Social History     Socioeconomic History    Marital status: SINGLE     Spouse name: Not on file    Number of children: Not on file    Years of education: Not on file    Highest education level: Not on file   Tobacco Use    Smoking status: Never Smoker    Smokeless tobacco: Never Used   Substance and Sexual Activity    Alcohol use: Yes     Comment: occasionally    Drug use: No    Sexual activity: Never     Partners: Male     Birth control/protection: Pill        Ethnic:   Relationship Status: single  Kids: none  Living Situation: Alone   Born: [place]  Raised by: parents  Siblings: 1 sister  Education:  Masters in Public admin  Employment: on permanent disability  Tobacco:  no tobacco use  Caffeine: caffeine intake: 1 cups of caffeinated coffee per day(s)  Alcohol: alcohol intake:social drinker  Illicit Drug Social History:  no history of illicit drug use  Hobbies:  music   Abuse: denied  Sexual:  heterosexual  Support: family  Legal: denied    Family History:   Family History   Problem Relation Age of Onset    Diabetes Mother     Hypertension Mother     MS Mother     Cancer Mother         breast    Bipolar Disorder Father     No Known Problems Sister     Breast Cancer Maternal Grandmother         Family Psychiatric history: Her father has bipolar ds and is on meds. There is no history of suicide attempt in the family.     Past Medical/Surgical History:   Past Medical History:   Diagnosis Date    Abnormal pap 3/2015; 4/2016; 5/16/17 2015 LGSIL +HPV; 2013 neg pap +HPV;4/2016 neg pap +HPV; 5/16/17 Neg pap +HPV    Anemia     Depression  Eczema     H/O colposcopy with cervical biopsy 3/15 + 10/2013    neg    History of seizure 4/15/2016    One in 2008    Hypercholesterolemia 5/22/2018    Morbid obesity ([address]     Multiple sclerosis (HCC)     Psychiatric disorder     depression    Seizures (HCC)          Allergies: Allergies   Allergen Reactions    Amoxicillin Hives    Penicillins Hives        Medication List:   Current Outpatient Medications   Medication Sig Dispense Refill    norethindrone-ethinyl estradiol (CYCLAFEM 1/35, 28,) 1-35 mg-mcg tab Take 1 Tab by mouth daily. 1 Package 1    ferrous sulfate 325 mg (65 mg iron) tablet Take 1 Tab by mouth Daily (before breakfast).  benztropine (COGENTIN) 0.5 mg tablet TK 1 T PO BID  1    ARIPiprazole (ABILIFY) 10 mg tablet TK 1 T PO D  1    vortioxetine (TRINTELLIX) 20 mg tablet Take  by mouth daily.  dimethyl fumarate (TECFIDERA) 240 mg cpDR Take  by mouth two (2) times a day. A comprehensive review of systems was negative except for that written in the HPI.     Psychiatric/Mental Status Examination:     MENTAL STATUS EXAM:  Sensorium  oriented to time, place and person   Orientation person, place, time/date, situation, day of week, month of year and year   Relations cooperative   Eye Contact appropriate   Appearance:  age appropriate, casually dressed, overweight and within normal Limits   Motor Behavior:  gait stable and within normal limits   Speech:  normal pitch and normal volume   Vocabulary average   Thought Process: goal directed, logical and within normal limits   Thought Content free of delusions and free of hallucinations   Suicidal ideations none   Homicidal ideations none   Mood:  euthymic   Affect:  euthymic and mood-congruent   Memory recent  adequate   Memory remote:  adequate   Concentration:  adequate   Abstraction:  abstract   Insight:  fair   Reliability fair   Judgment:  fair     Labs:  Results for orders placed or performed in visit on 03/28/19 STRATIFY JCV AB WITH INDEX W/RELEX INHIBITION ASSAY   Result Value Ref Range    INDEX VALUE 2.46 (H)     JCV Ab POSITIVE (A)          Assessment:  The client, Alexandra Navarro is a 41 y.o. AA female presents with depression and anxiety. reported has been stable for 1 year on vortioxetine and abilify. reported takes lorazepam prn for anxiety and lst use was one month ago. Plan to continue abilify and vortioxetine to target depression and anxiety. Reporetd has akathisia with abilify and benztropine is benefiting. Plan to adjust the medications as per the response and tolerability. she is attending grief therapy at [facility]. Discussed importance of psychotherapy in her treatment plan. Reviewed labs and records. Patient denies SI/HI/SIB. No evidence of AH/VH or delusions. Possible organic causes contributing are: MS and restless leg  Reviewed medical admissions and discussed with the patient. Client is medically . .stable. Vitals stable    PHQ 9 score:13- moderate depression  HAM: 9-mild  anxiety   mood disorder questionnaire:negative    Diagnosis: moderately severe depression, depression due to medical condition, , anxiety, grief    Treatment Plan:   1. Medication: continue viortexetine 20 mg daily                          Continue abilify 10 mg HS                           Continue benztropine 0.5 mg BID                              Labs ordered-  TSH, Vit D                              Continue grief therapy     2. Discussed:  the risks and benefits of the proposed medication  the potential medication side effects  dry mouth, GI disturbance, impotence, insomnia, libido decreased, weight gain, weight loss, somnolence, tremor, tardive dyskinesia  patient given opportunity to ask questions  off label use of an approved drug/prescription discussed with patient      3. Psychotherapy: Recommended: CBT-   4. Medical: PCP  5.  Return to Clinic:  or sooner prn    The risk versus benefits of treatment were discussed and side effects explained. Patient agreed with plan. Patient instructed to call with any side effects.   - Instructed patient to call the clinic, and if after hours call the provider on call if experiences any suicidal thought or ideas to hurt herself or other. Also instructed to call 911 or go to the ED. Patient verbalized understanding and agreed to call. Patient was given an after visit summary or informed of [facility] which includes patient instructions, diagnoses, current medications, & vitals.       Time spent with Patient:  30 to 74 minutes    Annika Dougherty NP  6/12/2019

## 2019-06-12 NOTE — PROGRESS NOTES
.  Learning Assessment 6/12/2019   PRIMARY LEARNER Patient   HIGHEST LEVEL OF EDUCATION - PRIMARY LEARNER  4 YEARS OF COLLEGE   BARRIERS PRIMARY LEARNER -   CO-LEARNER CAREGIVER -   PRIMARY LANGUAGE ENGLISH   LEARNER PREFERENCE PRIMARY DEMONSTRATION     -   ANSWERED BY Jessica Cochran [address]

## 2019-06-13 ENCOUNTER — OFFICE VISIT (OUTPATIENT)
Dept: NEUROLOGY | Age: 39
End: 2019-06-13

## 2019-06-13 VITALS
WEIGHT: 267 LBS | RESPIRATION RATE: 16 BRPM | BODY MASS INDEX: 41.91 KG/M2 | SYSTOLIC BLOOD PRESSURE: 110 MMHG | DIASTOLIC BLOOD PRESSURE: 76 MMHG | TEMPERATURE: 98.1 F | HEART RATE: 103 BPM | OXYGEN SATURATION: 97 % | HEIGHT: 67 IN

## 2019-06-13 DIAGNOSIS — Z51.81 MEDICATION MONITORING ENCOUNTER: ICD-10-CM

## 2019-06-13 DIAGNOSIS — G35 MS (MULTIPLE SCLEROSIS) (HCC): Primary | ICD-10-CM

## 2019-06-13 NOTE — PROGRESS NOTES
[facility] and [address] at [facility] at [address] [facility], [address] [facility] [address] [facility], [address] [place] [address]   [phone]              Chief Complaint   Patient presents with    Multiple Sclerosis     Current Outpatient Medications   Medication Sig Dispense Refill    vortioxetine (TRINTELLIX) 20 mg tablet Take 1 Tab by mouth daily. 30 Tab 2    benztropine (COGENTIN) 0.5 mg tablet TK 1 T PO BID 60 Tab 2    ARIPiprazole (ABILIFY) 10 mg tablet TK 1 T PO Daily 30 Tab 2    norethindrone-ethinyl estradiol (CYCLAFEM 1/35, 28,) 1-35 mg-mcg tab Take 1 Tab by mouth daily. 1 Package 1    ferrous sulfate 325 mg (65 mg iron) tablet Take 1 Tab by mouth Daily (before breakfast).  dimethyl fumarate (TECFIDERA) 240 mg cpDR Take  by mouth two (2) times a day. Allergies   Allergen Reactions    Amoxicillin Hives    Penicillins Hives     Social History     Tobacco Use    Smoking status: Never Smoker    Smokeless tobacco: Never Used   Substance Use Topics    Alcohol use: Yes     Comment: occasionally    Drug use: No     Patient returns today for follow-up multiple sclerosis. She is on Tecfidera. We still have not gotten records from Dr. Heather Montiel. She has not had any exacerbation. Tolerating medicine without difficulty. She is JOHANN virus positive. Her lymphocyte count is adequate. She has not had any sign or symptom of exacerbation. No perceived side effects. Examination  Visit Vitals  /76 (BP 1 Location: Left arm, BP Patient Position: Sitting)   Pulse (!) 103   Temp 98.1 °F (36.7 °C) (Oral)   Resp 16   Ht 5' 7\" (1.702 m)   Wt 121.1 kg (267 lb)   LMP 06/11/2019 Comment: birth contol   SpO2 97%   BMI 41.82 kg/m²   She is a pleasant lady. She is awake alert and oriented. Beats and language are normal.  She has no nystagmus with full versions. No pronation or drift.   She resists fully in all muscle groups in the upper extremities. She has mild left lower extremity weakness. Brisk reflexes throughout although more brisk left versus right. Impression/Plan  Multiple sclerosis stable at this point. Continue Tecfidera. Given the JOHANN virus positivity check CBC q. 6 months    Follow-up with the first of the year certainly sooner should circumstances dictate. Elodia Castellanos MD      This note was created using voice recognition software. Despite editing, there may be syntax errors. This note will not be viewable in [address]

## 2019-06-20 ENCOUNTER — OFFICE VISIT (OUTPATIENT)
Dept: OBGYN CLINIC | Age: 39
End: 2019-06-20

## 2019-06-20 VITALS
DIASTOLIC BLOOD PRESSURE: 95 MMHG | WEIGHT: 266 LBS | HEIGHT: 67 IN | SYSTOLIC BLOOD PRESSURE: 133 MMHG | BODY MASS INDEX: 41.75 KG/M2

## 2019-06-20 DIAGNOSIS — Z01.419 ENCOUNTER FOR GYNECOLOGICAL EXAMINATION (GENERAL) (ROUTINE) WITHOUT ABNORMAL FINDINGS: Primary | ICD-10-CM

## 2019-06-20 DIAGNOSIS — R03.0 ELEVATED BLOOD PRESSURE READING: ICD-10-CM

## 2019-06-20 NOTE — PATIENT INSTRUCTIONS

## 2019-06-20 NOTE — PROGRESS NOTES
Ana Pena is a [de-identified] P5,  41 y.o. female [address] whose Patient's last menstrual period was 06/11/2019. who presents for her annual checkup. She is having no significant problems. With regard to the Gardisil vaccine, she is older than the FDA approved age to receive it. Menstrual status:    Her periods are moderate in flow. She is using three to five pads or tampons per day, usually regular every 26-30 days. She denies dysmenorrhea. She reports no premenstrual symptoms. Contraception:    The current method of family planning is abstinence and She declines contraception and counseling. Sexual history:    She  reports that she does not engage in sexual activity. Medical conditions:    Since her last annual GYN exam about 6/5/18 ago, she has not the following changes in her health history: none. Has hx of HPV    Pap and Mammogram History:    Her most recent Pap smear was normal/-HPV obtained 6/5/18 year(s) ago. The patient has never had a mammogram.    The patient does not have a family history of breast cancer. Past Medical History:   Diagnosis Date    Abnormal pap 3/2015; 4/2016; 5/16/17 2015 LGSIL +HPV; 2013 neg pap +HPV;4/2016 neg pap +HPV; 5/16/17 Neg pap +HPV    Anemia     Depression     Eczema     H/O colposcopy with cervical biopsy 3/15 + 10/2013    neg    History of seizure 4/15/2016    One in 2008    Hypercholesterolemia 5/22/2018    Morbid obesity ([address]     Multiple sclerosis ([address]     Psychiatric disorder     depression    Seizures (HCC)      Past Surgical History:   Procedure Laterality Date    HX BUNIONECTOMY      HX CHOLECYSTECTOMY      Robotic-assisted laparoscopic cholecystectomy with firefly.  HX COLPOSCOPY  6/2016; 6/8/17    neg    HX GYN      HX LEEP PROCEDURE  6/08    neg    HX ORTHOPAEDIC Left 2012    Left bunionectomy.  HX ORTHOPAEDIC Right     Right bunionectomy.        Current Outpatient Medications   Medication Sig Dispense Refill    vortioxetine (TRINTELLIX) 20 mg tablet Take 1 Tab by mouth daily. 30 Tab 2    benztropine (COGENTIN) 0.5 mg tablet TK 1 T PO BID 60 Tab 2    ARIPiprazole (ABILIFY) 10 mg tablet TK 1 T PO Daily 30 Tab 2    norethindrone-ethinyl estradiol (CYCLAFEM 1/35, 28,) 1-35 mg-mcg tab Take 1 Tab by mouth daily. 1 Package 1    ferrous sulfate 325 mg (65 mg iron) tablet Take 1 Tab by mouth Daily (before breakfast).  dimethyl fumarate (TECFIDERA) 240 mg cpDR Take  by mouth two (2) times a day.        Allergies: Amoxicillin and Penicillins   Social History     Socioeconomic History    Marital status: SINGLE     Spouse name: Not on file    Number of children: Not on file    Years of education: Not on file    Highest education level: Not on file   Occupational History    Not on file   Social Needs    Financial resource strain: Not on file    Food insecurity:     Worry: Not on file     Inability: Not on file    Transportation needs:     Medical: Not on file     Non-medical: Not on file   Tobacco Use    Smoking status: Never Smoker    Smokeless tobacco: Never Used   Substance and Sexual Activity    Alcohol use: Yes     Comment: occasionally    Drug use: No    Sexual activity: Never     Partners: Male     Birth control/protection: Pill   Lifestyle    Physical activity:     Days per week: Not on file     Minutes per session: Not on file    Stress: Not on file   Relationships    Social connections:     Talks on phone: Not on file     Gets together: Not on file     Attends [employer] service: Not on file     Active member of club or organization: Not on file     Attends meetings of clubs or organizations: Not on file     Relationship status: Not on file    Intimate partner violence:     Fear of current or ex partner: Not on file     Emotionally abused: Not on file     Physically abused: Not on file     Forced sexual activity: Not on file   Other Topics Concern    Not on file   Social History Narrative    Not on file     Tobacco History:  reports that she has never smoked. She has never used smokeless tobacco.  Alcohol Abuse:  reports that she drinks alcohol. Drug Abuse:  reports that she does not use drugs.     Patient Active Problem List   Diagnosis Code    Depression, major, single episode, moderate ([address] F32.1    History of seizure Z87.898    MS (multiple sclerosis) ([facility] G35    History of cervical dysplasia Z87.410    Intrinsic eczema L20.84    Fatigue due to depression F32.9, R53.83    Obesity, Class II, BMI 35-39.9 E66.9    S/P laparoscopic cholecystectomy Z90.49    Hypercholesterolemia E78.00    Obesity, morbid (HCC) E66.01       Review of Systems - History obtained from the patient  Constitutional: negative for weight loss, fever, night sweats  HEENT: negative for hearing loss, earache, congestion, snoring, sorethroat  CV: negative for chest pain, palpitations, edema  Resp: negative for cough, shortness of breath, wheezing  GI: negative for change in bowel habits, abdominal pain, black or bloody stools  : negative for frequency, dysuria, hematuria, vaginal discharge  MSK: negative for back pain, joint pain, muscle pain  Breast: negative for breast lumps, nipple discharge, galactorrhea  Skin :negative for itching, rash, hives  Neuro: negative for dizziness, headache, confusion, weakness  Psych: negative for anxiety, depression, change in mood  Heme/lymph: negative for bleeding, bruising, pallor    Physical Exam    Visit Vitals  BP (!) 133/95   Ht 5' 7\" (1.702 m)   Wt 266 lb (120.7 kg)   LMP 06/11/2019 Comment: birth contol   BMI 41.66 kg/m²       Constitutional  · Appearance: well-nourished, well developed, alert, in no acute distress    HENT  · Head and Face: appears normal    Neck  · Inspection/Palpation: normal appearance, no masses or tenderness  · Lymph Nodes: no lymphadenopathy present  · Thyroid: gland size normal, nontender, no nodules or masses present on palpation    Chest  · Respiratory Effort: breathing normal  · Auscultation: normal breath sounds    Cardiovascular  · Heart:  · Auscultation: regular rate and rhythm without murmur    Breasts  · Inspection of Breasts: breasts symmetrical, no skin changes, no discharge present, nipple appearance normal, no skin retraction present  · Palpation of Breasts and Axillae: no masses present on palpation, no breast tenderness  · Axillary Lymph Nodes: no lymphadenopathy present    Gastrointestinal  · Abdominal Examination: abdomen non-tender to palpation, normal bowel sounds, no masses present  · Liver and spleen: no hepatomegaly present, spleen not palpable  · Hernias: no hernias identified    Genitourinary  · External Genitalia: normal appearance for age, no discharge present, no tenderness present, no inflammatory lesions present, no masses present, no atrophy present  · Vagina: normal vaginal vault without central or paravaginal defects, no discharge present, no inflammatory lesions present, no masses present  · Bladder: non-tender to palpation  · Urethra: appears normal  · Cervix: normal   · Uterus:enlarged  · Adnexa: no adnexal tenderness present, no adnexal masses present  · Perineum: perineum within normal limits, no evidence of trauma, no rashes or skin lesions present  · Anus: anus within normal limits, no hemorrhoids present  · Inguinal Lymph Nodes: no lymphadenopathy present    Skin  · General Inspection: no rash, no lesions identified    Neurologic/Psychiatric  · Mental Status:  · Orientation: grossly oriented to person, place and time  · Mood and Affect: mood normal, affect appropriate    .   Assessment:  Routine gynecologic examination  Uterus enlarged - possible fibroids  HTN    Plan:  Counseled re: diet, exercise, healthy lifestyle  Return for yearly wellness visits  Stop OC - repeat BP also elevated  US  Pap 2021  Consider Kyleena if periods still heavy

## 2019-06-21 ENCOUNTER — PATIENT MESSAGE (OUTPATIENT)
Dept: NEUROLOGY | Age: 39
End: 2019-06-21

## 2019-06-21 DIAGNOSIS — G35 MS (MULTIPLE SCLEROSIS) (HCC): Primary | ICD-10-CM

## 2019-06-21 LAB
25(OH)D3+25(OH)D2 SERPL-MCNC: 15.8 NG/ML (ref 30–100)
BASOPHILS # BLD AUTO: 0.1 X10E3/UL (ref 0–0.2)
BASOPHILS NFR BLD AUTO: 1 %
EOSINOPHIL # BLD AUTO: 0.1 X10E3/UL (ref 0–0.4)
EOSINOPHIL NFR BLD AUTO: 1 %
ERYTHROCYTE [DISTWIDTH] IN BLOOD BY AUTOMATED COUNT: 15.4 % (ref 12.3–15.4)
HCT VFR BLD AUTO: 29.5 % (ref 34–46.6)
HGB BLD-MCNC: 9.6 G/DL (ref 11.1–15.9)
IMM GRANULOCYTES # BLD AUTO: 0 X10E3/UL (ref 0–0.1)
IMM GRANULOCYTES NFR BLD AUTO: 0 %
LYMPHOCYTES # BLD AUTO: 2.1 X10E3/UL (ref 0.7–3.1)
LYMPHOCYTES NFR BLD AUTO: 30 %
MCH RBC QN AUTO: 24.6 PG (ref 26.6–33)
MCHC RBC AUTO-ENTMCNC: 32.5 G/DL (ref 31.5–35.7)
MCV RBC AUTO: 76 FL (ref 79–97)
MONOCYTES # BLD AUTO: 0.4 X10E3/UL (ref 0.1–0.9)
MONOCYTES NFR BLD AUTO: 5 %
NEUTROPHILS # BLD AUTO: 4.3 X10E3/UL (ref 1.4–7)
NEUTROPHILS NFR BLD AUTO: 63 %
PLATELET # BLD AUTO: 409 X10E3/UL (ref 150–450)
RBC # BLD AUTO: 3.9 X10E6/UL (ref 3.77–5.28)
TSH SERPL DL<=0.005 MIU/L-ACNC: 1.32 UIU/ML (ref 0.45–4.5)
WBC # BLD AUTO: 6.9 X10E3/UL (ref 3.4–10.8)

## 2019-06-21 NOTE — TELEPHONE ENCOUNTER
Regarding: FW: Prescription Question  Contact: [phone]      ----- Message -----  From: Mariah Haley  Sent: 6/21/2019   2:59 PM  To: Avery Grullon  Subject: Prescription Question                            ----- Message from [address] [place] [address] 951, Generic sent at 6/21/2019  2:59 PM EDT -----    [facility] special[facility] pharmacy said they need a new prescription from dr Peyman Leiva for my tecfidera     Thanks

## 2019-06-23 ENCOUNTER — TELEPHONE (OUTPATIENT)
Dept: FAMILY MEDICINE CLINIC | Age: 39
End: 2019-06-23

## 2019-06-24 ENCOUNTER — TELEPHONE (OUTPATIENT)
Dept: NEUROLOGY | Age: 39
End: 2019-06-24

## 2019-06-24 RX ORDER — DIMETHYL FUMARATE 240 MG/1
1 CAPSULE ORAL 2 TIMES DAILY
Qty: 180 CAP | Refills: 3 | Status: SHIPPED | OUTPATIENT
Start: 2019-06-24 | End: 2020-03-05 | Stop reason: SDUPTHER

## 2019-06-24 NOTE — TELEPHONE ENCOUNTER
Called and spoke with pt, and she has been advised and states understanding of results and recommendation. Pt will keep her 07/11/2019 appointment to discuss.

## 2019-06-24 NOTE — TELEPHONE ENCOUNTER
Please call patient and let her know she is anemic again. She needs to schedule an office visit about this. I have not seen her in over a year.

## 2019-06-24 NOTE — TELEPHONE ENCOUNTER
Regarding: FW: Prescription Question  Contact: [phone]      ----- Message -----  From: [employer]  Sent: 6/24/2019  12:44 PM  To: Lata Shrestha [facility]  Subject: RE: Prescription Question                        ----- Message from [address] [place] [address] [address], Generic sent at 6/24/2019 12:44 PM EDT -----    can the dr please call in my prescription today? I'm out of Tecfidera.    ----- Message -----  From: Kacie Bang RN  Sent: 6/21/19, 4:56 PM  To: Alexandra Uatsdin  Subject: RE: Prescription Question    Forwarded to Dr. Lester Casillas as Dr. Geovany Leonard is out of country until 7/15/19    Kacie Bang RN      ----- Message -----     From: Alexandra Uatsdin     Sent: 6/21/2019  2:59 PM EDT       To: Holly Melgar MD  Subject: Prescription Question    [facility] special[facility] pharmacy said they need a new prescription from dr Geovany Leonard for my tecfidera     Thanks

## 2019-06-24 NOTE — TELEPHONE ENCOUNTER
[place] AP[place] Tecfidera 240mg by [facility]. Effective dates 06/21/19 - 06/20/20. Case #[id number]. Please send med to Pharmacy if necessary. Approval scanned into media for review.

## 2019-07-09 NOTE — PROGRESS NOTES
[address] [place] [place] [address] Stephanie Pascual [address]  Phone: [phone]  Fax: [phone]    Discharge Summary  2-15    Patient name: Justina Felder  : 1980  Provider#: [id number]  Referral source: Joey Moore MD      Medical/Treatment Diagnosis: Gait instability [R26.81]  Multiple sclerosis [G35]     Prior Hospitalization: see medical history     Comorbidities: See Plan of Care  Prior Level of Function:See Plan of Care  Medications: Verified on Patient Summary List    Start of Care: 19      Onset Date:Declined over the past 6 months   Visits from Start of Care: 12     Missed Visits: 0  Reporting Period : 19 to 19      ASSESSMENT/SUMMARY OF CARE: Ms. Wan Gambino did very well with PT with a gradual progression of therapeutic exercises and neuro re-ed program. She achieved all long term goals and no longer requires PT services. Short Term Goals: To be accomplished in 8 treatments:  1. Patient will be to demonstrate SLS >5 seconds on both LE to allow for decreased fall risk. Met.  2. Patient will be able to ambulate for 50 ft. With no AD and no LOB. Met.  3. Patient will be able to demonstrate tandem stance with EC for >5 seconds to allow for improved ability to ambulate in the dark. Met. Long Term Goals: To be accomplished in 16 treatments:  1. Patient will be able to demonstrate SLS >15 seconds on both LE to allow for decreased fall risk. Met.  2. Patient will be able to ambulate a flight of stairs with no handrail and report no LOB. Met.  3. Patient will be able to walk 1/2 mile without requiring a rest break. Met. RECOMMENDATIONS:  [x]Discontinue therapy: [x]Patient has reached or is progressing toward set goals      []Patient is non-compliant or has abdicated      []Due to lack of appreciable progress towards set goals      []Other    Antelmo Sherman, PT , DPT, OCS, Cert.  DN   2019

## 2019-07-11 ENCOUNTER — HOSPITAL ENCOUNTER (OUTPATIENT)
Dept: GENERAL RADIOLOGY | Age: 39
Discharge: HOME | End: 2019-07-11
Payer: MEDICAID

## 2019-07-11 ENCOUNTER — OFFICE VISIT (OUTPATIENT)
Dept: FAMILY MEDICINE CLINIC | Age: 39
End: 2019-07-11

## 2019-07-11 VITALS
DIASTOLIC BLOOD PRESSURE: 82 MMHG | SYSTOLIC BLOOD PRESSURE: 123 MMHG | BODY MASS INDEX: 41.91 KG/M2 | HEART RATE: 107 BPM | RESPIRATION RATE: 18 BRPM | HEIGHT: 67 IN | WEIGHT: 267 LBS | TEMPERATURE: 96.7 F | OXYGEN SATURATION: 97 %

## 2019-07-11 DIAGNOSIS — R03.0 ELEVATED BLOOD PRESSURE READING: Primary | ICD-10-CM

## 2019-07-11 DIAGNOSIS — R06.02 SOB (SHORTNESS OF BREATH): ICD-10-CM

## 2019-07-11 DIAGNOSIS — D64.9 ANEMIA, UNSPECIFIED TYPE: ICD-10-CM

## 2019-07-11 PROCEDURE — 71046 X-RAY EXAM CHEST 2 VIEWS: CPT

## 2019-07-11 NOTE — PROGRESS NOTES
Chief Complaint   Patient presents with    Elevated Blood Pressure     discuss anemia     1. Have you been to the ER, urgent care clinic since your last visit? Hospitalized since your last visit? No     2. Have you seen or consulted any other health care providers outside of the [address] [place] [address] since your last visit? Include any pap smears or colon screening.  No

## 2019-07-11 NOTE — PROGRESS NOTES
HISTORY OF PRESENT ILLNESS  Bri Blackman is a 41 y.o. female. Gay Arredondo is here due to elevated blood pressure in the past.  Her gyn stopped her OCPs due to this. She brings in her home blood pressures and they have been 108-122/77-96. Also, she wants to discuss her anemia. She saw Hematology for this at the end of October and at that time her hemoglobin was normal, however she was supposed to get labs and follow up in 3 months, which she didn't do. Recently her hemoglobin was low again. She only takes her iron 3 days a week. Also, she has shortness of breath, even when coming from the bathroom to her room. This has been present for the past 3 months and is unchanged. Walking makes it worse. Resting makes it better. Review of Systems   Constitutional: Negative for weight loss. No weight gain   Eyes: Negative for blurred vision. Respiratory: Positive for shortness of breath. Negative for cough and wheezing. Cardiovascular: Negative for chest pain and leg swelling. Gastrointestinal: Negative for abdominal pain. Neurological: Negative for dizziness, sensory change, speech change, focal weakness and headaches. Visit Vitals  /82   Pulse (!) 107   Temp 96.7 °F (35.9 °C) (Oral)   Resp 18   Ht 5' 7\" (1.702 m)   Wt 267 lb (121.1 kg)   LMP 06/11/2019 Comment: birth contol   BMI 41.82 kg/m²     BP Readings from Last 3 Encounters:   06/20/19 (!) 133/95   06/13/19 110/76   06/12/19 107/76     Physical Exam   Constitutional: She is oriented to person, place, and time. She appears well-developed and well-nourished. No distress. Cardiovascular: Normal rate, regular rhythm and normal heart sounds. Exam reveals no gallop and no friction rub. No murmur heard. Pulmonary/Chest: Effort normal and breath sounds normal. No respiratory distress. She has no wheezes. She has no rales. Musculoskeletal: She exhibits no edema.    Neurological: She is alert and oriented to person, place, and time. Skin: Skin is warm and dry. She is not diaphoretic. Nursing note and vitals reviewed. ASSESSMENT and PLAN    ICD-10-CM ICD-9-CM    1. Elevated blood pressure reading R03.0 796.2    2. Anemia, unspecified type D64.9 285.9 CBC WITH AUTOMATED DIFF      REFERRAL TO HEMATOLOGY   3. SOB (shortness of breath) R06.02 786.05 XR CHEST PA LAT      CBC WITH AUTOMATED DIFF        Elevated blood pressures, sporadic  Anemia, non-compliant with iron, shortness of breath may be related  Labs per orders. Refer back to Hematology  chest X-ray  Take iron daily    Follow-up and Dispositions    · Return in about 6 weeks (around 8/22/2019) for blood pressure, shortness of breath. Reviewed plan of care. Patient has provided input and agrees with goals.

## 2019-07-12 LAB
BASOPHILS # BLD AUTO: 0.1 X10E3/UL (ref 0–0.2)
BASOPHILS NFR BLD AUTO: 1 %
EOSINOPHIL # BLD AUTO: 0.1 X10E3/UL (ref 0–0.4)
EOSINOPHIL NFR BLD AUTO: 2 %
ERYTHROCYTE [DISTWIDTH] IN BLOOD BY AUTOMATED COUNT: 15.8 % (ref 12.3–15.4)
HCT VFR BLD AUTO: 34.6 % (ref 34–46.6)
HGB BLD-MCNC: 10.6 G/DL (ref 11.1–15.9)
IMM GRANULOCYTES # BLD AUTO: 0 X10E3/UL (ref 0–0.1)
IMM GRANULOCYTES NFR BLD AUTO: 0 %
LYMPHOCYTES # BLD AUTO: 2.6 X10E3/UL (ref 0.7–3.1)
LYMPHOCYTES NFR BLD AUTO: 32 %
MCH RBC QN AUTO: 24.1 PG (ref 26.6–33)
MCHC RBC AUTO-ENTMCNC: 30.6 G/DL (ref 31.5–35.7)
MCV RBC AUTO: 79 FL (ref 79–97)
MONOCYTES # BLD AUTO: 0.6 X10E3/UL (ref 0.1–0.9)
MONOCYTES NFR BLD AUTO: 7 %
NEUTROPHILS # BLD AUTO: 4.9 X10E3/UL (ref 1.4–7)
NEUTROPHILS NFR BLD AUTO: 58 %
PLATELET # BLD AUTO: 410 X10E3/UL (ref 150–450)
RBC # BLD AUTO: 4.4 X10E6/UL (ref 3.77–5.28)
WBC # BLD AUTO: 8.3 X10E3/UL (ref 3.4–10.8)

## 2019-07-15 ENCOUNTER — TELEPHONE (OUTPATIENT)
Dept: FAMILY MEDICINE CLINIC | Age: 39
End: 2019-07-15

## 2019-07-22 ENCOUNTER — PATIENT MESSAGE (OUTPATIENT)
Dept: NEUROLOGY | Age: 39
End: 2019-07-22

## 2019-07-22 DIAGNOSIS — G35 MS (MULTIPLE SCLEROSIS) (HCC): Primary | ICD-10-CM

## 2019-07-22 DIAGNOSIS — R26.9 GAIT ABNORMALITY: ICD-10-CM

## 2019-07-22 NOTE — PROGRESS NOTES
[address] [facility] at [facility]  [phone]    Hematology / Oncology Established Visit    Reason for Visit:   Erica Campa is a 41 y.o. female who comes in for f/u of anemia. PCP is Dr. Fransisco Davis. History of Present Illness:   Ms. Michael Powell is a 41 y.o. female with MS, Depression, obesity who comes in for f/u of anemia. Review of labs reveals Hgb dropped down to 9.6 in 6/2019 and then increased to 10.6 in 7/2019. Pt states she does not take her iron pills regularly, but just recently restarted taking them once a day last week. This does occasionally cause her constipation, but no stomach upset. She states she has heavy periods and was taken off of her OCPs due to HTN. She underwent EGD, colonoscopy in 8/2018 followed by capsule endoscopy, negative for any source of bleeding. No ice cravings, melena/hematochezia, GERD. She has some SOB. Past Medical History:   Diagnosis Date    Abnormal pap 3/2015; 4/2016; 5/16/17 2015 LGSIL +HPV; 2013 neg pap +HPV;4/2016 neg pap +HPV; 5/16/17 Neg pap +HPV    Anemia     Depression     Eczema     H/O colposcopy with cervical biopsy 3/15 + 10/2013    neg    History of seizure 4/15/2016    One in 2008    Hypercholesterolemia 5/22/2018    Morbid obesity ([address]     Multiple sclerosis ([facility] [address] [facility]     Psychiatric disorder     depression    Seizures (HCC)       Past Surgical History:   Procedure Laterality Date    HX BUNIONECTOMY      HX CHOLECYSTECTOMY      Robotic-assisted laparoscopic cholecystectomy with firefly.  HX COLPOSCOPY  6/2016; 6/8/17    neg    HX GYN      HX LEEP PROCEDURE  6/08    neg    HX ORTHOPAEDIC Left 2012    Left bunionectomy.  HX ORTHOPAEDIC Right     Right bunionectomy.       Social History     Tobacco Use    Smoking status: Never Smoker    Smokeless tobacco: Never Used   Substance Use Topics    Alcohol use: Yes     Comment: occasionally      Family History   Problem Relation Age of Onset    Diabetes Mother  Hypertension Mother     MS Mother     Cancer Mother         breast    Bipolar Disorder Father     No Known Problems Sister     Breast Cancer Maternal Grandmother      Current Outpatient Medications   Medication Sig    dimethyl fumarate (TECFIDERA) 240 mg cpDR Take 1 Cap by mouth two (2) times a day.  vortioxetine (TRINTELLIX) 20 mg tablet Take 1 Tab by mouth daily.  benztropine (COGENTIN) 0.5 mg tablet TK 1 T PO BID    ARIPiprazole (ABILIFY) 10 mg tablet TK 1 T PO Daily    ferrous sulfate 325 mg (65 mg iron) tablet Take 1 Tab by mouth Daily (before breakfast). No current facility-administered medications for this visit. Allergies   Allergen Reactions    Amoxicillin Hives    Penicillins Hives        Review of Systems: A complete review of systems was obtained, negative except as described above. Physical Exam:     Visit Vitals  /84   Pulse (!) 102   Temp 97.7 °F (36.5 °C) (Oral)   Resp 16   Ht 5' 7\" (1.702 m)   Wt 271 lb (122.9 kg)   SpO2 98%   BMI 42.44 kg/m²     ECOG PS: 2  General: Well developed, no acute distress  Eyes: PERRLA, EOMI, anicteric sclerae  HENT: Atraumatic, OP clear, TMs intact without erythema  Neck: Supple  Lymphatic: No cervical, supraclavicular, axillary or inguinal adenopathy  Respiratory: CTAB, normal respiratory effort  CV: Normal rate, regular rhythm, no murmurs, no peripheral edema  GI: Soft, nontender, nondistended, no masses, no hepatomegaly, no splenomegaly  MS: Normal gait walking with a cane. Digits without clubbing or cyanosis. Skin: No rashes, ecchymoses, or petechiae. Normal temperature, turgor, and texture. Neuro/Psych: Alert, oriented. 5/5 strength in all 4 extremities. Appropriate affect, normal judgment/insight. Results:     Lab Results   Component Value Date/Time    WBC 8.3 07/11/2019 02:56 PM    HGB 10.6 (L) 07/11/2019 02:56 PM    HCT 34.6 07/11/2019 02:56 PM    PLATELET 028 85/63/6287 02:56 PM    MCV 79 07/11/2019 02:56 PM    ABS. NEUTROPHILS 4.9 07/11/2019 02:56 PM     Lab Results   Component Value Date/Time    Sodium 139 03/14/2019 02:35 PM    Potassium 4.4 03/14/2019 02:35 PM    Chloride 103 03/14/2019 02:35 PM    CO2 22 03/14/2019 02:35 PM    Glucose 92 03/14/2019 02:35 PM    BUN 8 03/14/2019 02:35 PM    Creatinine 0.56 (L) 03/14/2019 02:35 PM    GFR est  03/14/2019 02:35 PM    GFR est non- 03/14/2019 02:35 PM    Calcium 9.4 03/14/2019 02:35 PM     Lab Results   Component Value Date/Time    Bilirubin, total 0.2 03/14/2019 02:35 PM    ALT (SGPT) 46 (H) 03/14/2019 02:35 PM    AST (SGOT) 36 03/14/2019 02:35 PM    Alk. phosphatase 83 03/14/2019 02:35 PM    Protein, total 7.8 03/14/2019 02:35 PM    Albumin 4.7 03/14/2019 02:35 PM    Globulin 4.1 (H) 04/06/2017 01:06 PM     Lab Results   Component Value Date/Time    Iron 83 10/30/2018 02:03 PM    TIBC 399 10/30/2018 02:03 PM    Iron % saturation 21 10/30/2018 02:03 PM    Ferritin 34 10/30/2018 02:03 PM       No results found for: B12LT, FOL, RBCF  Lab Results   Component Value Date/Time    TSH 1.320 06/20/2019 03:20 PM     No results found for: HAMAT, HAAB, HABT, HAAT, HBSAG, HBSB, HBSAT, HBABN, HBCM, HBCAB, HBCAT, XBCABS, HBEAB, HBEAG, XHEPCS, [id number], [address] [facility], [address] [place] [address], [address] [place] [address], [id number], [id number], [address] [place] [address], [id number], HBCMLT, [id number], HCGAT      Imaging:     Radiology report(s) reviewed. Procedures:     9/25/18 Capsule endoscopy: Negative for any evidence of bleeding. Assessment & Plan:   Malia Cash is a 41 y.o. female comes in for evaluation and management of iron deficiency anemia. 1. Iron deficiency anemia: 2/2 menorrhagia and started worsening when pt stopped taking oral iron. In the past, her anemia resolved to Hgb > 12 with oral iron supplementation. She admits she has not been taking the iron pill regularly and had just recently restarted them. I recommend taking ferrous sulfate 325mg bid with meals and stool softeners.  If no improvement in 4 weeks, will recommend parenteral iron. Normal colonoscopy in 8/2018 by Dr. Miranda Currie. EGD, capsule endoscopy approx 8/2018 were unrevealing. -- Restart ferrous sulfate 325mg bid with meals and stool softeners   -- Recheck CBC and iron profile in 4 weeks - will call patient with results  -- Return in 3 months with repeat labs    2. Thrombocytosis: Likely reactive 2/2 iron deficiency and should resolve with iron repletion. -- Continue to monitor. 3. Multiple sclerosis: On Tecfidera, followed by Dr. Savannah Carver. 4. Menorrhagia: Pt states she was taken off of OCPs due to HTN. I appreciate the opportunity to participate in [address] [facility].     Signed By: Dejah Cuba MD     July 23, 2019

## 2019-07-23 ENCOUNTER — OFFICE VISIT (OUTPATIENT)
Dept: ONCOLOGY | Age: 39
End: 2019-07-23

## 2019-07-23 VITALS
HEART RATE: 102 BPM | HEIGHT: 67 IN | TEMPERATURE: 97.7 F | OXYGEN SATURATION: 98 % | RESPIRATION RATE: 16 BRPM | WEIGHT: 271 LBS | DIASTOLIC BLOOD PRESSURE: 84 MMHG | SYSTOLIC BLOOD PRESSURE: 123 MMHG | BODY MASS INDEX: 42.53 KG/M2

## 2019-07-23 DIAGNOSIS — G35 MS (MULTIPLE SCLEROSIS) (HCC): ICD-10-CM

## 2019-07-23 DIAGNOSIS — D75.839 THROMBOCYTOSIS: ICD-10-CM

## 2019-07-23 DIAGNOSIS — N92.0 MENORRHAGIA WITH REGULAR CYCLE: ICD-10-CM

## 2019-07-23 DIAGNOSIS — D50.0 IRON DEFICIENCY ANEMIA DUE TO CHRONIC BLOOD LOSS: Primary | ICD-10-CM

## 2019-07-23 RX ORDER — AMOXICILLIN 250 MG
1 CAPSULE ORAL
Qty: 60 TAB | Refills: 3 | Status: SHIPPED | OUTPATIENT
Start: 2019-07-23 | End: 2022-02-24

## 2019-07-23 NOTE — PATIENT INSTRUCTIONS
I would like you to start taking your iron pills twice a day with food. Also start taking stool softeners (Docusate/Senna combination pill) 1-2 times a day for constipation. I sent this to your pharmacy. Please go to the lab in 4 weeks, approx 8/24/19. I will call you with those results. Please go to the lab again in 3 months, approx 10/22/19, and return to see me approx 10/25/19.

## 2019-07-23 NOTE — TELEPHONE ENCOUNTER
Order placed for PT per VO Dr. Marissa Tomas.   Faxed to PT at [facility]

## 2019-07-23 NOTE — TELEPHONE ENCOUNTER
----- Message from Triny Dove LPN sent at 7/36/8916  7:47 AM EDT -----  Regarding: FW: Non-Urgent Medical Question  Contact: [phone]      ----- Message -----  From: Nolberto Martinez  Sent: 7/22/2019   7:35 PM EDT  To: Ale Grullon  Subject: RE: Non-Urgent Medical Question                  [facility]   Thank you  ----- Message -----  From: Ronald Fernandez RN  Sent: 7/22/19, 4:45 PM  To: Nolberto Martinez  Subject: RE: Non-Urgent Medical Question    I will place an order for you, let me know which physical therapy company to fax it to    Ronald Fernandez RN      ----- Message -----     From: Nolberto Martinez     Sent: 7/22/2019  4:37 PM EDT       To: Otoniel Garvey MD  Subject: Non-Urgent Medical Question    Hello i would like to go back to physical therapy.  Thanks

## 2019-07-25 ENCOUNTER — HOSPITAL ENCOUNTER (OUTPATIENT)
Dept: PHYSICAL THERAPY | Age: 39
Discharge: HOME | End: 2019-07-25
Payer: MEDICAID

## 2019-07-25 PROCEDURE — 97112 NEUROMUSCULAR REEDUCATION: CPT | Performed by: PHYSICAL THERAPIST

## 2019-07-25 PROCEDURE — 97162 PT EVAL MOD COMPLEX 30 MIN: CPT | Performed by: PHYSICAL THERAPIST

## 2019-07-25 PROCEDURE — 97110 THERAPEUTIC EXERCISES: CPT | Performed by: PHYSICAL THERAPIST

## 2019-07-25 NOTE — PROGRESS NOTES
PT INITIAL EVALUATION NOTE 2-15    Patient Name: Annemarie Moser  Date:2019  : 1980  [x]  Patient  Verified  Payor: [facility] MEDICAID / Plan: [address] / Product Type: Managed Care Medicaid /    In time:3:05 PM  Out time:3:50 PM  Total Treatment Time (min): 45  Visit #: 1     Treatment Area: Other abnormalities of gait and mobility [R26.89]    SUBJECTIVE  Pain Level (0-10 scale): 0/10  At worst 10/10, pain increased with standing and sitting too long, \"in one minute my back and knees are hurting  At best 0/10, pain is decreased with resting  Any medication changes, allergies to medications, adverse drug reactions, diagnosis change, or new procedure performed?: [] No    [x] Yes (see summary sheet for update)  Subjective:     \"Monday I couldn't walk at all, \"I was leaving a conference at the [employer] [facility] and couldn't make it to my car. My legs were really weak and they were shaking. I felt like they were going to give out. This was the first time that happened. \"  \"It could have been the heat\"  Pt was in physical therapy form April to May 2019. Pt has trouble negotiating stairs without a banister. My knees give out on me. Pt was diagnosed in . Pt reports no falls in the past.  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    Mechanism of Injury: Insidious onset  Previous Treatment/Compliance: Yes, excellent compliance  PMHx/Surgical Hx: MS, depression, Gall Bladder removed 2 years ago, Bunionectomy 4 years ago, obesity  Work Hx: volunteer  Living Situation: Pt lives with her dad, stairs to get in the home  Pt Goals: \"strengthen my legs\"  Barriers: Presence of MS  Motivation: Good  Substance use: None  Cognition: A & O x 3        OBJECTIVE/EXAMINATION    Posture: Knee hyper extension, increased lumbar lordosis, forward head posture in stance  Gait: WBOS, toe out, decreased heel strike, step length and trunk rotation                             UPPER QUARTER   MUSCLE STRENGTH  KEY       R  L  0 - No Contraction  C1, C2 Neck Flex 4    1 - Trace   C3 Side Flex  4  4    2 - Poor   C4 Sh Elev  4  4    3 - Fair    C5 Deltoid/Biceps 4  4    4 - Good   C6 Wrist Ext  4  4    5 - Normal   C7 Triceps  4-  4-       C8 Thumb Ext  4-  4-        T1 Hand Inst  4-  4-        LOWER QUARTER   MUSCLE STRENGTH  KEY       R  L  0 - No Contraction  L1, L2 Psoas  4  4    1 - Trace   L3 Quads  4  4   2 - Poor   L4 Tib Ant  4-  4-   3 - Fair    L5 EHL  4-  4    4 - Good   S1 FHL  4-  4-    5 - Normal   S2 Hams  3+  3+          Sensation: WNL      Balance/ Equilibrium:         Standing Balance:    Static: WBOS 30 seconds EC   Rhomber seconds EO, Unable EC   Sharpened Rhomberg: Unable EO    Functional Mobility      Bed Mobility:  Independent          Transfers:       Sit-Stand: Pt able to perform without UE assist    Optional Tests:      TUG 16 seconds, BUE to t/f sit to front stand, no AD, WBOS, toe out gat pattern, decreased heel strike and knee flex/ ext    10 min Therapeutic Exercise:  [x] See flow sheet :   Rationale: increase ROM, increase strength and improve coordination to improve the patients ability to sit, stand, transfer, ambulate, lift, carry, reach, complete ADLs    10 min Neuromuscular Re-education:  [x]  See flow sheet :   Rationale: improve coordination, improve balance and increase proprioception  to improve the patients ability to sit, stand, transfer, ambulate, lift, carry, reach, complete ADLs        With   [x] TE   [] TA   [x] neuro   [] other: Patient Education: [x] Review HEP    [] Progressed/Changed HEP based on:   [x] positioning   [x] body mechanics   [] transfers   [x] heat/ice application    [] other:        Other Objective/Functional Measures: Unable to perform SKTC due to mobility    Pain Level (0-10 scale) post treatment: 0      ASSESSMENT:      [x]  See Plan of [address] [place] [address], PT 7/25/2019

## 2019-07-25 NOTE — PROGRESS NOTES
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 Medications: Verified on Patient Summary List    Start of Care: 20      Onset Date: [address] and following information is based on the information from the initial evaluation. Assessment/ key information: The patient presents with pain at multiple sites, weakness, gait abnormalities, postural dysfunction and ROM limitations affecting her ability to walk, stand, transfer, perform household chores and complete ADLs. The patient scored a 13% on the ABC test, indicating low confidence in her balance. The patient would benefit from outpatient PT to improve safety in her home and the community. Evaluation Complexity History HIGH Complexity :3+ comorbidities / personal factors will impact the outcome/ POC ; Examination HIGH Complexity : 4+ Standardized tests and measures addressing body structure, function, activity limitation and / or participation in recreation  ;Presentation MEDIUM Complexity : Evolving with changing characteristics  ; Clinical Decision Making MEDIUM Complexity : FOTO score of 26-74  Overall Complexity Rating: MEDIUM    Problem List: pain affecting function, decrease ROM, decrease strength, edema affecting function, impaired gait/ balance, decrease ADL/ functional abilitiies, decrease activity tolerance, decrease flexibility/ joint mobility and decrease transfer abilities   Treatment Plan may include any combination of the following: Therapeutic exercise, Therapeutic activities, Neuromuscular re-education, Physical agent/modality, Gait/balance training, Manual therapy, Patient education, Self Care training, Functional mobility training, Home safety training and Stair training  Patient / Family readiness to learn indicated by: asking questions, trying to perform skills and interest  Persons(s) to be included in education: patient (P)  Barriers to Learning/Limitations: None  Patient Goal (s): MS, depression, Gall Bladder removed 2 years ago, Bunionectomy 4 years ago, obesity  Patient Self Reported Health Status: fair  Rehabilitation Potential: good    Short Term Goals: To be accomplished in 4 weeks:  1) The patient will be independent with introductory HEP  2) The patient will demonstrate negative Romberg to indicate decreased risk of falls  3) The patient will demonstrate ability to complete the TUG in12 seconds or less to indicate improved functional mobility  Long Term Goals: To be accomplished in 12 weeks:  1) The patient will transfer sit to/from stand without UE assist to improve mobility in the home  2) The patient will ambulate community distances with LRAD, on even and uneven surfaces without fear of falling  3) The patient will improve ABC score to 50% or greater to indicate improved confidence with her balance  Frequency / Duration: Patient to be seen 1 times per week for 12 weeks. Patient/ Caregiver education and instruction: self care, activity modification and exercises    [x]  Plan of care has been reviewed with NETTIE Cazares, PT 7/25/2019     ________________________________________________________________________    I certify that the above Therapy Services are being furnished while the patient is under my care. I agree with the treatment plan and certify that this therapy is necessary.     [address] Signature:____________________  Date:____________Time: _________

## 2019-07-30 ENCOUNTER — OFFICE VISIT (OUTPATIENT)
Dept: OBGYN CLINIC | Age: 39
End: 2019-07-30

## 2019-07-30 VITALS
DIASTOLIC BLOOD PRESSURE: 84 MMHG | HEIGHT: 67 IN | SYSTOLIC BLOOD PRESSURE: 123 MMHG | BODY MASS INDEX: 42.53 KG/M2 | WEIGHT: 271 LBS

## 2019-07-30 DIAGNOSIS — D21.9 FIBROIDS: Primary | ICD-10-CM

## 2019-07-30 NOTE — PATIENT INSTRUCTIONS
Uterine Fibroids: Care Instructions  Your Care Instructions    Uterine fibroids are growths in the uterus. Fibroids aren't cancer. Doctors don't know what causes fibroids. Fibroids are very common in women during their childbearing years. Fibroids can grow on the inside of the uterus, in the muscle wall of the uterus, or near the outside wall of the uterus. In some women, fibroids cause painful cramps and heavy periods. In these cases, taking anti-inflammatory medicines, birth control pills, or using an intrauterine device (IUD) often helps decrease symptoms. Sometimes surgery is needed to treat fibroids. But if you are near menopause, you may want to wait and see if your symptoms get better. Most fibroids shrink and go away after menopause, when your menstrual periods stop completely. Follow-up care is a key part of your treatment and safety. Be sure to make and go to all appointments, and call your doctor if you are having problems. It's also a good idea to know your test results and keep a list of the medicines you take. How can you care for yourself at home? · If your doctor gave you medicine, take it as exactly as prescribed. Be safe with medicines. Call your doctor if you think you are having a problem with your medicine. · Take anti-inflammatory medicines for pain. These include ibuprofen (Advil, Motrin) and naproxen (Aleve). Read and follow all instructions on the label. · Use heat, such as a hot water bottle or a heating pad set on low, or a warm bath to relax tense muscles and relieve cramping. Put a thin cloth between the heating pad and your skin. Never go to sleep with a heating pad on. · Lie down and put a pillow under your knees. Or, lie on your side and bring your knees up to your chest. These positions may help relieve belly pain or pressure. · Keep track of how many sanitary pads or tampons you use each day. · Get at least 30 minutes of exercise on most days of the week.  Walking is a good choice. You also may want to do other activities, such as running, swimming, cycling, or playing tennis or team sports. · If you bleed longer than usual or have heavy bleeding, take a daily multivitamin with iron. When should you call for help? Call your doctor now or seek immediate medical care if:    · You have severe vaginal bleeding.     · You have new or worse belly or pelvic pain.    Watch closely for changes in your health, and be sure to contact your doctor if:    · You have unusual vaginal bleeding.     · You do not get better as expected. Where can you learn more? Go to http://luis daniel-rashawn.info/. Enter B121 in the search box to learn more about \"Uterine Fibroids: Care Instructions. \"  Current as of: February 19, 2019  Content Version: 12.1  © 9399-0776 [employer]. Care instructions adapted under license by [employer] (which disclaims liability or warranty for this information). If you have questions about a medical condition or this instruction, always ask your healthcare professional. [address] any warranty or liability for your use of this information.

## 2019-07-30 NOTE — PROGRESS NOTES
Ultrasound followup    Jen Lyons is a 41 y.o. female is here today to review the results of her ultrasound evaluation. Her U/S evaluation is performed because of a previous encounter revealing menorrhagia which was identified 1 month ago. She is here for an initial ultrasound study. The sonogram: DIFFICULT SCAN DUE TO PATIENT BODY HABITUS. TA AND TV SCANS PERFORMED FOR BEST VISUALIZATION. UTERUS IS ANTEVERTED, ENLARGED SIZE AND HETEROGENOUS IN ECHOGENECITY. MULTIPLE FIBROIDS ARE SEEN. THE TWO MOST PROMINANT FIBROIDS ARE MEASURED. FIBROID 1, ANTERIOR FUNDAL  FIBROID 2, LEFT LATERAL  ENDOMETRIUM APPEARS TO MEASURE 7-8MM IN THICKNESS. THE BORDERS ARE DIFFICULT TO  VISUALIZE DUE TO THE FIBROIDS. RIGHT OVARY APPEARS WITHIN NORMAL LIMITS. LEFT OVARY APPEARS WITHIN NORMAL LIMITS. NO FREE FLUID SEEN IN THE CDS. .    See detailed report for more information.    Visit Vitals  /84   Ht 5' 7\" (1.702 m)   Wt 271 lb (122.9 kg)   BMI 42.44 kg/m²       A: fibroids  BP improved off OC    P: observe for now - not sx    15 minutes was spent face to face with patient and >50% was spent counseling

## 2019-08-01 ENCOUNTER — HOSPITAL ENCOUNTER (OUTPATIENT)
Dept: PHYSICAL THERAPY | Age: 39
Discharge: HOME | End: 2019-08-01
Payer: MEDICAID

## 2019-08-01 PROCEDURE — 97112 NEUROMUSCULAR REEDUCATION: CPT

## 2019-08-01 PROCEDURE — 97110 THERAPEUTIC EXERCISES: CPT

## 2019-08-01 NOTE — PROGRESS NOTES
PT DAILY TREATMENT NOTE 2-15    Patient Name: Balbina Rizzo  Date:2019  : 1980  [x]  Patient  Verified  Payor: [facility] MEDICAID / Plan: [address] [place] / Product Type: Managed Care Medicaid /    In time:4:55p  Out time:5:55p  Total Treatment Time (min): 60  Visit #:  2    Treatment Area: Other abnormalities of gait and mobility [R26.89]    SUBJECTIVE  Pain Level (0-10 scale): 0  Any medication changes, allergies to medications, adverse drug reactions, diagnosis change, or new procedure performed?: [x] No    [] Yes (see summary sheet for update)  Subjective functional status/changes:   [] No changes reported  Patient reports compliance with HEP. Patient reports she fell on Tuesday when she was leaving a MD appointment and feels like her sandal bent and caused the fall.       OBJECTIVE      30 min Therapeutic Exercise:  [x] See flow sheet :   Rationale: increase ROM and increase strength to improve the patients ability to sit, stand, ambulate and complete ADL's    30 min Neuromuscular Re-education:  [x] See flow sheet :   Rationale: increase ROM, increase strength, improve coordination, improve balance and increase proprioception  to improve the patients ability to  sit, stand, ambulate and complete ADL's    With   [] TE   [] TA   [] neuro   [] other: Patient Education: [x] Review HEP    [] Progressed/Changed HEP based on:   [] positioning   [] body mechanics   [] transfers   [] heat/ice application    [] other:      Other Objective/Functional Measures: pt with increased R hip pain with LTR, after rest break and stretches pt able to tolerate LTR with no pain, pt with max difficulty with hip adduction/ball squeeze due to limit ROM and muscle activation     Pain Level (0-10 scale) post treatment: 0    ASSESSMENT/Changes in Function:     Patient will continue to benefit from skilled PT services to modify and progress therapeutic interventions, address functional mobility deficits, address ROM deficits, address strength deficits, analyze and address soft tissue restrictions, analyze and cue movement patterns, analyze and modify body mechanics/ergonomics and assess and modify postural abnormalities to attain remaining goals. []  See Plan of Care  []  See progress note/recertification  []  See Discharge Summary         Progress towards goals / Updated goals:  Patient did well with advanced exercises and will do well with continued progression.      PLAN  [x]  Upgrade activities as tolerated     [x]  Continue plan of care  []  Update interventions per flow sheet       []  Discharge due to:_  []  Other:_      Jami Sanchez 8/1/2019

## 2019-08-06 ENCOUNTER — HOSPITAL ENCOUNTER (OUTPATIENT)
Dept: PHYSICAL THERAPY | Age: 39
Discharge: HOME | End: 2019-08-06
Payer: MEDICAID

## 2019-08-06 PROCEDURE — 97110 THERAPEUTIC EXERCISES: CPT

## 2019-08-06 PROCEDURE — 97112 NEUROMUSCULAR REEDUCATION: CPT

## 2019-08-06 NOTE — PROGRESS NOTES
PT DAILY TREATMENT NOTE 2-15    Patient Name: Nolberto Martinez  Date:2019  : 1980  [x]  Patient  Verified  Payor: [facility] MEDICAID / Plan: [address] / Product Type: Managed Care Medicaid /    In time:2:00p  Out time: 3:00p  Total Treatment Time (min): 60  Visit #:  3    Treatment Area: Other abnormalities of gait and mobility [R26.89]    SUBJECTIVE  Pain Level (0-10 scale): 0  Any medication changes, allergies to medications, adverse drug reactions, diagnosis change, or new procedure performed?: [x] No    [] Yes (see summary sheet for update)  Subjective functional status/changes:   [] No changes reported  Patient reports no issues after last session. Patient states her back was bothering her after she got out of the shower this morning.       OBJECTIVE      30 min Therapeutic Exercise:  [x] See flow sheet :   Rationale: increase ROM and increase strength to improve the patients ability to sit, stand, ambulate and complete ADL's    30 min Neuromuscular Re-education:  [x] See flow sheet :   Rationale: increase ROM, increase strength, improve coordination, improve balance and increase proprioception  to improve the patients ability to  sit, stand, ambulate and complete ADL's    With   [] TE   [] TA   [] neuro   [] other: Patient Education: [x] Review HEP    [] Progressed/Changed HEP based on:   [] positioning   [] body mechanics   [] transfers   [] heat/ice application    [] other:      Other Objective/Functional Measures: pt with no R hip pain with LTR, pt with max difficulty with hip adduction/ball squeeze due to limit ROM and poor muscle activation     Pain Level (0-10 scale) post treatment: 0    ASSESSMENT/Changes in Function:     Patient will continue to benefit from skilled PT services to modify and progress therapeutic interventions, address functional mobility deficits, address ROM deficits, address strength deficits, analyze and address soft tissue restrictions, analyze and cue movement patterns, analyze and modify body mechanics/ergonomics and assess and modify postural abnormalities to attain remaining goals. []  See Plan of Care  []  See progress note/recertification  []  See Discharge Summary         Progress towards goals / Updated goals:  Patient did well with advanced exercises and will do well with continued progression.      PLAN  [x]  Upgrade activities as tolerated     [x]  Continue plan of care  []  Update interventions per flow sheet       []  Discharge due to:_  []  Other:_      Meenakshi Face 8/6/2019

## 2019-08-13 ENCOUNTER — HOSPITAL ENCOUNTER (OUTPATIENT)
Dept: PHYSICAL THERAPY | Age: 39
Discharge: HOME | End: 2019-08-13
Payer: MEDICAID

## 2019-08-13 PROCEDURE — 97110 THERAPEUTIC EXERCISES: CPT

## 2019-08-13 PROCEDURE — 97112 NEUROMUSCULAR REEDUCATION: CPT

## 2019-08-13 NOTE — PROGRESS NOTES
PT DAILY TREATMENT NOTE 2-15    Patient Name: Rani Santoyo  Date:2019  : 1980  [x]  Patient  Verified  Payor: [facility] MEDICAID / Plan: [address] / Product Type: Managed Care Medicaid /    In time:2:30p  Out time: 3:30p  Total Treatment Time (min): 60  Visit #:  4    Treatment Area: Other abnormalities of gait and mobility [R26.89]    SUBJECTIVE  Pain Level (0-10 scale): 0  Any medication changes, allergies to medications, adverse drug reactions, diagnosis change, or new procedure performed?: [x] No    [] Yes (see summary sheet for update)  Subjective functional status/changes:   [] No changes reported  Patient reports no issues after last session. Patient states her back was bothering her after she got out of the shower this morning.       OBJECTIVE      30 min Therapeutic Exercise:  [x] See flow sheet :   Rationale: increase ROM and increase strength to improve the patients ability to sit, stand, ambulate and complete ADL's    30 min Neuromuscular Re-education:  [x] See flow sheet :   Rationale: increase ROM, increase strength, improve coordination, improve balance and increase proprioception  to improve the patients ability to  sit, stand, ambulate and complete ADL's    With   [] TE   [] TA   [] neuro   [] other: Patient Education: [x] Review HEP    [] Progressed/Changed HEP based on:   [] positioning   [] body mechanics   [] transfers   [] heat/ice application    [] other:      Other Objective/Functional Measures: pt with max difficulty with hip adduction/ball squeeze due to limit ROM and poor muscle activation     Unable to advance EC stance at this time due to several LOB    Pain Level (0-10 scale) post treatment: 0    ASSESSMENT/Changes in Function:     Patient will continue to benefit from skilled PT services to modify and progress therapeutic interventions, address functional mobility deficits, address ROM deficits, address strength deficits, analyze and address soft tissue restrictions, analyze and cue movement patterns, analyze and modify body mechanics/ergonomics and assess and modify postural abnormalities to attain remaining goals. []  See Plan of Care  []  See progress note/recertification  []  See Discharge Summary         Progress towards goals / Updated goals:  Patient did well with advanced exercises and will do well with continued progression.      PLAN  [x]  Upgrade activities as tolerated     [x]  Continue plan of care  []  Update interventions per flow sheet       []  Discharge due to:_  []  Other:_      Meenakshi Face 8/13/2019

## 2019-08-16 LAB
BASOPHILS # BLD AUTO: 0.1 X10E3/UL (ref 0–0.2)
BASOPHILS NFR BLD AUTO: 1 %
EOSINOPHIL # BLD AUTO: 0.1 X10E3/UL (ref 0–0.4)
EOSINOPHIL NFR BLD AUTO: 2 %
ERYTHROCYTE [DISTWIDTH] IN BLOOD BY AUTOMATED COUNT: 19.3 % (ref 12.3–15.4)
FERRITIN SERPL-MCNC: 38 NG/ML (ref 15–150)
HCT VFR BLD AUTO: 36 % (ref 34–46.6)
HGB BLD-MCNC: 11.5 G/DL (ref 11.1–15.9)
IMM GRANULOCYTES # BLD AUTO: 0 X10E3/UL (ref 0–0.1)
IMM GRANULOCYTES NFR BLD AUTO: 0 %
IRON SATN MFR SERPL: 41 % (ref 15–55)
IRON SERPL-MCNC: 138 UG/DL (ref 27–159)
LYMPHOCYTES # BLD AUTO: 1.6 X10E3/UL (ref 0.7–3.1)
LYMPHOCYTES NFR BLD AUTO: 36 %
MCH RBC QN AUTO: 26 PG (ref 26.6–33)
MCHC RBC AUTO-ENTMCNC: 31.9 G/DL (ref 31.5–35.7)
MCV RBC AUTO: 81 FL (ref 79–97)
MONOCYTES # BLD AUTO: 0.3 X10E3/UL (ref 0.1–0.9)
MONOCYTES NFR BLD AUTO: 7 %
NEUTROPHILS # BLD AUTO: 2.5 X10E3/UL (ref 1.4–7)
NEUTROPHILS NFR BLD AUTO: 54 %
PLATELET # BLD AUTO: 345 X10E3/UL (ref 150–450)
RBC # BLD AUTO: 4.43 X10E6/UL (ref 3.77–5.28)
TIBC SERPL-MCNC: 340 UG/DL (ref 250–450)
UIBC SERPL-MCNC: 202 UG/DL (ref 131–425)
WBC # BLD AUTO: 4.6 X10E3/UL (ref 3.4–10.8)

## 2019-08-16 NOTE — PROGRESS NOTES
Received return call from patient. Verified ID x 2. Advised of results & recommendation per provider note. Verbalized understanding & appreciation.

## 2019-08-16 NOTE — PROGRESS NOTES
Let patient know her hgb has improved, from 10.6 to 11.5 and her iron profile/ferritin are normal. Advise her to continue taking the iron supplements BID and we will see her back in clinic (at scheduled appt date:) thanks !

## 2019-08-19 DIAGNOSIS — D50.0 IRON DEFICIENCY ANEMIA DUE TO CHRONIC BLOOD LOSS: ICD-10-CM

## 2019-08-20 ENCOUNTER — HOSPITAL ENCOUNTER (OUTPATIENT)
Dept: PHYSICAL THERAPY | Age: 39
Discharge: HOME | End: 2019-08-20
Payer: MEDICAID

## 2019-08-20 PROCEDURE — 97110 THERAPEUTIC EXERCISES: CPT | Performed by: PHYSICAL THERAPIST

## 2019-08-20 PROCEDURE — 97112 NEUROMUSCULAR REEDUCATION: CPT | Performed by: PHYSICAL THERAPIST

## 2019-08-20 NOTE — PROGRESS NOTES
PT DAILY TREATMENT NOTE 2-15    Patient Name: Tiffany Dawn  Date:2019  : 1980  [x]  Patient  Verified  Payor: [facility] MEDICAID / Plan: [address] [place] / Product Type: Managed Care Medicaid /    In time:3:00 p  Out time: 4:00 p  Total Treatment Time (min): 60  Visit #:  5    Treatment Area:  Other abnormalities of gait and mobility [R26.89]    SUBJECTIVE  Pain Level (0-10 scale): 0  Any medication changes, allergies to medications, adverse drug reactions, diagnosis change, or new procedure performed?: [x] No    [] Yes (see summary sheet for update)  Subjective functional status/changes:   [] No changes reported  Patient reports she is getting stronger and her balance is better    OBJECTIVE      30 min Therapeutic Exercise:  [x] See flow sheet :   Rationale: increase ROM and increase strength to improve the patients ability to sit, stand, ambulate and complete ADL's    30 min Neuromuscular Re-education:  [x] See flow sheet :   Rationale: increase ROM, increase strength, improve coordination, improve balance and increase proprioception  to improve the patients ability to  sit, stand, ambulate and complete ADL's    With   [] TE   [] TA   [] neuro   [] other: Patient Education: [x] Review HEP    [] Progressed/Changed HEP based on:   [] positioning   [] body mechanics   [] transfers   [] heat/ice application    [] other:      Other Objective/Functional Measures:    Romberg: Positive for LOB with EC    Unable to perform rockerboard A/p without LOB/ UE assist    Pain Level (0-10 scale) post treatment: 0    ASSESSMENT/Changes in Function:     Patient will continue to benefit from skilled PT services to modify and progress therapeutic interventions, address functional mobility deficits, address ROM deficits, address strength deficits, analyze and address soft tissue restrictions, analyze and cue movement patterns, analyze and modify body mechanics/ergonomics and assess and modify postural abnormalities to attain remaining goals. []  See Plan of Care  []  See progress note/recertification  []  See Discharge Summary         Progress towards goals / Updated goals:  Pt required 2 seated rest breaks during standing intervention. Excellent exercise tolerance.     PLAN  [x]  Upgrade activities as tolerated     [x]  Continue plan of care  []  Update interventions per flow sheet       []  Discharge due to:_  []  Other:_      Horacio Martinez, PT 8/20/2019

## 2019-08-22 ENCOUNTER — OFFICE VISIT (OUTPATIENT)
Dept: FAMILY MEDICINE CLINIC | Age: 39
End: 2019-08-22

## 2019-08-22 VITALS
DIASTOLIC BLOOD PRESSURE: 78 MMHG | WEIGHT: 270 LBS | OXYGEN SATURATION: 98 % | TEMPERATURE: 97.5 F | SYSTOLIC BLOOD PRESSURE: 132 MMHG | HEART RATE: 102 BPM | BODY MASS INDEX: 42.38 KG/M2 | RESPIRATION RATE: 18 BRPM | HEIGHT: 67 IN

## 2019-08-22 DIAGNOSIS — R03.0 ELEVATED BP WITHOUT DIAGNOSIS OF HYPERTENSION: Primary | ICD-10-CM

## 2019-08-22 NOTE — PROGRESS NOTES
Chief Complaint   Patient presents with    Blood Pressure Check     sob 6 wk f/u     1. Have you been to the ER, urgent care clinic since your last visit? Hospitalized since your last visit? No     2. Have you seen or consulted any other health care providers outside of the [address] [place] [address] since your last visit? Include any pap smears or colon screening.  No

## 2019-08-22 NOTE — PROGRESS NOTES
HISTORY OF PRESENT ILLNESS  Bri Santa is a 41 y.o. female. Rani Santoyo is here to follow up on their elevated BP. This is a chronic problem. The current episode started more than 1 week ago. The problem occurs constantly and is stable. She brings in her home blood pressures and they have been /76-88. Pertinent negatives include no chest pain, no abdominal pain, no headaches and no shortness of breath. Nothing aggravates the symptoms. The symptoms are relieved by nothing. Also she needs to follow up on her shortness of breath. This has resolved. Her hemoglobin was 10.6 and she has followed up with hematology. They have her taking BID iron and she has been compliant. Her last hgb was normal.      Review of Systems   Constitutional: Negative for weight loss. No weight gain   Eyes: Negative for blurred vision. Respiratory: Negative for shortness of breath. Cardiovascular: Negative for chest pain and leg swelling. Gastrointestinal: Negative for abdominal pain. Neurological: Negative for dizziness, sensory change, speech change, focal weakness and headaches. Visit Vitals  /78   Pulse (!) 102   Temp 97.5 °F (36.4 °C) (Oral)   Resp 18   Ht 5' 7\" (1.702 m)   Wt 270 lb (122.5 kg)   SpO2 98%   BMI 42.29 kg/m²     BP Readings from Last 3 Encounters:   08/22/19 132/78   07/30/19 123/84   07/23/19 123/84     Physical Exam   Constitutional: She is oriented to person, place, and time. She appears well-developed and well-nourished. No distress. Cardiovascular: Normal rate, regular rhythm and normal heart sounds. Exam reveals no gallop and no friction rub. No murmur heard. Pulmonary/Chest: Effort normal and breath sounds normal. No respiratory distress. She has no wheezes. She has no rales. Musculoskeletal: She exhibits no edema. Neurological: She is alert and oriented to person, place, and time. Skin: Skin is warm and dry. She is not diaphoretic.    Nursing note and vitals reviewed. ASSESSMENT and PLAN    ICD-10-CM ICD-9-CM    1. Elevated BP without diagnosis of hypertension R03.0 796.2         Blood pressure doing well  Continue current plans. Follow-up and Dispositions    · Return in about 6 months (around 2/22/2020) for depression. Reviewed plan of care. Patient has provided input and agrees with goals.

## 2019-08-27 ENCOUNTER — HOSPITAL ENCOUNTER (OUTPATIENT)
Dept: PHYSICAL THERAPY | Age: 39
Discharge: HOME | End: 2019-08-27
Payer: MEDICAID

## 2019-08-27 PROCEDURE — 97112 NEUROMUSCULAR REEDUCATION: CPT

## 2019-08-27 PROCEDURE — 97110 THERAPEUTIC EXERCISES: CPT

## 2019-08-27 NOTE — PROGRESS NOTES
PT DAILY TREATMENT NOTE 2-15    Patient Name: Rani Santoyo  Date:2019  : 1980  [x]  Patient  Verified  Payor: [facility] MEDICAID / Plan: [address] / Product Type: Managed Care Medicaid /    In time:12:00p  Out time: 1:00p  Total Treatment Time (min): 60  Visit #:  6    Treatment Area: Other abnormalities of gait and mobility [R26.89]    SUBJECTIVE  Pain Level (0-10 scale): 0  Any medication changes, allergies to medications, adverse drug reactions, diagnosis change, or new procedure performed?: [x] No    [] Yes (see summary sheet for update)  Subjective functional status/changes:   [x] No changes reported      OBJECTIVE      30 min Therapeutic Exercise:  [x] See flow sheet :   Rationale: increase ROM and increase strength to improve the patients ability to sit, stand, ambulate and complete ADL's    30 min Neuromuscular Re-education:  [x] See flow sheet :   Rationale: increase ROM, increase strength, improve coordination, improve balance and increase proprioception  to improve the patients ability to  sit, stand, ambulate and complete ADL's    With   [] TE   [] TA   [] neuro   [] other: Patient Education: [x] Review HEP    [] Progressed/Changed HEP based on:   [] positioning   [] body mechanics   [] transfers   [] heat/ice application    [] other:      Other Objective/Functional Measures:    Romberg: Positive for LOB with EC    Unable to perform rockerboard A/p without LOB/ UE assist    Pain Level (0-10 scale) post treatment: 0    ASSESSMENT/Changes in Function:     Patient will continue to benefit from skilled PT services to modify and progress therapeutic interventions, address functional mobility deficits, address ROM deficits, address strength deficits, analyze and address soft tissue restrictions, analyze and cue movement patterns, analyze and modify body mechanics/ergonomics and assess and modify postural abnormalities to attain remaining goals.      []  See Plan of Care  []  See progress note/recertification  []  See Discharge Summary         Progress towards goals / Updated goals:  Patient continues to demonstrate increased difficulty with eye closed balance exercises, however is progressing well towards goals with fewer rest breaks and cues required.     PLAN  [x]  Upgrade activities as tolerated     [x]  Continue plan of care  []  Update interventions per flow sheet       []  Discharge due to:_  []  Other:_      Roxy Foy 8/27/2019

## 2019-09-03 ENCOUNTER — HOSPITAL ENCOUNTER (OUTPATIENT)
Dept: PHYSICAL THERAPY | Age: 39
Discharge: HOME | End: 2019-09-03
Payer: MEDICAID

## 2019-09-03 PROCEDURE — 97110 THERAPEUTIC EXERCISES: CPT

## 2019-09-03 PROCEDURE — 97112 NEUROMUSCULAR REEDUCATION: CPT

## 2019-09-03 NOTE — PROGRESS NOTES
PT DAILY TREATMENT NOTE 2-15    Patient Name: Shar Galaviz  Date:9/3/2019  : 1980  [x]  Patient  Verified  Payor: [facility] MEDICAID / Plan: [address] / Product Type: Managed Care Medicaid /    In time:2:30p  Out time: 3:30p  Total Treatment Time (min): 60  Visit #:  7    Treatment Area:  Other abnormalities of gait and mobility [R26.89]    SUBJECTIVE  Pain Level (0-10 scale): 0  Any medication changes, allergies to medications, adverse drug reactions, diagnosis change, or new procedure performed?: [x] No    [] Yes (see summary sheet for update)  Subjective functional status/changes:   [x] No changes reported      OBJECTIVE      30 min Therapeutic Exercise:  [x] See flow sheet :   Rationale: increase ROM and increase strength to improve the patients ability to sit, stand, ambulate and complete ADL's    30 min Neuromuscular Re-education:  [x] See flow sheet :   Rationale: increase ROM, increase strength, improve coordination, improve balance and increase proprioception  to improve the patients ability to  sit, stand, ambulate and complete ADL's    With   [] TE   [] TA   [] neuro   [] other: Patient Education: [x] Review HEP    [] Progressed/Changed HEP based on:   [] positioning   [] body mechanics   [] transfers   [] heat/ice application    [] other:      Other Objective/Functional Measures: pt able to advance eye open stance with 1 LOB bilaterally and increased strategies L heel to R bunion, unable to advance eyes closed stance     Pain Level (0-10 scale) post treatment: 0    ASSESSMENT/Changes in Function:     Patient will continue to benefit from skilled PT services to modify and progress therapeutic interventions, address functional mobility deficits, address ROM deficits, address strength deficits, analyze and address soft tissue restrictions, analyze and cue movement patterns, analyze and modify body mechanics/ergonomics and assess and modify postural abnormalities to attain remaining goals.     []  See Plan of Care  []  See progress note/recertification  []  See Discharge Summary         Progress towards goals / Updated goals: patient demonstrates improved exercise tolerance with decreased rest breaks and is able to advance several exercises with min increased fatigue. Patient making steady progress towards goals at this time.      PLAN  [x]  Upgrade activities as tolerated     [x]  Continue plan of care  []  Update interventions per flow sheet       []  Discharge due to:_  []  Other:_      Frutoso Dials 9/3/2019

## 2019-09-12 ENCOUNTER — HOSPITAL ENCOUNTER (OUTPATIENT)
Dept: PHYSICAL THERAPY | Age: 39
Discharge: HOME | End: 2019-09-12
Payer: MEDICAID

## 2019-09-12 PROCEDURE — 97112 NEUROMUSCULAR REEDUCATION: CPT | Performed by: PHYSICAL THERAPIST

## 2019-09-12 PROCEDURE — 97110 THERAPEUTIC EXERCISES: CPT | Performed by: PHYSICAL THERAPIST

## 2019-09-12 NOTE — PROGRESS NOTES
PT DAILY TREATMENT NOTE 2-15    Patient Name: Darleen Summers  Date:2019  : 1980  [x]  Patient  Verified  Payor: [facility] MEDICAID / Plan: [address] / Product Type: Managed Care Medicaid /    In time:2:30p  Out time: 3:30p  Total Treatment Time (min): 60  Visit #:  8    Treatment Area:  Other abnormalities of gait and mobility [R26.89]    SUBJECTIVE  Pain Level (0-10 scale): 0  Any medication changes, allergies to medications, adverse drug reactions, diagnosis change, or new procedure performed?: [x] No    [] Yes (see summary sheet for update)  Subjective functional status/changes:   [] No changes reported   Pt reports she is feeling good overall    OBJECTIVE      30 min Therapeutic Exercise:  [x] See flow sheet :   Rationale: increase ROM and increase strength to improve the patients ability to sit, stand, ambulate and complete ADL's    30 min Neuromuscular Re-education:  [x] See flow sheet :   Rationale: increase ROM, increase strength, improve coordination, improve balance and increase proprioception  to improve the patients ability to  sit, stand, ambulate and complete ADL's    With   [] TE   [] TA   [] neuro   [] other: Patient Education: [x] Review HEP    [] Progressed/Changed HEP based on:   [] positioning   [] body mechanics   [] transfers   [] heat/ice application    [] other:      Other Objective/Functional Measures: Poor tolerance of hip abd exercise    1-2 LOB with A/P rockerboard  No LOB with L/R rockerboard     Pain Level (0-10 scale) post treatment: 0    ASSESSMENT/Changes in Function:     Patient will continue to benefit from skilled PT services to modify and progress therapeutic interventions, address functional mobility deficits, address ROM deficits, address strength deficits, analyze and address soft tissue restrictions, analyze and cue movement patterns, analyze and modify body mechanics/ergonomics and assess and modify postural abnormalities to attain remaining goals.     []  See Plan of Care  []  See progress note/recertification  []  See Discharge Summary         Progress towards goals / Updated goals:   No pain with today's interventions. Several rest break required. Unable to advance EC balance.       PLAN  [x]  Upgrade activities as tolerated     [x]  Continue plan of care  []  Update interventions per flow sheet       []  Discharge due to:_  []  Other:_      Beatrice Mendoza, PT 9/12/2019

## 2019-09-17 ENCOUNTER — HOSPITAL ENCOUNTER (OUTPATIENT)
Dept: PHYSICAL THERAPY | Age: 39
Discharge: HOME | End: 2019-09-17
Payer: MEDICAID

## 2019-09-17 PROCEDURE — 97112 NEUROMUSCULAR REEDUCATION: CPT

## 2019-09-17 PROCEDURE — 97110 THERAPEUTIC EXERCISES: CPT

## 2019-09-17 NOTE — PROGRESS NOTES
PT DAILY TREATMENT NOTE 2-15    Patient Name: Gigi Haji  Date:2019  : 1980  [x]  Patient  Verified  Payor: [facility] MEDICAID / Plan: [address] / Product Type: Managed Care Medicaid /    In time:2:30p  Out time: 3:30p  Total Treatment Time (min): 60  Visit #:  9    Treatment Area: Other abnormalities of gait and mobility [R26.89]    SUBJECTIVE  Pain Level (0-10 scale): 0  Any medication changes, allergies to medications, adverse drug reactions, diagnosis change, or new procedure performed?: [x] No    [] Yes (see summary sheet for update)  Subjective functional status/changes:   [] No changes reported  Patient reports no issues since last session and feels ready for discharge.     OBJECTIVE      30 min Therapeutic Exercise:  [x] See flow sheet :   Rationale: increase ROM and increase strength to improve the patients ability to sit, stand, ambulate and complete ADL's    30 min Neuromuscular Re-education:  [x] See flow sheet :   Rationale: increase ROM, increase strength, improve coordination, improve balance and increase proprioception  to improve the patients ability to  sit, stand, ambulate and complete ADL's    With   [] TE   [] TA   [] neuro   [] other: Patient Education: [x] Review HEP    [] Progressed/Changed HEP based on:   [] positioning   [] body mechanics   [] transfers   [] heat/ice application    [] other:      Other Objective/Functional Measures:   UPPER QUARTER MUSCLE STRENGTH      R  L  C1, C2 Neck Flex  4   C3 Side Flex   4  4   C4 Sh Elev   4  4   C5 Deltoid/Biceps  4+  4+   C6 Wrist Ext   4+  4+   C7 Triceps   4-  4-   C8 Thumb Ext   4-  4-   T1 Hand Inst   4-  4-    LOWER QUARTER MUSCLE STRENGTH      R  L  L1, L2 Psoas   4  4    L3 Quads   4  4+   L4 Tib Ant   4-  4-  L5 EHL   4-  4   S1 FHL   4-  4-   S2 Hams   3+  3+       Sharpened romberg: L heel to toe- 30 seconds, R HTT-5 second  Romberg EO: 30 B, EC: 25 seconds  TU seconds, no UE assist to stand Pain Level (0-10 scale) post treatment: 0    ASSESSMENT/Changes in Function:     Patient will continue to benefit from skilled PT services to modify and progress therapeutic interventions, address functional mobility deficits, address ROM deficits, address strength deficits, analyze and address soft tissue restrictions, analyze and cue movement patterns, analyze and modify body mechanics/ergonomics and assess and modify postural abnormalities to attain remaining goals. []  See Plan of Care  []  See progress note/recertification  []  See Discharge Summary         Progress towards goals / Updated goals:   Patient has made good progress towards goals and has met 2/3 short term and 2/3 long term goals. Patient will do well with HEP to continue to promote improved balance, decreased fall risk and strength. Short Term Goals: To be accomplished in 4 weeks:  1) The patient will be independent with introductory HEP Met  2) The patient will demonstrate negative Romberg to indicate decreased risk of falls Progressing  3) The patient will demonstrate ability to complete the TUG in12 seconds or less to indicate improved functional mobility Met  Long Term Goals: To be accomplished in 12 weeks:  1) The patient will transfer sit to/from stand without UE assist to improve mobility in the home Met  2) The patient will ambulate community distances with LRAD, on even and uneven surfaces without fear of falling Met  3) The patient will improve ABC score to 50% or greater to indicate improved confidence with her balance  Frequency / Duration: Patient to be seen 1 times per week for 12 weeks.     PLAN  [x]  Upgrade activities as tolerated     [x]  Continue plan of care  []  Update interventions per flow sheet       []  Discharge due to:_  []  Other:_      Anabella Weir 9/17/2019

## 2019-09-26 ENCOUNTER — OFFICE VISIT (OUTPATIENT)
Dept: BEHAVIORAL/MENTAL HEALTH CLINIC | Age: 39
End: 2019-09-26

## 2019-09-26 VITALS
SYSTOLIC BLOOD PRESSURE: 111 MMHG | BODY MASS INDEX: 42.16 KG/M2 | HEART RATE: 89 BPM | WEIGHT: 268.6 LBS | DIASTOLIC BLOOD PRESSURE: 88 MMHG | HEIGHT: 67 IN

## 2019-09-26 DIAGNOSIS — F41.9 ANXIETY: ICD-10-CM

## 2019-09-26 DIAGNOSIS — F43.21 GRIEF: ICD-10-CM

## 2019-09-26 DIAGNOSIS — F33.2 MODERATELY SEVERE RECURRENT MAJOR DEPRESSION (HCC): Primary | ICD-10-CM

## 2019-09-26 RX ORDER — BENZTROPINE MESYLATE 0.5 MG/1
TABLET ORAL
Qty: 60 TAB | Refills: 5 | Status: SHIPPED | OUTPATIENT
Start: 2019-09-26 | End: 2020-04-17 | Stop reason: SDUPTHER

## 2019-09-26 RX ORDER — ARIPIPRAZOLE 10 MG/1
TABLET ORAL
Qty: 30 TAB | Refills: 5 | Status: SHIPPED | OUTPATIENT
Start: 2019-09-26

## 2019-09-26 NOTE — PATIENT INSTRUCTIONS
Sleep Tips    What to avoid    · Do not have drinks with caffeine, such as coffee or black tea, for 8 hours before bed. · Do not smoke or use other types of tobacco near bedtime. Nicotine is a stimulant and can keep you awake. · Avoid drinking alcohol late in the evening, because it can cause you to wake in the middle of the night. · Do not eat a big meal close to bedtime. If you are hungry, eat a light snack. · Do not drink a lot of water close to bedtime, because the need to urinate may wake you up during the night. · Do not read or watch TV in bed. Use the bed only for sleeping and sexual activity. What to try    · Go to bed at the same time every night, and wake up at the same time every morning. Do not take naps during the day. · Keep your bedroom quiet, dark, and cool. · Get regular exercise, but not within 3 to 4 hours of your bedtime. · Sleep on a comfortable pillow and mattress. · If watching the clock makes you anxious, turn it facing away from you so you cannot see the time. · If you worry when you lie down, start a worry book. Well before bedtime, write down your worries, and then set the book and your concerns aside. · Try meditation or other relaxation techniques before you go to bed. · If you cannot fall asleep, get up and go to another room until you feel sleepy. Do something relaxing. Repeat your bedtime routine before you go to bed again. Make your house quiet and calm about an hour before bedtime. Turn down the lights, turn off the TV, log off the computer, and turn down the volume on music. This can help you relax after a busy day. Recovering From Depression: Care Instructions  Your Care Instructions    Taking good care of yourself is important as you recover from depression. In time, your symptoms will fade as your treatment takes hold. Do not give up. Instead, focus your energy on getting better. Your mood will improve. It just takes some time.  Focus on things that can help you feel better, such as being with friends and family, eating well, and getting enough rest. But take things slowly. Do not do too much too soon. You will begin to feel better gradually. Follow-up care is a key part of your treatment and safety. Be sure to make and go to all appointments, and call your doctor if you are having problems. It's also a good idea to know your test results and keep a list of the medicines you take. How can you care for yourself at home? Be realistic  If you have a large task to do, break it up into smaller steps you can handle, and just do what you can. You may want to put off important decisions until your depression has lifted. If you have plans that will have a major impact on your life, such as marriage, divorce, or a job change, try to wait a bit. Talk it over with friends and loved ones who can help you look at the overall picture first.  Reaching out to people for help is important. Do not isolate yourself. Let your family and friends help you. Find someone you can trust and confide in, and talk to that person. Be patient, and be kind to yourself. Remember that depression is not your fault and is not something you can overcome with willpower alone. Treatment is necessary for depression, just like for any other illness. Feeling better takes time, and your mood will improve little by little. Stay active  Stay busy and get outside. Take a walk, or try some other light exercise. Talk with your doctor about an exercise program. Exercise can help with mild depression. Go to a movie or concert. Take part in a Quaker activity or other social gathering. Go to a ball game. Ask a friend to have dinner with you. Take care of yourself  Eat a balanced diet with plenty of fresh fruits and vegetables, whole grains, and lean protein. If you have lost your appetite, eat small snacks rather than large meals. Avoid drinking alcohol or using illegal drugs.  Do not take medicines that have not been prescribed for you. They may interfere with medicines you may be taking for depression, or they may make your depression worse. Take your medicines exactly as they are prescribed. You may start to feel better within 1 to 3 weeks of taking antidepressant medicine. But it can take as many as 6 to 8 weeks to see more improvement. If you have questions or concerns about your medicines, or if you do not notice any improvement by 3 weeks, talk to your doctor. If you have any side effects from your medicine, tell your doctor. Antidepressants can make you feel tired, dizzy, or nervous. Some people have dry mouth, constipation, headaches, sexual problems, or diarrhea. Many of these side effects are mild and will go away on their own after you have been taking the medicine for a few weeks. Some may last longer. Talk to your doctor if side effects are bothering you too much. You might be able to try a different medicine. Get enough sleep. If you have problems sleeping:  Go to bed at the same time every night, and get up at the same time every morning. Keep your bedroom dark and quiet. Do not exercise after 5:00 p.m. Avoid drinks with caffeine after 5:00 p.m. Avoid sleeping pills unless they are prescribed by the doctor treating your depression. Sleeping pills may make you groggy during the day, and they may interact with other medicine you are taking. If you have any other illnesses, such as diabetes, heart disease, or high blood pressure, make sure to continue with your treatment. Tell your doctor about all of the medicines you take, including those with or without a prescription. Keep the numbers for these national suicide hotlines: 9-654-646-TALK (6-840-364-008-359-5865) and 9-694-CLGZWWQ (4-090-832-229.520.2791). If you or someone you know talks about suicide or feeling hopeless, get help right away. When should you call for help? Call 911 anytime you think you may need emergency care.  For example, call if:    You feel like hurting yourself or someone else.     Someone you know has depression and is about to attempt or is attempting suicide.    Call your doctor now or seek immediate medical care if:    You hear voices.     Someone you know has depression and:  Starts to give away his or her possessions. Uses illegal drugs or drinks alcohol heavily. Talks or writes about death, including writing suicide notes or talking about guns, knives, or pills. Starts to spend a lot of time alone. Acts very aggressively or suddenly appears calm.    Watch closely for changes in your health, and be sure to contact your doctor if:    You do not get better as expected. Where can you learn more? Go to http://luis daniel-rashawn.info/. Enter W427 in the search box to learn more about \"Recovering From Depression: Care Instructions. \"  Current as of: May 28, 2019  Content Version: 12.2  © 7161-0276 [employer]. Care instructions adapted under license by [employer] (which disclaims liability or warranty for this information). If you have questions about a medical condition or this instruction, always ask your healthcare professional. [address] any warranty or liability for your use of this information.     ·

## 2019-09-26 NOTE — PROGRESS NOTES
Psychiatric Outpatient Progress Note    Account Number:  [id number]  Name: Madisyn Bello    SUBJECTIVE:   CHIEF COMPLAINT:  Madisyn Bello is a 41 y.o. female and was seen today for follow-up of psychiatric condition and therapy/ psychotropic medication management. Last office visit was 2019. HPI: Lui Valentin reports the following psychiatric symptoms:client  Has genetic loading for a psychiatric d/o and no personal history of substance abuse. No abuse history noted. PMH is significant for Multiple sclerosis and restless leg syndrome.   Reported received treatment for depression since . Last provider was Dr Hany Cartagena and is stable on trintellix and abllify  Reported sleeping for 9 hrs and melatonin is benefiting and is sleeping fitful. Reported has fair appetite, interest, has fair energy, has motivation and able to focus and concentrate. Denied any hopelessness or helplessness sor passive suicide thoughts. Denied any symptoms of destiny or psychosis. Additional symptomatology include anxiety and is stable at this time and takes prn .    The above symptoms have been present for a few years post diagnoses of MS. The patient reports onset of symptoms in . These symptoms are of low severity as per patient's report. The symptoms are variable in nature.  The patient's condition has been precipitated by and condition worsened with stressors. Stressors/life events: MS and restless leg, job was stressful, .mother  on   Pt denied any flashbacks, hypervigilance or avoidance or reexperience or night medina.  Pt denied any h/o seizures or head trauma or neurological problems.   Client denied any SI or any plan or intent; denied HI or SIB. There is no evidence of hallucinations, psychosis or destiny at this time. disorientation. Contributing factors include: MS .  Patient denies SI/HI/SIB.      Side Effects:  none    Social History:   Social History               Socioeconomic History    Marital status: SINGLE       Spouse name: Not on file    Number of children: Not on file    Years of education: Not on file    Highest education level: Not on file   Tobacco Use    Smoking status: Never Smoker    Smokeless tobacco: Never Used   Substance and Sexual Activity    Alcohol use: Yes       Comment: occasionally    Drug use: No    Sexual activity: Never       Partners: Male       Birth control/protection: Pill            Ethnic:   Relationship Status: single  Kids: none  Living Situation: Alone   Born: [place]  Raised by: parents  Siblings: 1 sister  Education:  Masters in Public admin  Employment: on permanent disability  Tobacco:  no tobacco use  Caffeine: caffeine intake: 1 cups of caffeinated coffee per day(s)  Alcohol: alcohol intake:social drinker  Illicit Drug Social History:  no history of illicit drug use  Hobbies:  music   Abuse: denied  Sexual:  heterosexual  Support: family  Legal: denied    Family Psychiatric history: Her father has bipolar ds and is on meds. There is no history of suicide attempt in the family.     Fam/Soc Hx (from [place] with updates):    Family History   Problem Relation Age of Onset    Diabetes Mother     Hypertension Mother     MS Mother     Cancer Mother         breast    Bipolar Disorder Father     No Known Problems Sister     Breast Cancer Maternal Grandmother       Social History     Tobacco Use    Smoking status: Never Smoker    Smokeless tobacco: Never Used   Substance Use Topics    Alcohol use: Yes     Comment: occasionally    Drug use: No       Scales:06/2019- PHQ 9 score:13- moderate depression  HAM: 9-mild  anxiety   mood disorder questionnaire:negative       REVIEW OF SYSTEMS:  Psychiatric:  depression  Appetite:no change from normal   Sleep: fitful   Neuro: none reported   NKECHI:                 Mental Status exam: WNL except for    Sensorium  oriented to time, place and person   Orientation person, place, time/date, situation, day of week, month of year and year   Relations cooperative   Eye Contact appropriate   Appearance:  age appropriate, casually dressed, overweight and within normal Limits   Motor Behavior:  gait stable and within normal limits   Speech:  normal pitch and normal volume   Vocabulary average   Thought Process: goal directed, logical and within normal limits   Thought Content free of delusions and free of hallucinations   Suicidal ideations none   Homicidal ideations none   Mood:  euthymic   Affect:  euthymic and mood-congruent   Memory recent  adequate   Memory remote:  adequate   Concentration:  adequate   Abstraction:  abstract   Insight:  fair   Reliability fair   Judgment:  fair      MEDICAL DECISION MAKING  Data: pertinent labs, imaging, medical records and diagnostic tests reviewed and incorporated in diagnosis and treatment plan    Allergies   Allergen Reactions    Amoxicillin Hives    Penicillins Hives        Current Outpatient Medications   Medication Sig Dispense Refill    ARIPiprazole (ABILIFY) 10 mg tablet TK 1 T PO Daily 30 Tab 5    benztropine (COGENTIN) 0.5 mg tablet TAKE 1 TABLET BY MOUTH TWICE DAILY 60 Tab 5    vortioxetine (TRINTELLIX) 20 mg tablet Take 1 Tab by mouth daily. 30 Tab 5    senna-docusate (PERICOLACE) 8.6-50 mg per tablet Take 1 Tab by mouth two (2) times daily as needed for Constipation. 60 Tab 3    dimethyl fumarate (TECFIDERA) 240 mg cpDR Take 1 Cap by mouth two (2) times a day. 180 Cap 3    ferrous sulfate 325 mg (65 mg iron) tablet Take 1 Tab by mouth Daily (before breakfast). Visit Vitals  /88 (BP 1 Location: Left arm, BP Patient Position: Sitting)   Pulse 89   Ht 5' 7\" (1.702 m)   Wt 121.8 kg (268 lb 9.6 oz)   LMP 09/10/2019   BMI 42.07 kg/m²         Problems addressed today:  moderately severe depression, depression due to medical condition, , anxiety, grief     Assessment:   Tiffany Dawn  is a 41 y.o. AA female presents with depression and anxiety.  Today she reported she ahd a fall and pain on rt foot, no swelling and is ambulating with cane. reported has been stable for 2 year on vortioxetine and abilify. reported takes lorazepam prn for anxiety and last use was one month ago. Reported cogentin is benefiting with akathisia r/t abilify. Plan to continue abilify and vortioxetine to target depression and anxiety. Plan to adjust the medications as per the response and tolerability. She is attending grief therapy at [facility]. and psychotherapy with Dr. Elver Connors. Reviewed medical admissions and discussed with the patient. Client is medically . .stable. Vitals stableSymptoms are occurring infrequently. Remmarina Garces .Reports compliance. No gross psychosis or destiny. No side effects reported. Reviewed labs. Patient denies SI/HI/SIB. No evidence of AH/VH or delusions. Client is responding to treatment and is tolerating treatment well. Psychoeducation, medication teaching, co-morbid illness and pertinent health factors to manage care were discussed. Overall, patient is stable at this time and will require ongoing medication management. Possible organic causes contributing are:MS and restless leg, Anemia  Reviewed medical admissions and discussed with the patient. Client is medically stable. Vitals stable  Risk Scoring- chronic illnesses and prescription drug management    Treatment Plan:  1. Medications:          Medication Changes/Adjustments: continue viortexetine 20 mg daily                          Continue abilify 10 mg HS                           Continue benztropine 0.5 mg BID                                                        Continue grief therapy and CBT     Current Outpatient Medications   Medication Sig Dispense Refill    ARIPiprazole (ABILIFY) 10 mg tablet TK 1 T PO Daily 30 Tab 5    benztropine (COGENTIN) 0.5 mg tablet TAKE 1 TABLET BY MOUTH TWICE DAILY 60 Tab 5    vortioxetine (TRINTELLIX) 20 mg tablet Take 1 Tab by mouth daily.  30 Tab 5    senna-docusate (PERICOLACE) 8.6-50 mg per tablet Take 1 Tab by mouth two (2) times daily as needed for Constipation. 60 Tab 3    dimethyl fumarate (TECFIDERA) 240 mg cpDR Take 1 Cap by mouth two (2) times a day. 180 Cap 3    ferrous sulfate 325 mg (65 mg iron) tablet Take 1 Tab by mouth Daily (before breakfast). The following regarding medications was addressed:    (The risks and benefits of the proposed medication; the potential medication side effects ie    dry mouth, weight gain, GI upset, headache; patient given opportunity to ask questions)       2. Counseling and coordination of care including instructions for treatment, risks/benefits, risk factor reduction and patient/family education. She agrees with the plan. Patient instructed to call with any side effects, questions or issues. Discussed expected course/resolution/complications of diagnosis in detail with patient. Medication risks/benefits/costs/interactions/alternatives discussed with patient. Pt was given after visit summary which includes diagnoses, current medications & vitals. Pt expressed understanding with the diagnosis and plan      Instructed patient to call the clinic, and if after hours call the provider on call ifclient experiences any suicidal thought or ideas to hurt self or other. Also instructed to call 911 or go to the ED. Patient verbalized understanding and agreed to call    3. Follow-up and Dispositions    · Return in about 6 months (around 3/26/2020) for med check and follow up. 4. Other: Nutritional/health counseling on diet and exercise. For reliable dietary information, go to www. [employer].org.     PSYCHOTHERAPY:  approx 16 minutes  Type:  Supportive/Cognitive Behavioral psychotherapy provided  Focus:     Current problems-foot pain   Occupational issues- unemployed -financila issues , disability stopped   Medical issues- MS      Education : Exercise- walk 20 minutes daily                   obesity, Psychoeducation provided  Treatment plan reviewed with patient-including diagnosis and medications    Bri is progressing.     Katie Roberts NP  9/26/2019

## 2019-10-01 NOTE — PROGRESS NOTES
[address]  Phone: [phone]  Fax: [phone]    Medicaid Discharge Summary  2-15    Patient name: Bel العلي  : 1980  Provider#: [id number]  Referral source: Jaylene Gold MD      Medical/Treatment Diagnosis: Other abnormalities of gait and mobility [R26.89]     Prior Hospitalization: see medical history     Comorbidities: See Plan of Care  Prior Level of Function:See Plan of Care  Medications: Verified on Patient Summary List    Start of Care: 19      Onset Date:na    Visits from Start of Care: 9    Missed Visits: 0  Reporting Period : 2019 to 2019      ASSESSMENT/SUMMARY OF CARE:   Patient has made good progress towards goals and has met 2/3 short term and 2/3 long term goals. Patient has been encouraged to continue HEP to address imbalance and weakness.    Patient has score 18% on ABC test indicating improved confidence in balance compared to initial evaluation of 13%.      Short Term Goals: To be accomplished in 4 weeks:  1) The patient will be independent with introductory HEP Met  2) The patient will demonstrate negative Romberg to indicate decreased risk of falls Progressing  3) The patient will demonstrate ability to complete the TUG in12 seconds or less to indicate improved functional mobility Met  Long Term Goals: To be accomplished in 12 weeks:  1) The patient will transfer sit to/from stand without UE assist to improve mobility in the home Met  2) The patient will ambulate community distances with LRAD, on even and uneven surfaces without fear of falling Met  3) The patient will improve ABC score to 50% or greater to indicate improved confidence with her balance Not Met    RECOMMENDATIONS:  [x]Discontinue therapy: [x]Patient has reached or is progressing toward set goals      []Patient is non-compliant or has abdicated      []Due to lack of appreciable progress towards set goals      []Other    Rena Bright, PT 10/1/2019     ______________________________________________________________________    NOTE TO PHYSICIAN:  Please complete the following and fax to: [facility]: [phone]  Retain this original for your records. If you are unable to process this request in 24 hours, please contact our office.        [de-identified] Signature:____________________  Date:____________Time:_________

## 2019-10-16 LAB
BASOPHILS # BLD AUTO: 0.1 X10E3/UL (ref 0–0.2)
BASOPHILS NFR BLD AUTO: 2 %
EOSINOPHIL # BLD AUTO: 0.1 X10E3/UL (ref 0–0.4)
EOSINOPHIL NFR BLD AUTO: 2 %
ERYTHROCYTE [DISTWIDTH] IN BLOOD BY AUTOMATED COUNT: 16.4 % (ref 12.3–15.4)
FERRITIN SERPL-MCNC: 44 NG/ML (ref 15–150)
HCT VFR BLD AUTO: 39.5 % (ref 34–46.6)
HGB BLD-MCNC: 13.8 G/DL (ref 11.1–15.9)
IMM GRANULOCYTES # BLD AUTO: 0 X10E3/UL (ref 0–0.1)
IMM GRANULOCYTES NFR BLD AUTO: 0 %
IRON SATN MFR SERPL: 23 % (ref 15–55)
IRON SERPL-MCNC: 69 UG/DL (ref 27–159)
LYMPHOCYTES # BLD AUTO: 1.9 X10E3/UL (ref 0.7–3.1)
LYMPHOCYTES NFR BLD AUTO: 33 %
MCH RBC QN AUTO: 29.1 PG (ref 26.6–33)
MCHC RBC AUTO-ENTMCNC: 34.9 G/DL (ref 31.5–35.7)
MCV RBC AUTO: 83 FL (ref 79–97)
MONOCYTES # BLD AUTO: 0.4 X10E3/UL (ref 0.1–0.9)
MONOCYTES NFR BLD AUTO: 6 %
NEUTROPHILS # BLD AUTO: 3.2 X10E3/UL (ref 1.4–7)
NEUTROPHILS NFR BLD AUTO: 57 %
PLATELET # BLD AUTO: 353 X10E3/UL (ref 150–450)
RBC # BLD AUTO: 4.74 X10E6/UL (ref 3.77–5.28)
TIBC SERPL-MCNC: 294 UG/DL (ref 250–450)
UIBC SERPL-MCNC: 225 UG/DL (ref 131–425)
WBC # BLD AUTO: 5.7 X10E3/UL (ref 3.4–10.8)

## 2019-10-23 DIAGNOSIS — D50.0 IRON DEFICIENCY ANEMIA DUE TO CHRONIC BLOOD LOSS: ICD-10-CM

## 2019-10-23 NOTE — PROGRESS NOTES
[address] [facility]  [phone]    Hematology / Oncology Established Visit    Reason for Visit:   Nikko Vences is a 46 y.o. female who comes in for f/u of anemia. PCP is Dr. Shaun Alvarenga. History of Present Illness:   Ms. Candise Krabbe is a 46 y.o. female with MS, Depression, obesity who comes in for f/u of anemia. Review of labs reveals Hgb dropped down to 9.6 in 6/2019 and then increased to 10.6 in 7/2019. Pt states she does not take her iron pills regularly, but just recently restarted taking them once a day last week. This does occasionally cause her constipation, but no stomach upset. She states she has heavy periods and was taken off of her OCPs due to HTN. She underwent EGD, colonoscopy in 8/2018 followed by capsule endoscopy, negative for any source of bleeding. No ice cravings, melena/hematochezia, GERD. She has some SOB. Interval History: Patient here for follow up of anemia. Her labs show she is not iron deficient. She is taking iron twice daily. Reports normal energy level. Denies SOB. Reports her periods are lighter since going off BC due to hypertensions. Denies blood in stool. States she trip over a step and broke her left foot, she is currently in a boot and ambulating with assistance of a cane. Past Medical History:   Diagnosis Date    Abnormal pap 3/2015; 4/2016; 5/16/17 2015 LGSIL +HPV; 2013 neg pap +HPV;4/2016 neg pap +HPV; 5/16/17 Neg pap +HPV    Anemia     Depression     Eczema     H/O colposcopy with cervical biopsy 3/15 + 10/2013    neg    History of seizure 4/15/2016    One in 2008    Hypercholesterolemia 5/22/2018    Morbid obesity ([address]     Multiple sclerosis ([address]     Psychiatric disorder     depression    Seizures (HCC)       Past Surgical History:   Procedure Laterality Date    HX BUNIONECTOMY      HX CHOLECYSTECTOMY      Robotic-assisted laparoscopic cholecystectomy with firefly.     HX COLPOSCOPY  6/2016; 6/8/17    neg  HX GYN      HX LEEP PROCEDURE  6/08    neg    HX ORTHOPAEDIC Left 2012    Left bunionectomy.  HX ORTHOPAEDIC Right     Right bunionectomy. Social History     Tobacco Use    Smoking status: Never Smoker    Smokeless tobacco: Never Used   Substance Use Topics    Alcohol use: Yes     Comment: occasionally      Family History   Problem Relation Age of Onset    Diabetes Mother     Hypertension Mother     MS Mother     Cancer Mother         breast    Bipolar Disorder Father     No Known Problems Sister     Breast Cancer Maternal Grandmother      Current Outpatient Medications   Medication Sig    ARIPiprazole (ABILIFY) 10 mg tablet TK 1 T PO Daily    benztropine (COGENTIN) 0.5 mg tablet TAKE 1 TABLET BY MOUTH TWICE DAILY    vortioxetine (TRINTELLIX) 20 mg tablet Take 1 Tab by mouth daily.  senna-docusate (PERICOLACE) 8.6-50 mg per tablet Take 1 Tab by mouth two (2) times daily as needed for Constipation.  dimethyl fumarate (TECFIDERA) 240 mg cpDR Take 1 Cap by mouth two (2) times a day.  ferrous sulfate 325 mg (65 mg iron) tablet Take 1 Tab by mouth Daily (before breakfast). No current facility-administered medications for this visit. Allergies   Allergen Reactions    Amoxicillin Hives    Penicillins Hives        Review of Systems: A complete review of systems was obtained, negative except as described above.     Physical Exam:     Visit Vitals  /86 (BP 1 Location: Left arm, BP Patient Position: Sitting)   Pulse (!) 105   Temp 97.6 °F (36.4 °C) (Oral)   Ht 5' 7\" (1.702 m)   Wt 272 lb (123.4 kg)   SpO2 97%   BMI 42.60 kg/m²     ECOG PS: 2  General: Well developed, no acute distress, ambulating with cane  Eyes: PERRLA, EOMI, anicteric sclerae  HENT: Atraumatic, OP clear, TMs intact without erythema  Neck: Supple  Lymphatic: No cervical, supraclavicular, axillary or inguinal adenopathy  Respiratory: CTAB, normal respiratory effort  CV: Normal rate, regular rhythm, no murmurs, no peripheral edema  GI: Soft, nontender, nondistended, no masses, no hepatomegaly, no splenomegaly  MS: Normal gait walking with a cane, boot on left foot. Digits without clubbing or cyanosis. Skin: No rashes, ecchymoses, or petechiae. Normal temperature, turgor, and texture. Neuro/Psych: Alert, oriented. 5/5 strength in all 4 extremities. Appropriate affect, normal judgment/insight. Results:     Lab Results   Component Value Date/Time    WBC 5.7 10/15/2019 01:43 PM    HGB 13.8 10/15/2019 01:43 PM    HCT 39.5 10/15/2019 01:43 PM    PLATELET 146 40/42/8568 01:43 PM    MCV 83 10/15/2019 01:43 PM    ABS. NEUTROPHILS 3.2 10/15/2019 01:43 PM     Lab Results   Component Value Date/Time    Sodium 139 03/14/2019 02:35 PM    Potassium 4.4 03/14/2019 02:35 PM    Chloride 103 03/14/2019 02:35 PM    CO2 22 03/14/2019 02:35 PM    Glucose 92 03/14/2019 02:35 PM    BUN 8 03/14/2019 02:35 PM    Creatinine 0.56 (L) 03/14/2019 02:35 PM    GFR est  03/14/2019 02:35 PM    GFR est non- 03/14/2019 02:35 PM    Calcium 9.4 03/14/2019 02:35 PM     Lab Results   Component Value Date/Time    Bilirubin, total 0.2 03/14/2019 02:35 PM    ALT (SGPT) 46 (H) 03/14/2019 02:35 PM    AST (SGOT) 36 03/14/2019 02:35 PM    Alk.  phosphatase 83 03/14/2019 02:35 PM    Protein, total 7.8 03/14/2019 02:35 PM    Albumin 4.7 03/14/2019 02:35 PM    Globulin 4.1 (H) 04/06/2017 01:06 PM     Lab Results   Component Value Date/Time    Iron 69 10/15/2019 01:43 PM    TIBC 294 10/15/2019 01:43 PM    Iron % saturation 23 10/15/2019 01:43 PM    Ferritin 44 10/15/2019 01:43 PM       No results found for: B12LT, FOL, RBCF  Lab Results   Component Value Date/Time    TSH 1.320 06/20/2019 03:20 PM     No results found for: HAMAT, HAAB, HABT, HAAT, HBSAG, HBSB, HBSAT, HBABN, HBCM, HBCAB, HBCAT, Lorenzo Jasvir, [address] [facility], [address] [facility], [facility], [id number], [address] [facility], [facility], HBCLT, [address] [facility], [id number], [id number], [address] [facility], [id number], HBCMLT, [id number], HCGAT      Imaging: Radiology report(s) reviewed. Procedures:     9/25/18 Capsule endoscopy: Negative for any evidence of bleeding. Assessment & Plan:   Cynthia Powers is a 46 y.o. female comes in for evaluation and management of iron deficiency anemia. 1. H/o Iron deficiency anemia: Now improved with oral iron supplementation. Was 2/2 menorrhagia and had worsening while pt was off of her oral iron. In the past, her anemia resolved to Hgb > 12 with oral iron supplementation as well. She previously admitted she was not taking the iron pill regularly and had just recently restarted them. I recommend continuing ferrous sulfate 325mg bid with meals and stool softeners. Normal colonoscopy in 8/2018 by Dr. Pattie Orantes. EGD, capsule endoscopy approx 8/2018 were unrevealing. 10/15/19 labs are normal.   -- Return in 6 months with repeat labs. 2. H/o Thrombocytosis: Now resolved and was likely reactive 2/2 iron deficiency. 3. Multiple sclerosis: On Tecfidera, followed by Dr. Ruby Gilbert. 4. Menorrhagia: Pt states she was taken off of OCPs due to HTN. Reports periods are now \"lighter,\" since going off BC. I appreciate the opportunity to participate in Ms. [address] [facility].     Signed By: Susan Ranegl MD     October 24, 2019

## 2019-10-24 ENCOUNTER — OFFICE VISIT (OUTPATIENT)
Dept: ONCOLOGY | Age: 39
End: 2019-10-24

## 2019-10-24 VITALS
SYSTOLIC BLOOD PRESSURE: 123 MMHG | HEIGHT: 67 IN | TEMPERATURE: 97.6 F | DIASTOLIC BLOOD PRESSURE: 86 MMHG | WEIGHT: 272 LBS | BODY MASS INDEX: 42.69 KG/M2 | OXYGEN SATURATION: 97 % | HEART RATE: 105 BPM

## 2019-10-24 DIAGNOSIS — D50.0 IRON DEFICIENCY ANEMIA DUE TO CHRONIC BLOOD LOSS: Primary | ICD-10-CM

## 2019-10-24 DIAGNOSIS — Z86.2 HISTORY OF THROMBOCYTOSIS: ICD-10-CM

## 2019-10-24 NOTE — PROGRESS NOTES
Adry Peters is a 46 y.o. female here for follow up of anemia. Has complaints of upper right-sided chest pain, rates as a 3 out of 10. Describes pain as feeling like a muscle pain. Denies nausea and shortness of breath. Pain occurs intermittently over the past couple months.

## 2019-11-15 ENCOUNTER — TELEPHONE (OUTPATIENT)
Dept: NEUROLOGY | Age: 39
End: 2019-11-15

## 2019-11-15 ENCOUNTER — PATIENT MESSAGE (OUTPATIENT)
Dept: NEUROLOGY | Age: 39
End: 2019-11-15

## 2019-11-15 DIAGNOSIS — R26.9 GAIT ABNORMALITY: Primary | ICD-10-CM

## 2019-11-15 DIAGNOSIS — G35 MS (MULTIPLE SCLEROSIS) (HCC): ICD-10-CM

## 2019-11-15 NOTE — TELEPHONE ENCOUNTER
----- Message from Sandra Cobian sent at 11/15/2019 12:13 PM EST -----  Regarding: Dr. Bart Pan  Patient return call    Caller's first and last name and relationship (if not the patient):      Best contact number(s): [phone]      Whose call is being returned: Kwadwo Roberts      Details to clarify the request:      Sandra Cobian

## 2019-11-20 ENCOUNTER — PATIENT MESSAGE (OUTPATIENT)
Dept: NEUROLOGY | Age: 39
End: 2019-11-20

## 2019-11-27 NOTE — TELEPHONE ENCOUNTER
From: Fannie Jean  To: Morales Logan MD  Sent: 11/20/2019 2:46 PM EST  Subject: Non-Urgent Medical Question    Can i please change my appointment to a Tuesday or Thursday afternoon?  Thanks

## 2019-12-21 ENCOUNTER — APPOINTMENT (OUTPATIENT)
Dept: CT IMAGING | Age: 39
End: 2019-12-21
Attending: EMERGENCY MEDICINE
Payer: MEDICAID

## 2019-12-21 ENCOUNTER — HOSPITAL ENCOUNTER (EMERGENCY)
Age: 39
Discharge: HOME | End: 2019-12-22
Attending: EMERGENCY MEDICINE | Admitting: EMERGENCY MEDICINE
Payer: MEDICAID

## 2019-12-21 DIAGNOSIS — S42.332A CLOSED DISPLACED OBLIQUE FRACTURE OF SHAFT OF LEFT HUMERUS, INITIAL ENCOUNTER: Primary | ICD-10-CM

## 2019-12-21 PROCEDURE — 96372 THER/PROPH/DIAG INJ SC/IM: CPT

## 2019-12-21 PROCEDURE — 73200 CT UPPER EXTREMITY W/O DYE: CPT

## 2019-12-21 PROCEDURE — 99283 EMERGENCY DEPT VISIT LOW MDM: CPT

## 2019-12-21 PROCEDURE — 99284 EMERGENCY DEPT VISIT MOD MDM: CPT

## 2019-12-21 PROCEDURE — 74011250636 HC RX REV CODE- 250/636: Performed by: EMERGENCY MEDICINE

## 2019-12-21 PROCEDURE — 75810000053 HC SPLINT APPLICATION

## 2019-12-21 PROCEDURE — 74011250637 HC RX REV CODE- 250/637: Performed by: EMERGENCY MEDICINE

## 2019-12-21 RX ORDER — OXYCODONE AND ACETAMINOPHEN 5; 325 MG/1; MG/1
1 TABLET ORAL
Status: COMPLETED | OUTPATIENT
Start: 2019-12-21 | End: 2019-12-21

## 2019-12-21 RX ORDER — HYDROMORPHONE HYDROCHLORIDE 2 MG/ML
1 INJECTION, SOLUTION INTRAMUSCULAR; INTRAVENOUS; SUBCUTANEOUS ONCE
Status: COMPLETED | OUTPATIENT
Start: 2019-12-21 | End: 2019-12-21

## 2019-12-21 RX ADMIN — HYDROMORPHONE HYDROCHLORIDE 1 MG: 2 INJECTION, SOLUTION INTRAMUSCULAR; INTRAVENOUS; SUBCUTANEOUS at 22:11

## 2019-12-21 RX ADMIN — OXYCODONE HYDROCHLORIDE AND ACETAMINOPHEN 1 TABLET: 5; 325 TABLET ORAL at 21:39

## 2019-12-22 VITALS
SYSTOLIC BLOOD PRESSURE: 158 MMHG | TEMPERATURE: 99.1 F | RESPIRATION RATE: 18 BRPM | BODY MASS INDEX: 39.4 KG/M2 | DIASTOLIC BLOOD PRESSURE: 84 MMHG | HEIGHT: 68 IN | OXYGEN SATURATION: 96 % | WEIGHT: 260 LBS | HEART RATE: 94 BPM

## 2019-12-22 PROCEDURE — 74011250637 HC RX REV CODE- 250/637: Performed by: EMERGENCY MEDICINE

## 2019-12-22 RX ORDER — OXYCODONE AND ACETAMINOPHEN 5; 325 MG/1; MG/1
1 TABLET ORAL
Status: COMPLETED | OUTPATIENT
Start: 2019-12-22 | End: 2019-12-22

## 2019-12-22 RX ORDER — OXYCODONE AND ACETAMINOPHEN 5; 325 MG/1; MG/1
1 TABLET ORAL
Qty: 15 TAB | Refills: 0 | Status: SHIPPED | OUTPATIENT
Start: 2019-12-22 | End: 2019-12-27

## 2019-12-22 RX ADMIN — OXYCODONE HYDROCHLORIDE AND ACETAMINOPHEN 1 TABLET: 5; 325 TABLET ORAL at 00:50

## 2019-12-22 NOTE — ED PROVIDER NOTES
Date of Service:  12/21/2019    Patient:  Jackson Ansari    Chief Complaint:  Fracture and Multiple Sclerosis       HPI:  Jackson Ansari is a 46 y.o. female with past medical history significant for seizures, depression, eczema, MS, obesity, hypercholesteremia, and anemia who presents for evaluation of left humerus fracture. The patient reports she tripped over a rug in her family room this afternoon. She notes she fell onto her stomach however her left arm was caught underneath her at impact. She denies hitting her head or LOC during the fall. She was evaluated at [facility] where x-rays were taken, confirming a fracture of the left humerus. A sling was applied to the patient and she was instructed to follow-up in the emergency department due to the severity of the fracture. The patient reports minimal pain at rest and 10/10 pain with movement. She took 2 200 mg ibuprofen prior to arrival without relief and there are no alleviating factors. There are no other acute medical concerns at this time. Social hx: denies tobacco, occasional alcohol, denies illicit drug use  Surgical Hx: LEEP, colposcopy, bunionectomy, GYN, cholecystectomy   Allergies: Amoxicillin, Penicillin     PCP: Jeremi Rogers MD    Note written by Alem Tobar, as dictated by Montez Blackwell, DO 9:24 PM            The history is provided by the patient. No  was used.         Past Medical History:   Diagnosis Date    Abnormal pap 3/2015; 4/2016; 5/16/17 2015 LGSIL +HPV; 2013 neg pap +HPV;4/2016 neg pap +HPV; 5/16/17 Neg pap +HPV    Anemia     Depression     Eczema     H/O colposcopy with cervical biopsy 3/15 + 10/2013    neg    History of seizure 4/15/2016    One in 2008    Hypercholesterolemia 5/22/2018    Morbid obesity ([address]     Multiple sclerosis [facility]     Psychiatric disorder     depression    Seizures ([facility]        Past Surgical History:   Procedure Laterality Date    HX BUNIONECTOMY      HX CHOLECYSTECTOMY      Robotic-assisted laparoscopic cholecystectomy with firefly.  HX COLPOSCOPY  6/2016; 6/8/17    neg    HX GYN      HX LEEP PROCEDURE  6/08    neg    HX ORTHOPAEDIC Left 2012    Left bunionectomy.  HX ORTHOPAEDIC Right     Right bunionectomy.          Family History:   Problem Relation Age of Onset    Diabetes Mother     Hypertension Mother     MS Mother     Cancer Mother         breast    Bipolar Disorder Father     No Known Problems Sister     Breast Cancer Maternal Grandmother        Social History     Socioeconomic History    Marital status: SINGLE     Spouse name: Not on file    Number of children: Not on file    Years of education: Not on file    Highest education level: Not on file   Occupational History    Not on file   Social Needs    Financial resource strain: Not on file    Food insecurity:     Worry: Not on file     Inability: Not on file    Transportation needs:     Medical: Not on file     Non-medical: Not on file   Tobacco Use    Smoking status: Never Smoker    Smokeless tobacco: Never Used   Substance and Sexual Activity    Alcohol use: Yes     Comment: occasionally    Drug use: No    Sexual activity: Never     Partners: Male     Birth control/protection: Pill   Lifestyle    Physical activity:     Days per week: Not on file     Minutes per session: Not on file    Stress: Not on file   Relationships    Social connections:     Talks on phone: Not on file     Gets together: Not on file     Attends Mandaen service: Not on file     Active member of club or organization: Not on file     Attends meetings of clubs or organizations: Not on file     Relationship status: Not on file    Intimate partner violence:     Fear of current or ex partner: Not on file     Emotionally abused: Not on file     Physically abused: Not on file     Forced sexual activity: Not on file   Other Topics Concern    Not on file   Social History Narrative    Not on file ALLERGIES: Amoxicillin and Penicillins    Review of Systems   Constitutional: Negative for fever. HENT: Negative for hearing loss. Eyes: Negative for visual disturbance. Respiratory: Negative for shortness of breath. Cardiovascular: Negative for chest pain. Gastrointestinal: Negative for abdominal pain. Genitourinary: Negative for flank pain. Musculoskeletal: Positive for myalgias (left humerus). Negative for back pain. Skin: Negative for rash. Neurological: Negative for dizziness, syncope and light-headedness. Vitals:    12/21/19 2105 12/21/19 2143 12/21/19 2209 12/22/19 0045   BP: (!) 145/95   158/84   Pulse: 94      Resp: 18      Temp: 99.1 °F (37.3 °C)      SpO2: 96% 96% 97% 96%   Weight: 117.9 kg (260 lb)      Height: 5' 8\" (1.727 m)               Physical Exam  Constitutional:       General: She is not in acute distress. Appearance: She is well-developed. HENT:      Head: Normocephalic and atraumatic. Eyes:      General: No scleral icterus. Conjunctiva/sclera: Conjunctivae normal.      Pupils: Pupils are equal, round, and reactive to light. Neck:      Musculoskeletal: Neck supple. Vascular: No JVD. Trachea: No tracheal deviation. Cardiovascular:      Rate and Rhythm: Normal rate and regular rhythm. Heart sounds: No murmur. No gallop. Pulmonary:      Effort: Pulmonary effort is normal. No respiratory distress. Breath sounds: Normal breath sounds. Abdominal:      General: Bowel sounds are normal. There is no distension. Palpations: Abdomen is soft. Tenderness: There is no tenderness. Musculoskeletal: Normal range of motion. General: No deformity. Left shoulder: She exhibits no tenderness. Left elbow: No tenderness found. Comments: Tenderness mid-shaft humerus. Obvious fracture to mid-shaft humerus. Normal flexion/extension of the wrist   Skin:     General: Skin is warm and dry.       Capillary Refill: Capillary refill takes less than 2 seconds. Findings: No rash. Neurological:      Mental Status: She is alert and oriented to person, place, and time. Comments: Good pulses, strength, and wrist flexion   Psychiatric:         Behavior: Behavior normal.         Thought Content: Thought content normal.         Judgment: Judgment normal.      Note written by Alem Rice, as dictated by No att. providers found 9:24 PM      MDM  Number of Diagnoses or Management Options  Closed displaced oblique fracture of shaft of left humerus, initial encounter:         VITAL SIGNS:  No data found. LABS:  No results found for this or any previous visit (from the past 6 hour(s)). IMAGING:  CT UP EXT LT WO CONT                Medications During Visit:  Medications   oxyCODONE-acetaminophen (PERCOCET) 5-325 mg per tablet 1 Tab (1 Tab Oral Given 12/21/19 2139)   HYDROmorphone (PF) (DILAUDID) injection 1 mg (1 mg IntraMUSCular Given 12/21/19 2211)   oxyCODONE-acetaminophen (PERCOCET) 5-325 mg per tablet 1 Tab (1 Tab Oral Given 12/22/19 0050)         DECISION MAKING:  Raimundo Corona is a 46 y.o. female who comes in as above. Here, patient's outpatient x-rays are reviewed. I did speak with orthopedics who recommended a coaptation splint with posterior long-arm and outpatient follow-up. Splint was placed and a CT without contrast was obtained at their request this orthopedic surgery. This time patient continues to have a good neurovascular exam.  Patient will be discharged home with outpatient orthopedic follow-up. Return instructions discussed. Patient stable disposition      IMPRESSION:  1.  Closed displaced oblique fracture of shaft of left humerus, initial encounter        DISPOSITION:  Discharged      Discharge Medication List as of 12/22/2019 12:43 AM      START taking these medications    Details   oxyCODONE-acetaminophen (PERCOCET) 5-325 mg per tablet Take 1 Tab by mouth every six (6) hours as needed for Pain for up to 4 days. Max Daily Amount: 4 Tabs., Print, Disp-15 Tab, R-0         CONTINUE these medications which have NOT CHANGED    Details   ARIPiprazole (ABILIFY) 10 mg tablet TK 1 T PO Daily, Normal, Disp-30 Tab, R-5      benztropine (COGENTIN) 0.5 mg tablet TAKE 1 TABLET BY MOUTH TWICE DAILY, Normal, Disp-60 Tab, R-5      vortioxetine (TRINTELLIX) 20 mg tablet Take 1 Tab by mouth daily. , Normal, Disp-30 Tab, R-5      senna-docusate (PERICOLACE) 8.6-50 mg per tablet Take 1 Tab by mouth two (2) times daily as needed for Constipation. , Normal, Disp-60 Tab, R-3      dimethyl fumarate (TECFIDERA) 240 mg cpDR Take 1 Cap by mouth two (2) times a day., Normal, Disp-180 Cap, R-3      ferrous sulfate 325 mg (65 mg iron) tablet Take 1 Tab by mouth Daily (before breakfast). , OTC              Follow-up Information     Follow up With Specialties Details Why Contact Info    Maira John MD Family Practice Schedule an appointment as soon as possible for a visit   One [address] Gage Portillo MD Orthopedic Surgery Call in 2 days  [facility] [address] [place] [id number]              The patient is asked to follow-up with their primary care provider in the next several days. They are to call tomorrow for an appointment. The patient is asked to return promptly for any increased concerns or worsening of symptoms. They can return to this emergency department or any other emergency department.       Procedures

## 2019-12-22 NOTE — ED TRIAGE NOTES
\"I fell in the family room, I think I tripped over the rug.\" Patient has a humerus fracture to her left arm, was seen at [facility] and has X-ray disc with her. They placed her in a sling and sent her here for further evaluation. She has not received any pain medication yet. She has MS and reports that today she has had increased leg weakness and is \"not walking too good. \"

## 2019-12-22 NOTE — ED NOTES
Patient seen by provider prior to discharge, given discharge papers by RN. Patient with no further questions. Wheelchair to car and no apparent distress.

## 2019-12-27 NOTE — PERIOP NOTES
[address] [address] [facility]   MAIN OR                                  [phone]   MAIN PRE OP                          [phone]                                                                                AMBULATORY PRE OP          [phone]  PRE-ADMISSION TESTING    [phone]     Surgery Date:   Tuesday, December 31, 2019        Is surgery arrival time given by surgeon? NO  If NO, [facility] staff will call you between 3 and 7pm the day before your surgery with your arrival time. (If your surgery is on a Monday, we will call you the Friday before.)    Call [phone] after 7pm Monday-Friday if you did not receive this call. INSTRUCTIONS BEFORE YOUR SURGERY   When You  Arrive Arrive at the 2nd [address] [facility] on the day of your surgery  Have your insurance card, photo ID, and any copayment (if needed)   Food   and   Drink NO food or drink after midnight the night before surgery    This means NO water, gum, mints, coffee, juice, etc.  No alcohol (beer, wine, liquor) 24 hours before and after surgery   Medications to   TAKE   Morning of Surgery MEDICATIONS TO TAKE THE MORNING OF SURGERY WITH A SIP OF WATER:    Tecfidera   Cogentin   Abillify   Trintellix   Percocet if needed   Medications  To  STOP      7 days before surgery  Non-Steroidal anti-inflammatory Drugs (NSAID's): for example, Ibuprofen (Advil, Motrin), Naproxen (Aleve)   Aspirin, if taking for pain    Herbal supplements, vitamins, and fish oil  (Pain medications not listed above, including Tylenol may be taken)   Blood  Thinners  If you take  Aspirin, Plavix, Coumadin, or any blood-thinning or anti-blood clot medicine, talk to the doctor who prescribed the medications for pre-operative instructions.    Bathing Clothing  Jewelry  Valuables      If you shower the morning of surgery, please do not apply anything to your skin (lotions, powders, deodorant, or makeup, especially mascara)   Do not shave or trim anywhere 24 hours before surgery   Wear your hair loose or down; no pony-tails, buns, or metal hair clips   Wear loose, comfortable, clean clothes   Wear glasses instead of contacts   Leave money, valuables, and jewelry, including body piercings, at home   Going Home - or Spending the Night  SAME-DAY SURGERY: You must have a responsible adult drive you home and stay with you 24 hours after surgery   ADMITS: If your doctor is keeping you in the hospital after surgery, leave personal belongings/luggage in your car until you have a hospital room number. Hospital discharge time is 12 noon  Drivers must be here before 12 noon unless you are told differently   Special Instructions Free  parking available from 7am until 5pm.     Follow all instructions so your surgery wont be cancelled. Please, be on time. If a situation occurs and you are delayed the day of surgery, call [phone]. If your physical condition changes (like a fever, cold, flu, etc.) call your surgeon. Home medication(s) reviewed and verified verbally during PAT phone call. The patient was contacted via phone. The patient verbalizes understanding of all instructions and does not need reinforcement.

## 2019-12-30 ENCOUNTER — ANESTHESIA EVENT (OUTPATIENT)
Dept: SURGERY | Age: 39
End: 2019-12-30
Payer: MEDICAID

## 2019-12-30 NOTE — PERIOP NOTES
Left a VM dolores Rico at Dr. Yunior Damon office requesting orders for surgery be faxed to PAT if before 1500 or to the Main pre-op if after 1500 today.   DOS: 12/31/2019

## 2019-12-31 ENCOUNTER — HOSPITAL ENCOUNTER (OUTPATIENT)
Age: 39
Setting detail: OUTPATIENT SURGERY
Discharge: HOME HEALTH | End: 2019-12-31
Attending: ORTHOPAEDIC SURGERY | Admitting: ORTHOPAEDIC SURGERY
Payer: MEDICAID

## 2019-12-31 ENCOUNTER — APPOINTMENT (OUTPATIENT)
Dept: GENERAL RADIOLOGY | Age: 39
End: 2019-12-31
Attending: ORTHOPAEDIC SURGERY
Payer: MEDICAID

## 2019-12-31 ENCOUNTER — ANESTHESIA (OUTPATIENT)
Dept: SURGERY | Age: 39
End: 2019-12-31
Payer: MEDICAID

## 2019-12-31 VITALS
OXYGEN SATURATION: 96 % | TEMPERATURE: 98.5 F | RESPIRATION RATE: 15 BRPM | SYSTOLIC BLOOD PRESSURE: 116 MMHG | BODY MASS INDEX: 42.21 KG/M2 | WEIGHT: 268.96 LBS | HEART RATE: 103 BPM | HEIGHT: 67 IN | DIASTOLIC BLOOD PRESSURE: 66 MMHG

## 2019-12-31 DIAGNOSIS — S42.352A CLOSED DISPLACED COMMINUTED FRACTURE OF SHAFT OF LEFT HUMERUS, INITIAL ENCOUNTER: Primary | ICD-10-CM

## 2019-12-31 LAB — HCG SERPL QL: NEGATIVE

## 2019-12-31 PROCEDURE — C1713 ANCHOR/SCREW BN/BN,TIS/BN: HCPCS | Performed by: ORTHOPAEDIC SURGERY

## 2019-12-31 PROCEDURE — 77030040361 HC SLV COMPR DVT MDII -B

## 2019-12-31 PROCEDURE — 77030011640 HC PAD GRND REM COVD -A: Performed by: ORTHOPAEDIC SURGERY

## 2019-12-31 PROCEDURE — 74011250636 HC RX REV CODE- 250/636: Performed by: NURSE ANESTHETIST, CERTIFIED REGISTERED

## 2019-12-31 PROCEDURE — 36415 COLL VENOUS BLD VENIPUNCTURE: CPT

## 2019-12-31 PROCEDURE — 77030003601 HC NDL NRV BLK BBMI -A

## 2019-12-31 PROCEDURE — 77030026438 HC STYL ET INTUB CARD -A: Performed by: NURSE ANESTHETIST, CERTIFIED REGISTERED

## 2019-12-31 PROCEDURE — 77030003862 HC BIT DRL SYNT -B: Performed by: ORTHOPAEDIC SURGERY

## 2019-12-31 PROCEDURE — 77030000032 HC CUF TRNQT ZIMM -B: Performed by: ORTHOPAEDIC SURGERY

## 2019-12-31 PROCEDURE — A4565 SLINGS: HCPCS

## 2019-12-31 PROCEDURE — 84703 CHORIONIC GONADOTROPIN ASSAY: CPT

## 2019-12-31 PROCEDURE — 74011250636 HC RX REV CODE- 250/636

## 2019-12-31 PROCEDURE — 77030018673: Performed by: ORTHOPAEDIC SURGERY

## 2019-12-31 PROCEDURE — 74011000250 HC RX REV CODE- 250: Performed by: NURSE ANESTHETIST, CERTIFIED REGISTERED

## 2019-12-31 PROCEDURE — 76210000016 HC OR PH I REC 1 TO 1.5 HR: Performed by: ORTHOPAEDIC SURGERY

## 2019-12-31 PROCEDURE — 77030008462 HC STPLR SKN PROX J&J -A: Performed by: ORTHOPAEDIC SURGERY

## 2019-12-31 PROCEDURE — A4565 SLINGS: HCPCS | Performed by: ORTHOPAEDIC SURGERY

## 2019-12-31 PROCEDURE — 77030040922 HC BLNKT HYPOTHRM STRY -A

## 2019-12-31 PROCEDURE — 77030033067 HC SUT PDO STRATFX SPIR J&J -B: Performed by: ORTHOPAEDIC SURGERY

## 2019-12-31 PROCEDURE — 77030008684 HC TU ET CUF COVD -B: Performed by: NURSE ANESTHETIST, CERTIFIED REGISTERED

## 2019-12-31 PROCEDURE — 77030019908 HC STETH ESOPH SIMS -A: Performed by: NURSE ANESTHETIST, CERTIFIED REGISTERED

## 2019-12-31 PROCEDURE — 64415 NJX AA&/STRD BRCH PLXS IMG: CPT

## 2019-12-31 PROCEDURE — 74011250636 HC RX REV CODE- 250/636: Performed by: ORTHOPAEDIC SURGERY

## 2019-12-31 PROCEDURE — 77030018836 HC SOL IRR NACL ICUM -A: Performed by: ORTHOPAEDIC SURGERY

## 2019-12-31 PROCEDURE — 76010000133 HC OR TIME 3 TO 3.5 HR: Performed by: ORTHOPAEDIC SURGERY

## 2019-12-31 PROCEDURE — 77030028224 HC PDNG CST BSNM -A: Performed by: ORTHOPAEDIC SURGERY

## 2019-12-31 PROCEDURE — 76210000026 HC REC RM PH II 1 TO 1.5 HR: Performed by: ORTHOPAEDIC SURGERY

## 2019-12-31 PROCEDURE — 74011250636 HC RX REV CODE- 250/636: Performed by: ANESTHESIOLOGY

## 2019-12-31 PROCEDURE — 76060000037 HC ANESTHESIA 3 TO 3.5 HR: Performed by: ORTHOPAEDIC SURGERY

## 2019-12-31 PROCEDURE — 77030031139 HC SUT VCRL2 J&J -A: Performed by: ORTHOPAEDIC SURGERY

## 2019-12-31 PROCEDURE — 74011250637 HC RX REV CODE- 250/637: Performed by: ORTHOPAEDIC SURGERY

## 2019-12-31 DEVICE — SCREW BNE L20MM DIA4.5MM PROX CORT TIB S STL ST LOK FULL: Type: IMPLANTABLE DEVICE | Site: HUMERUS | Status: FUNCTIONAL

## 2019-12-31 DEVICE — SCREW BNE L24MM DIA4.5MM PROX CORT TIB S STL ST LOK FULL: Type: IMPLANTABLE DEVICE | Site: HUMERUS | Status: FUNCTIONAL

## 2019-12-31 DEVICE — SCREW BNE L26MM DIA4.5MM PROX CORT TIB S STL ST LOK FULL: Type: IMPLANTABLE DEVICE | Site: HUMERUS | Status: FUNCTIONAL

## 2019-12-31 DEVICE — SCREW BNE L20MM DIA3.5MM CORT S STL ST NONCANNULATED LOK: Type: IMPLANTABLE DEVICE | Site: HUMERUS | Status: FUNCTIONAL

## 2019-12-31 DEVICE — PLATE BNE W13.5XL188MM THK4.2MM 10 H BILAT S STL NAR LOK: Type: IMPLANTABLE DEVICE | Site: HUMERUS | Status: FUNCTIONAL

## 2019-12-31 RX ORDER — LIDOCAINE HYDROCHLORIDE 20 MG/ML
INJECTION, SOLUTION EPIDURAL; INFILTRATION; INTRACAUDAL; PERINEURAL AS NEEDED
Status: DISCONTINUED | OUTPATIENT
Start: 2019-12-31 | End: 2019-12-31 | Stop reason: HOSPADM

## 2019-12-31 RX ORDER — SODIUM CHLORIDE 0.9 % (FLUSH) 0.9 %
5-40 SYRINGE (ML) INJECTION AS NEEDED
Status: DISCONTINUED | OUTPATIENT
Start: 2019-12-31 | End: 2019-12-31 | Stop reason: HOSPADM

## 2019-12-31 RX ORDER — ROPIVACAINE HYDROCHLORIDE 5 MG/ML
INJECTION, SOLUTION EPIDURAL; INFILTRATION; PERINEURAL AS NEEDED
Status: DISCONTINUED | OUTPATIENT
Start: 2019-12-31 | End: 2019-12-31 | Stop reason: HOSPADM

## 2019-12-31 RX ORDER — HYDROMORPHONE HYDROCHLORIDE 2 MG/ML
INJECTION, SOLUTION INTRAMUSCULAR; INTRAVENOUS; SUBCUTANEOUS AS NEEDED
Status: DISCONTINUED | OUTPATIENT
Start: 2019-12-31 | End: 2019-12-31 | Stop reason: HOSPADM

## 2019-12-31 RX ORDER — ROCURONIUM BROMIDE 10 MG/ML
INJECTION, SOLUTION INTRAVENOUS AS NEEDED
Status: DISCONTINUED | OUTPATIENT
Start: 2019-12-31 | End: 2019-12-31 | Stop reason: HOSPADM

## 2019-12-31 RX ORDER — ASPIRIN 325 MG
325 TABLET ORAL 2 TIMES DAILY
Qty: 30 TAB | Refills: 0 | Status: SHIPPED | OUTPATIENT
Start: 2019-12-31 | End: 2020-07-09

## 2019-12-31 RX ORDER — PROPOFOL 10 MG/ML
INJECTION, EMULSION INTRAVENOUS AS NEEDED
Status: DISCONTINUED | OUTPATIENT
Start: 2019-12-31 | End: 2019-12-31 | Stop reason: HOSPADM

## 2019-12-31 RX ORDER — DIPHENHYDRAMINE HYDROCHLORIDE 50 MG/ML
12.5 INJECTION, SOLUTION INTRAMUSCULAR; INTRAVENOUS AS NEEDED
Status: DISCONTINUED | OUTPATIENT
Start: 2019-12-31 | End: 2019-12-31 | Stop reason: HOSPADM

## 2019-12-31 RX ORDER — SODIUM CHLORIDE 0.9 % (FLUSH) 0.9 %
5-40 SYRINGE (ML) INJECTION EVERY 8 HOURS
Status: DISCONTINUED | OUTPATIENT
Start: 2019-12-31 | End: 2019-12-31 | Stop reason: HOSPADM

## 2019-12-31 RX ORDER — NALOXONE HYDROCHLORIDE 0.4 MG/ML
0.04 INJECTION, SOLUTION INTRAMUSCULAR; INTRAVENOUS; SUBCUTANEOUS
Status: DISCONTINUED | OUTPATIENT
Start: 2019-12-31 | End: 2019-12-31 | Stop reason: HOSPADM

## 2019-12-31 RX ORDER — SODIUM CHLORIDE, SODIUM LACTATE, POTASSIUM CHLORIDE, CALCIUM CHLORIDE 600; 310; 30; 20 MG/100ML; MG/100ML; MG/100ML; MG/100ML
125 INJECTION, SOLUTION INTRAVENOUS CONTINUOUS
Status: DISCONTINUED | OUTPATIENT
Start: 2019-12-31 | End: 2019-12-31 | Stop reason: HOSPADM

## 2019-12-31 RX ORDER — HYDROMORPHONE HYDROCHLORIDE 1 MG/ML
.25-1 INJECTION, SOLUTION INTRAMUSCULAR; INTRAVENOUS; SUBCUTANEOUS
Status: DISCONTINUED | OUTPATIENT
Start: 2019-12-31 | End: 2019-12-31 | Stop reason: HOSPADM

## 2019-12-31 RX ORDER — SUCCINYLCHOLINE CHLORIDE 20 MG/ML
INJECTION INTRAMUSCULAR; INTRAVENOUS AS NEEDED
Status: DISCONTINUED | OUTPATIENT
Start: 2019-12-31 | End: 2019-12-31 | Stop reason: HOSPADM

## 2019-12-31 RX ORDER — FENTANYL CITRATE 50 UG/ML
INJECTION, SOLUTION INTRAMUSCULAR; INTRAVENOUS AS NEEDED
Status: DISCONTINUED | OUTPATIENT
Start: 2019-12-31 | End: 2019-12-31 | Stop reason: HOSPADM

## 2019-12-31 RX ORDER — LIDOCAINE HYDROCHLORIDE 10 MG/ML
0.1 INJECTION, SOLUTION EPIDURAL; INFILTRATION; INTRACAUDAL; PERINEURAL AS NEEDED
Status: DISCONTINUED | OUTPATIENT
Start: 2019-12-31 | End: 2019-12-31 | Stop reason: HOSPADM

## 2019-12-31 RX ORDER — ONDANSETRON 8 MG/1
4 TABLET, ORALLY DISINTEGRATING ORAL
Qty: 30 TAB | Refills: 0 | Status: SHIPPED | OUTPATIENT
Start: 2019-12-31 | End: 2020-07-09

## 2019-12-31 RX ORDER — ONDANSETRON 2 MG/ML
INJECTION INTRAMUSCULAR; INTRAVENOUS AS NEEDED
Status: DISCONTINUED | OUTPATIENT
Start: 2019-12-31 | End: 2019-12-31 | Stop reason: HOSPADM

## 2019-12-31 RX ORDER — HYDROCODONE BITARTRATE AND ACETAMINOPHEN 5; 325 MG/1; MG/1
1-2 TABLET ORAL
Qty: 43 TAB | Refills: 0 | Status: SHIPPED | OUTPATIENT
Start: 2019-12-31 | End: 2020-01-07

## 2019-12-31 RX ORDER — MIDAZOLAM HYDROCHLORIDE 1 MG/ML
INJECTION, SOLUTION INTRAMUSCULAR; INTRAVENOUS AS NEEDED
Status: DISCONTINUED | OUTPATIENT
Start: 2019-12-31 | End: 2019-12-31 | Stop reason: HOSPADM

## 2019-12-31 RX ORDER — FLUMAZENIL 0.1 MG/ML
0.2 INJECTION INTRAVENOUS
Status: DISCONTINUED | OUTPATIENT
Start: 2019-12-31 | End: 2019-12-31 | Stop reason: HOSPADM

## 2019-12-31 RX ORDER — DEXAMETHASONE SODIUM PHOSPHATE 4 MG/ML
INJECTION, SOLUTION INTRA-ARTICULAR; INTRALESIONAL; INTRAMUSCULAR; INTRAVENOUS; SOFT TISSUE AS NEEDED
Status: DISCONTINUED | OUTPATIENT
Start: 2019-12-31 | End: 2019-12-31 | Stop reason: HOSPADM

## 2019-12-31 RX ORDER — HYDROCODONE BITARTRATE AND ACETAMINOPHEN 5; 325 MG/1; MG/1
1 TABLET ORAL
Status: DISCONTINUED | OUTPATIENT
Start: 2019-12-31 | End: 2019-12-31 | Stop reason: HOSPADM

## 2019-12-31 RX ORDER — EPHEDRINE SULFATE/0.9% NACL/PF 50 MG/5 ML
SYRINGE (ML) INTRAVENOUS AS NEEDED
Status: DISCONTINUED | OUTPATIENT
Start: 2019-12-31 | End: 2019-12-31 | Stop reason: HOSPADM

## 2019-12-31 RX ORDER — CEFAZOLIN SODIUM 1 G/3ML
INJECTION, POWDER, FOR SOLUTION INTRAMUSCULAR; INTRAVENOUS AS NEEDED
Status: DISCONTINUED | OUTPATIENT
Start: 2019-12-31 | End: 2019-12-31

## 2019-12-31 RX ADMIN — HYDROCODONE BITARTRATE AND ACETAMINOPHEN 1 TABLET: 5; 325 TABLET ORAL at 12:37

## 2019-12-31 RX ADMIN — SUCCINYLCHOLINE CHLORIDE 160 MG: 20 INJECTION, SOLUTION INTRAMUSCULAR; INTRAVENOUS; PARENTERAL at 07:47

## 2019-12-31 RX ADMIN — PHENYLEPHRINE HYDROCHLORIDE 80 MCG: 10 INJECTION INTRAVENOUS at 08:10

## 2019-12-31 RX ADMIN — PHENYLEPHRINE HYDROCHLORIDE 40 MCG: 10 INJECTION INTRAVENOUS at 08:28

## 2019-12-31 RX ADMIN — ROPIVACAINE HYDROCHLORIDE 30 ML: 5 INJECTION, SOLUTION EPIDURAL; INFILTRATION; PERINEURAL at 07:18

## 2019-12-31 RX ADMIN — PHENYLEPHRINE HYDROCHLORIDE 60 MCG/MIN: 10 INJECTION INTRAVENOUS at 09:07

## 2019-12-31 RX ADMIN — CEFAZOLIN 0.3 G: 1 INJECTION, POWDER, FOR SOLUTION INTRAMUSCULAR; INTRAVENOUS; PARENTERAL at 06:43

## 2019-12-31 RX ADMIN — PHENYLEPHRINE HYDROCHLORIDE 40 MCG: 10 INJECTION INTRAVENOUS at 08:31

## 2019-12-31 RX ADMIN — PROPOFOL 180 MG: 10 INJECTION, EMULSION INTRAVENOUS at 07:47

## 2019-12-31 RX ADMIN — PHENYLEPHRINE HYDROCHLORIDE 40 MCG: 10 INJECTION INTRAVENOUS at 08:54

## 2019-12-31 RX ADMIN — SODIUM CHLORIDE, SODIUM LACTATE, POTASSIUM CHLORIDE, AND CALCIUM CHLORIDE 125 ML/HR: 600; 310; 30; 20 INJECTION, SOLUTION INTRAVENOUS at 06:39

## 2019-12-31 RX ADMIN — PHENYLEPHRINE HYDROCHLORIDE 60 MCG: 10 INJECTION INTRAVENOUS at 08:36

## 2019-12-31 RX ADMIN — FENTANYL CITRATE 100 MCG: 50 INJECTION, SOLUTION INTRAMUSCULAR; INTRAVENOUS at 07:47

## 2019-12-31 RX ADMIN — HYDROMORPHONE HYDROCHLORIDE 0.5 MG: 2 INJECTION INTRAMUSCULAR; INTRAVENOUS; SUBCUTANEOUS at 09:17

## 2019-12-31 RX ADMIN — MIDAZOLAM 2 MG: 1 INJECTION INTRAMUSCULAR; INTRAVENOUS at 07:08

## 2019-12-31 RX ADMIN — ROCURONIUM BROMIDE 25 MG: 50 INJECTION, SOLUTION INTRAVENOUS at 07:56

## 2019-12-31 RX ADMIN — ONDANSETRON 4 MG: 2 INJECTION INTRAMUSCULAR; INTRAVENOUS at 10:30

## 2019-12-31 RX ADMIN — FENTANYL CITRATE 50 MCG: 50 INJECTION, SOLUTION INTRAMUSCULAR; INTRAVENOUS at 07:08

## 2019-12-31 RX ADMIN — LIDOCAINE HYDROCHLORIDE 80 MG: 20 INJECTION, SOLUTION INTRAVENOUS at 07:46

## 2019-12-31 RX ADMIN — PHENYLEPHRINE HYDROCHLORIDE 80 MCG: 10 INJECTION INTRAVENOUS at 09:06

## 2019-12-31 RX ADMIN — PHENYLEPHRINE HYDROCHLORIDE 80 MCG: 10 INJECTION INTRAVENOUS at 08:07

## 2019-12-31 RX ADMIN — PHENYLEPHRINE HYDROCHLORIDE 80 MCG: 10 INJECTION INTRAVENOUS at 08:58

## 2019-12-31 RX ADMIN — HYDROMORPHONE HYDROCHLORIDE 0.5 MG: 1 INJECTION, SOLUTION INTRAMUSCULAR; INTRAVENOUS; SUBCUTANEOUS at 11:10

## 2019-12-31 RX ADMIN — FENTANYL CITRATE 50 MCG: 50 INJECTION, SOLUTION INTRAMUSCULAR; INTRAVENOUS at 07:11

## 2019-12-31 RX ADMIN — PHENYLEPHRINE HYDROCHLORIDE 80 MCG: 10 INJECTION INTRAVENOUS at 07:55

## 2019-12-31 RX ADMIN — PHENYLEPHRINE HYDROCHLORIDE 80 MCG: 10 INJECTION INTRAVENOUS at 08:04

## 2019-12-31 RX ADMIN — ROCURONIUM BROMIDE 5 MG: 50 INJECTION, SOLUTION INTRAVENOUS at 07:46

## 2019-12-31 RX ADMIN — PHENYLEPHRINE HYDROCHLORIDE 120 MCG: 10 INJECTION INTRAVENOUS at 09:02

## 2019-12-31 RX ADMIN — PHENYLEPHRINE HYDROCHLORIDE 40 MCG: 10 INJECTION INTRAVENOUS at 08:25

## 2019-12-31 RX ADMIN — Medication 10 MG: at 09:54

## 2019-12-31 RX ADMIN — DEXAMETHASONE SODIUM PHOSPHATE 8 MG: 4 INJECTION, SOLUTION INTRAMUSCULAR; INTRAVENOUS at 07:56

## 2019-12-31 RX ADMIN — Medication 10 MG: at 09:33

## 2019-12-31 NOTE — PERIOP NOTES
I have reviewed discharge instructions with the patient, parent and sister. The patient, parent and sister verbalized understanding.

## 2019-12-31 NOTE — ANESTHESIA POSTPROCEDURE EVALUATION
Procedure(s):  LEFT HUMERUS OPEN REDUCTION INTERNAL FIXATION (CHOICE ANES). general, regional    Anesthesia Post Evaluation      Multimodal analgesia: multimodal analgesia not used between 6 hours prior to anesthesia start to PACU discharge  Patient location during evaluation: PACU  Patient participation: complete - patient participated  Level of consciousness: awake  Pain management: adequate  Airway patency: patent  Anesthetic complications: no  Cardiovascular status: acceptable, blood pressure returned to baseline and hemodynamically stable  Respiratory status: acceptable  Hydration status: acceptable  Post anesthesia nausea and vomiting:  controlled      Vitals Value Taken Time   /66 12/31/2019 11:45 AM   Temp 36.9 °C (98.5 °F) 12/31/2019 10:52 AM   Pulse 111 12/31/2019 11:53 AM   Resp 24 12/31/2019 11:53 AM   SpO2 100 % 12/31/2019 11:53 AM   Vitals shown include unvalidated device data.

## 2019-12-31 NOTE — DISCHARGE INSTRUCTIONS
Upper Extremity Surgery Discharge Instructions  Evens Fishman. Anya Lala M.D. Please take the time to review the following instructions before you leave the hospital and use them as guidelines during your recovery from surgery. If you have any questions you may contact our office at [phone] ext. [phone]. Wound Care/Dressing Changes:  ____  Leave your dressing in place if stays sealed and ok to shower with dressing in place. Remove ace and reaaply after shower. If dressing becomes saturated remove and exchange for dry gauze dressing. You have staples and they will be removed at your follow up visit. Sling:  ____ Michelle Batters are not required to wear a sling and should do so only as needed for comfort. Need to come out of it once block has worn off for ROM of shoulder, elbow, wrist and hand. Ice/Elevation:  Continue ice and elevation consistently for 48 hours after surgery. Ice should be applied 30-40 minutes at a time, with a 20-30 minute break in between. Please do not put ice directly on bare skin. We recommend putting a towel or cloth to cover the skin. After 48 hours, you should ice 3 times per day for 20 minutes at a time for the next 5 days. After 1 week from surgery, you may use ice and elevation as needed for pain and swelling. Do not put ice directly onto the skin. Please place a towel between the ice and your skin. Physical Therapy:  ____ Begin physical therapy __________days from surgery. Please call the therapy location of your choice for an appointment. ____ Michelle Aguilars do not need to begin physical therapy at this time. Physical therapy will be discussed at your follow-up visit. Diet:  After surgery begin a clear liquid diet and advance to your regular diet as tolerated. Increase your clear liquid intake for the next 2-3 days. Follow up appointment:  Michelle Walsh will need a follow up appointment in 2 weeks from your surgery. Please call our office at [phone] ext.  [phone] to schedule that appointment. Returning to work:  Normally we will keep you out of work until you return for your follow-up appointment after surgery. If you feel you are able to return to work prior to this appointment, please call our office. Any additional time out of work can be discussed at your follow-up appointment          Medications:  1. You will be given a prescription for pain medication when you are discharged. Prescriptions for aspirin and/or nausea will be sent directly to your pharmacy. Please use these as prescribed. Please make sure that you take these medications with food. Pain medication can cause constipation and Colace or Milk of Magnesia may be used as needed. Other possible side effects of pain medication are dizziness, headache, nausea, vomiting, and urinary retention. Discontinue the medication if you develop itching, rash, shortness of breath, or difficulties swallowing. If these symptoms become severe or are not relieved by discontinuing the medication, please seek immediate medical attention. 2. Refills of pain medication are authorized during office hours only: 8am-5pm Monday through Friday. 3. Do not take Tylenol/Acetaminophen in addition to your pain medication as most pain medications already contain this. Do not exceed 4000mg of Tylenol/Acetaminophen per day. 4. You may resume your medication that your were taking prior to surgery. Pain medication may change the effects of any antidepressant medication you may be taking. If you have any questions about possible interactions between your regular medications and the pain medication given, you should consult the physician who prescribes your regular medications. Important Signs and Symptoms:  If any of the following signs or symptoms occurs, you should contact our office.   Please be advised if a problem arises which you feel requires immediate medical attention you should seek immediate medical attention at the closest ER. 1. A sudden increase in swelling and/or redness or warmth at the area your surgery was performed which is not relieved by rest, ice, and elevation. 2. Oral temperature greater than 101 degrees for 12 hours or more that is not relieved by an increase in fluid intake and taking 2 Tylenol every 4-6 hours. 3. Excessive drainage from your incisions or drainage that has not stopped by 72 hours after surgery. 4. Fever, chills, shortness of breath, chest pain, excessive nausea, vomiting, or other signs or symptoms which are of concern to you. If you have any questions or concerns, please contact Dr. Flores Heads office at [phone] ext. [phone]. or through the patient portal at [facility]. DISCHARGE SUMMARY from your Nurse    The following personal items collected during your admission are returned to you:   Dental Appliance: Dental Appliances: None  Vision: Visual Aid: Glasses  Hearing Aid:    Jewelry: Jewelry: Ring(Given to sister)  Clothing: Clothing: (Street clothing to locker)  Other Valuables: Other Valuables: Mary Medrano sent to safe:      PATIENT INSTRUCTIONS:    After general anesthesia or intravenous sedation, for 24 hours or while taking prescription Narcotics:  · Limit your activities  · Do not drive and operate hazardous machinery  · Do not make important personal or business decisions  · Do  not drink alcoholic beverages  · If you have not urinated within 8 hours after discharge, please contact your surgeon on call.     Report the following to your surgeon:  · Excessive pain, swelling, redness or odor of or around the surgical area  · Temperature over 100.5  · Nausea and vomiting lasting longer than 4 hours or if unable to take medications  · Any signs of decreased circulation or nerve impairment to extremity: change in color, persistent  numbness, tingling, coldness or increase pain  · Any questions    COUGH AND DEEP BREATHE    Breathing deep and coughing are very important exercises to do after surgery. Deep breathing and coughing open the little air tubes and air sacks in your lungs. You take deep breaths every day. You may not even notice - it is just something you do when you sigh or yawn. It is a natural exercise you do to keep these air passages open. After surgery, take deep breaths and cough, on purpose. Coughing and deep breathing help prevent bronchitis and pneumonia after surgery. If you had chest or belly surgery, use a pillow as a \"hug chester\" and hold it tightly to your chest or belly when you cough. DIRECTIONS:  6. Take 10 to 15 slow deep breaths every hour while awake. 7. Breathe in deeply, and hold it for 2 seconds. 8. Exhale slowly through puckered lips, like blowing up a balloon. 9. After every 4th or 5th deep breath, hug your pillow to your chest or belly and give a hard, deep cough. Yes, it will probably hurt. But doing this exercise is very important part of healing after surgery. Take your pain medicine to help you do this exercise without too much pain. IF YOU HAVE BEEN DIAGNOSED WITH SLEEP APNEA, PLEASE USE YOUR SLEEP APNEA DEVICE OR CPAP MACHINE WHEN YOU INTEND TO NAP AFTER TAKING PAIN MEDICATION. Ankle Pumps    Ankle pumps increase the circulation of oxygenated blood to your lower extremities and decrease your risk for circulation problems such as blood clots. They also stretch the muscles, tendons and ligaments in your foot and ankle, and prevent joint contracture in the ankle and foot, especially after surgeries on the legs. It is important to do ankle pump exercises regularly after surgery because immobility increases your risk for developing a blood clot. Your doctor may also have you take an Aspirin for the next few days as well. If your doctor did not ask you to take an Aspirin, consult with him before starting Aspirin therapy on your own.       Slowly point your foot forward, feeling the muscles on the top of your lower leg stretch, and hold this position for 5 seconds. Next, pull your foot back toward you as far as possible, stretching the calf muscles, and hold that position for 5 seconds. Repeat with the other foot. Perform 10 repetitions every hour while awake for both ankles if possible (down and then up with the foot once is one repetition). You should feel gentle stretching of the muscles in your lower leg when doing this exercise. If you feel pain, or your range of motion is limited, don't  Push too hard. Only go the limit your joint and muscles will let you go. If you have increasing pain, progressively worsening leg warmth or swelling, STOP the exercise and call your doctor. Below is information about the medications your doctor is prescribing after your visit:    Other information in your discharge envelope:  []     PRESCRIPTIONS  []     PHYSICAL THERAPY PRESCRIPTION  []     APPOINTMENT CARDS  []     Regional Anesthesia Pamphlet for block or block with On-Q Catheter from Anesthesia Service  []     Medical device information sheets/pamphlets from their    []     School/work excuse note. []     /parent work excuse note. These are general instructions for a healthy lifestyle:    *  Please give a list of your current medications to your Primary Care Provider. *  Please update this list whenever your medications are discontinued, doses are      changed, or new medications (including over-the-counter products) are added. *  Please carry medication information at all times in case of emergency situations. About Smoking  No smoking / No tobacco products / Avoid exposure to second hand smoke    Surgeon General's Warning:  Quitting smoking now greatly reduces serious risk to your health. Obesity, smoking, and sedentary lifestyle greatly increases your risk for illness and disease.   A healthy diet, regular physical exercise & weight monitoring are important for maintaining a healthy lifestyle. Congestive Heart Failure  You may be retaining fluid if you have a history of heart failure or if you experience any of the following symptoms:  Weight gain of 3 pounds or more overnight or 5 pounds in a week, increased swelling in our hands or feet or shortness of breath while lying flat in bed. Please call your doctor as soon as you notice any of these symptoms; do not wait until your next office visit. [facility] MEDICATION AND SIDE EFFECT GUIDE    The [facility] MEDICATION AND SIDE EFFECT GUIDE was provided to the PATIENT AND CARE PROVIDER.   Information provided includes instruction about drug purpose and common side effects for the following medications:    · norco  · zofran  · aspirin

## 2019-12-31 NOTE — ANESTHESIA PROCEDURE NOTES
Peripheral Block    Start time: 12/31/2019 7:08 AM  End time: 12/31/2019 7:18 AM  Performed by: Dorothy Gamble CRNA  Authorized by: Bailee Smith MD       Pre-procedure: Indications: at surgeon's request and post-op pain management    Preanesthetic Checklist: patient identified, risks and benefits discussed, site marked, timeout performed, anesthesia consent given and patient being monitored    Timeout Time: 07:08          Block Type:   Block Type:   Interscalene  Laterality:  Left  Monitoring:  Continuous pulse ox, frequent vital sign checks, heart rate, responsive to questions and oxygen  Injection Technique:  Single shot  Procedures: ultrasound guided    Patient Position: supine  Prep: chlorhexidine    Location:  Interscalene  Needle Type:  Stimuplex  Needle Gauge:  22 G  Needle Localization:  Nerve stimulator and ultrasound guidance    Assessment:  Number of attempts:  1  Injection Assessment:  Incremental injection every 5 mL, local visualized surrounding nerve on ultrasound, negative aspiration for blood, no paresthesia and no intravascular symptoms  Patient tolerance:  Patient tolerated the procedure well with no immediate complications

## 2019-12-31 NOTE — BRIEF OP NOTE
BRIEF OPERATIVE NOTE    Date of Procedure: 12/31/2019   Preoperative Diagnosis: CLOSED DISPLACED COMMINUTED FRACTURE OF SHAFT OF LEFT HUMERUS, INITIAL ENCOUNTER  Postoperative Diagnosis: CLOSED DISPLACED COMMINUTED FRACTURE OF SHAFT OF LEFT HUMERUS, INITIAL ENCOUNTER    Procedure(s):  LEFT HUMERUS OPEN REDUCTION INTERNAL FIXATION (CHOICE ANES)  Surgeon(s) and Role:     Starlett Leyden, MD - Primary         Surgical Assistant: Yandy Florian    Surgical Staff:  Circ-Maulik Chávez RN  Circ-Relief: Narendra Bains RN  Scrub Tech-1: Tulio Land  Surg Asst-1: Ale Telles  Event Time In Time Out   Incision Start 2308    Incision Close 1032      Anesthesia: Other   Estimated Blood Loss: 200  Specimens: * No specimens in log *   Findings: left displaced comminuted humeral shaft fracture, radial nerve intact but encased in callus/scar, ulnar nerve and radial nerve intact at end of surgery, radial nerve laying between 2nd and 3rd holes from proximal end of plate   Complications: none immediate from surgery, inguinal and labial breakdown noted on admission with retained tissue paper in labia  Implants:   Implant Name Type Inv.  Item Serial No.  Lot No. LRB No. Used Action   SCR BNE CRTX ST HEX 3.5X20MM -- SS - SNA  SCR BNE CRTX ST HEX 3.5X20MM -- SS NA SYNTHES [place] NA Left 2 Implanted   PLATE DIAMOND LCP 80Q [id number] --  - SNA  PLATE DIAMOND LCP 79J [id number] --  NA SYNTHES [place] NA Left 1 Implanted   SCR BNE CRTX ST 4.5X24MM SS --  - SNA  SCR BNE CRTX ST 4.5X24MM SS --  NA SYNTHES [place] NA Left 1 Implanted   SCR BNE CRTX ST 4.5X20MM SS --  - SNA  SCR BNE CRTX ST 4.5X20MM SS --  NA SYNTHES [place] NA Left 3 Implanted   SCR BNE CRTX ST 4.5X26MM SS --  - SNA  SCR BNE CRTX ST 4.5X26MM SS --  NA SYNTHES [place] NA Left 2 Implanted

## 2019-12-31 NOTE — ANESTHESIA PREPROCEDURE EVALUATION
Relevant Problems   No relevant active problems       Anesthetic History   No history of anesthetic complications            Review of Systems / Medical History  Patient summary reviewed, nursing notes reviewed and pertinent labs reviewed    Pulmonary  Within defined limits                 Neuro/Psych   Within defined limits  seizures: well controlled    Neuromuscular disease     Cardiovascular  Within defined limits                Exercise tolerance: >4 METS     GI/Hepatic/Renal  Within defined limits              Endo/Other  Within defined limits      Morbid obesity     Other Findings   Comments: MS - walks with cane           Physical Exam    Airway  Mallampati: II    Neck ROM: normal range of motion   Mouth opening: Normal     Cardiovascular  Regular rate and rhythm,  S1 and S2 normal,  no murmur, click, rub, or gallop  Rhythm: regular  Rate: normal         Dental  No notable dental hx       Pulmonary  Breath sounds clear to auscultation               Abdominal  GI exam deferred       Other Findings            Anesthetic Plan    ASA: 3  Anesthesia type: general and regional - interscalene block          Induction: Intravenous  Anesthetic plan and risks discussed with: Patient      Long discussion with patient regarding risk of MS flare with general anesthesia and nerve block. Surgeon involved in discussion with patient about risk of nerve injury with surgery as well. Pt will be lateral or prone for the surgery. Pt would like general anesthesia with a nerve block.

## 2019-12-31 NOTE — OP NOTES
OPERATIVE REPORT     Admit Date: 12/31/2019  Admit Diagnosis: CLOSED DISPLACED COMMINUTED FRACTURE OF SHAFT OF LEFT HUMERUS, INITIAL ENCOUNTER  Date of Procedure: 12/31/2019   Preoperative Diagnosis: CLOSED DISPLACED COMMINUTED FRACTURE OF SHAFT OF LEFT HUMERUS, INITIAL ENCOUNTER  Postoperative Diagnosis: CLOSED DISPLACED COMMINUTED FRACTURE OF SHAFT OF LEFT HUMERUS, INITIAL ENCOUNTER    Procedure: Procedure(s):  LEFT HUMERUS OPEN REDUCTION INTERNAL FIXATION (CHOICE ANES)  Surgeon: Tres Wagner MD  Assistant(s): Livia Mojicar, [place]  Anesthesia: Other   Tourniquet Time: None  Estimated Blood Loss: 200 cc  Specimens: * No specimens in log *   Complications: none immediate from surgery, inguinal and labial breakdown noted on admission with retained tissue paper in labia       INDICATIONS:     The patient is a 46 y.o.  female who presented with a left Minimally comminuted distal 3rd humerus shaft fracture that after a long conversation regarding non-operative versus operative treatment was felt ot be appropriate for surgical intervention  and patient wished to move forward with surgery. We specifically discussed the risk of non-operative management to include malunion/nonunion / prolonged time or healing /  stiffness of the elbow a or shoulder versus risks of operative intervention that include risks of bleeding, infection, damage to surrounding structures specifically the radial nerve, nonunion/malunion, further fracture, persistent pain, stiffness, and loss of function, risks of anesthesia, and other risks, the patient elected to proceed with the above procedure and signed operative consent. DESCRIPTION OF PROCEDURE:  The patient was seen and identified in the preanesthesia care unit. The operative site was marked. All preoperative questions were answered. Risks and benefits of the procedure were again reviewed.   The patient was then evaluated by the anesthesia team and  They along with the patient elected to move forward with left supraclavicular block for pain control. We discussed with patient that this would prevent immediate evaluation for possible nerve palsy but would help significantly pain she elected to move forward with the block. Once the block was in place brought to the operative suite  And general endotracheal anesthesia was induced. Once properly anesthetized patient was transferred to the table and placed in the prone position with appropriately padding and support of the chest as well as the face and eyes. .  The patient was then prepped and draped in the usual sterile fashion. Preoperative antibiotic was given. A timeout was completed confirming the appropriate site, side, and procedure. DVT prophylaxis was not needed intraoperatively secondary to the duration of the case. A standard   Direct posteriorapproach was performed. We kept full-thickness flaps incising through the  Posterior tricepsfascia and encountering the triceps. We then with blunt dissection dissected out the triceps 1st starting medially mobilizing the medial head during this we did expose the ulnar nerve and protected this throughout the duration the procedure with a vessel loop. Once we felt we'd adequately mobilized the medial side of the triceps we worked laterally in the process of moving mobilizing the triceps off of the intermuscular septum as well as the posterior aspect of the humeral shaft we did encounter the radial nerve. We then dissected out the radial nerve with mets scissors and  Blunt dissection. The nerve was noted to be already in capsulated and callus/scar it was freed of this. Once we had adequately mobilized the radial nerve as well as the lateral attachments of the triceps we were able to make a medial lateral windows to visualize our fracture.   Once we adequately exposed our fracture site we cleaned the fracture of any interposed hematoma /callus or soft tissue and then performed a reduction with the help of reduction clamps   Turning our B type fracture into a a type fracture by reducing the butterfly fragment to the distal segment once this was in anatomic reduction we then placed a interfragmentary screw in standard technique with 3.5 mm cortical screw from a medial to lateral aspect perpendicular to the fracture site. Once this was in 1 segment we then reapproximated this to our proximal humeral shaft and once this was confirmed to be anatomic we placed an additional interfragmentary screw perpendicular to the fracture site giving us overall reconstitution our anatomic humeral anatomy. This was confirmed under direct visualization as well as on fluoroscopy. Next an appropriately sized  4.5mm narrow LC DC plate that was pre contoured  Was applied to the posterior aspect of the humerus underneath the radial nerve the radial nerve crossed at the junction between the 2nd and 3rd holes from the proximal aspect of the plate. This was secured in place initially with a clamp to confirm positioning  under fluoroscopy and then the plate was secured to the bone with a proximal and distal screw and once the plate was confirmed to be in appropriate position the residual screws were placed into the plate in a standard AO technique obtaining 6 points of fixation proximally and  distally. Provisional fixation was then removed. Satisfactory reduction was then confirmed using fluoroscopy, and multiple views were taken insuring appropriate hardware position and alignment with acceptable reduction and positioning of the fracture. We then evaluated both our ulnar and radial nerves both of which were still intact without obvious damage protecting vessel loops were removed. The wound was irrigated. The deep fascial /muscle belly layer was closed  with #2 Stratafix in a running fashion. Vicryl and then the subcutaneous tissue was closed with Vicryl 2-0. We then re approximated the skin with staples.  Sterile dressing was applied. Patient was then  Taking out of the prone position and medically stabilized once appropriately stabilize was transferred to the recovery room in stable condition. Postoperative care: The patient will be no weight-bearing greater than 10 lbs to the operative extremity. Okay for range of motion  of the shoulder and elbow     Ice and elevation the operative extremity     Sling just for comfort to come out of the sling and work on range of motion and elevate    The patient will return to my clinic in 2-3 weeks for the 1st postoperative visit. Kishore Myles.  Pricila Saavedra MD

## 2019-12-31 NOTE — H&P
Date of Surgery Update:  Della Pina was seen and examined. History and physical has been reviewed. The patient has been examined. There have been no significant clinical changes since the completion of the originally dated History and Physical.  Patient identified by surgeon; surgical site was confirmed by patient and surgeon. Discussed risks and benefits of nerve block with her underlying MS and also the aspect of delayed presentation of radial nerve palsy if one is sustained at time of the surgery from operating around radial nerve. Signed By: Noah Suero MD     December 31, 2019 7:12 AM         Progress Notes          CARE TEAM:  Patient Care Team:  Rell Friend MD as PCP - General (Family Medicine)     ASSESSMENT  1. Closed displaced comminuted fracture of shaft of left humerus, initial encounter       There is no problem list on file for this patient.        PLAN  Treatment Plan:  44-year-old right-hand-dominant female with MS status post ground level fall on 12/21/2019 resulting in a left distal 3rd humeral shaft fracture with butterfly fragment.     We reviewed her x-rays and CT scan as well as her clinical diagnosis. This time would recommend moving forward with left humerus open reduction internal fixation     We at length discuss the risks and benefits which include but not limited to infection, wound issues, damage to nearby structures specifically including the ulnar and radial nerves resulting in weakness or numbness in the distal forearm requiring possible bracing or further management, nonunion, malunion, hardware failure, painful hardware, need for further surgeries, DVT/PE, decreased range of motion, persistent pain or dissatisfaction along with the associated issues of anesthesia with possible stroke heart attack or death.     She understands all these risks and benefits and wishes to move forward surgery.   We'll plan to do her surgery at the hospital with possible same-day discharge versus overnight admission and observation.     Follow Up:  2 weeks postop for wound check and two view x-rays left humerus         Orders Placed This Encounter    BMI >=25 PATIENT INSTRUCTIONS & EDUCATION    BP Elevated Patient Education & Instructions    oxyCODONE-acetaminophen (PERCOCET) 5-325 MG per tablet            HISTORY OF PRESENT ILLNESS  Chief Complaint: Pain and Injury of the Left Upper Arm     History of present illness: Ms. Pino Marquez is a 46 y.o. female who presents today for evaluation of left arm pain. Patient states on 12/21/2019 she tripped and fell on the rug resulting in left arm pain and deformity [facility] obtained was diagnosed with a left humeral shaft fracture was sent to the ER at [facility] for further evaluation management patient was placed into a coaptation splint and had a CT scan obtained with plan for outpatient follow-up. She denies any previous history of issues with the left arm. Denies any previous surgery. Has been compliant with nonweightbearing and kept the splint in place. Denies any distal numbness or weakness.     OBJECTIVE  Constitutional:  No acute distress. Her body mass index is 40.72 kg/m². Eyes:  Sclera are nonicteric. Respiratory:  No labored breathing. No use of accessory muscles. Cardiovascular:  Regular rate and rhythm. Good peripheral perfusion. No marked edema. Skin:  No marked skin ulcers. Neurological:  No marked sensory loss noted. Psychiatric: Alert and oriented x3.     Musculoskeletal    Left upper extremity:  Patient has a large upper arm with a coaptation and posterior slab splint in place. No obvious signs of breakdown around the splint. Distally Motor is intact in median/ulnar/radial/ain/pin  distributions. Sensation is intact in median/ulnar/radial distributions.   Brisk capillary refill.     IMAGING / STUDIES         No imaging obtained       Independent review of x-rays previously obtained on [facility] and available on our system demonstrate a low left humeral shaft fracture at the distal 3rd with segmental butterfly. Does not appear to extend down into the elbow joint.     Independent review of CT scan previously obtained on 12/21 and available on the [facility] system demonstrate a left humeral shaft fracture in the distal 3rd with segmental butterfly without involvement of the elbow joint. No other fractures are appreciated.        PROCEDURES  Procedures         Posting:  Radiolucent flat top table, pizza board, large C-arm, C-armour, Dep[employer]ynthes large frag wide and narrow plates, posterior periarticular plate, large reduction clamps, outpatient possible admission        Note: This chart was prepared using voice-recognition software and may  contain unintended word substitution errors.  An addendum for corrections can be made upon request.       Electronically signed by Chinmay Mendoza MD at 12/27/2019 12:29 PM

## 2020-03-05 ENCOUNTER — OFFICE VISIT (OUTPATIENT)
Dept: NEUROLOGY | Age: 40
End: 2020-03-05

## 2020-03-05 VITALS
OXYGEN SATURATION: 95 % | BODY MASS INDEX: 42.47 KG/M2 | DIASTOLIC BLOOD PRESSURE: 76 MMHG | HEIGHT: 67 IN | HEART RATE: 102 BPM | SYSTOLIC BLOOD PRESSURE: 100 MMHG | WEIGHT: 270.6 LBS | RESPIRATION RATE: 18 BRPM

## 2020-03-05 DIAGNOSIS — R26.9 GAIT ABNORMALITY: ICD-10-CM

## 2020-03-05 DIAGNOSIS — G35 MS (MULTIPLE SCLEROSIS) (HCC): ICD-10-CM

## 2020-03-05 DIAGNOSIS — R76.8: ICD-10-CM

## 2020-03-05 DIAGNOSIS — Z51.81 MEDICATION MONITORING ENCOUNTER: ICD-10-CM

## 2020-03-05 DIAGNOSIS — G35 MS (MULTIPLE SCLEROSIS) (HCC): Primary | ICD-10-CM

## 2020-03-05 RX ORDER — DIMETHYL FUMARATE 240 MG/1
1 CAPSULE ORAL 2 TIMES DAILY
Qty: 180 CAP | Refills: 3 | Status: SHIPPED | OUTPATIENT
Start: 2020-03-05 | End: 2021-02-19

## 2020-03-05 RX ORDER — MELOXICAM 15 MG/1
15 TABLET ORAL
COMMUNITY
Start: 2020-02-10 | End: 2020-07-09

## 2020-03-05 RX ORDER — BETAMETHASONE VALERATE 1.2 MG/G
AEROSOL, FOAM TOPICAL
COMMUNITY
Start: 2020-03-04

## 2020-03-05 NOTE — PROGRESS NOTES
[address] [facility] and [address] at [facility] at [address] [facility], [address] [facility] [address] [facility], [address] [place] [address]   [phone]              Chief Complaint   Patient presents with    Multiple Sclerosis     Current Outpatient Medications   Medication Sig Dispense Refill    meloxicam (MOBIC) 15 mg tablet Take 15 mg by mouth.  dimethyl fumarate (TECFIDERA) 240 mg cpDR Take 1 Cap by mouth two (2) times a day. 180 Cap 3    ondansetron (ZOFRAN ODT) 8 mg disintegrating tablet Take 0.5 Tabs by mouth every eight (8) hours as needed for Nausea. 30 Tab 0    docusate sodium (COLACE) 50 mg capsule Take 1 Cap by mouth two (2) times a day for 90 days. 60 Cap 2    ARIPiprazole (ABILIFY) 10 mg tablet TK 1 T PO Daily 30 Tab 5    benztropine (COGENTIN) 0.5 mg tablet TAKE 1 TABLET BY MOUTH TWICE DAILY 60 Tab 5    vortioxetine (TRINTELLIX) 20 mg tablet Take 1 Tab by mouth daily. 30 Tab 5    senna-docusate (PERICOLACE) 8.6-50 mg per tablet Take 1 Tab by mouth two (2) times daily as needed for Constipation. 60 Tab 3    ferrous sulfate 325 mg (65 mg iron) tablet Take 1 Tab by mouth Daily (before breakfast).  betamethasone valerate (LUXIQ) 0.12 % topical foam       aspirin (ASPIRIN) 325 mg tablet Take 1 Tab by mouth two (2) times a day. 30 Tab 0      Allergies   Allergen Reactions    Amoxicillin Hives     **Pt tolerated Ancef graded challenge**    Penicillins Hives     **Pt tolerated Ancef graded challenge**     Social History     Tobacco Use    Smoking status: Never Smoker    Smokeless tobacco: Never Used   Substance Use Topics    Alcohol use: Yes     Comment: occasionally    Drug use: No     Patient returns today for follow-up. She is a 28-year-old woman who was last seen June 2019. She has multiple sclerosis on Tecfidera. She is JOHANN virus positive. Lymphocyte count was adequate.     In the interim since I saw her last she went to the emergency room after falling and tripping over a rug. She had a closed displaced oblique fracture of her left humeral shaft. She subsequently underwent orthopedic follow-up. She had open reduction and internal fixation on 12/31/2019. Today she comes to clinic for routine follow-up. She reports her MS is doing well. No sign of exacerbation. Tolerating Tecfidera without difficulty. No nausea. No vomiting. Eating well. No further fall. No change in her gait. She has not had any MRIs in a year plus now. She has not had her CBC checked. She is JOHANN virus positive. No rash. No chest pain. No palpitations. No shortness of breath. She just finished her last hand therapy appointment right before our appointment. Review of systems  Pertinent positives and negatives as noted with remainder of comprehensive review negative    Review of laboratory analyses finds absolute lymphocyte count from August 15, 2019 at 1.6 x 10 to 3  Examination  Visit Vitals  /76 (BP 1 Location: Left arm, BP Patient Position: Sitting)   Pulse (!) 102   Resp 18   Ht 5' 7\" (1.702 m)   Wt 122.7 kg (270 lb 9.6 oz)   SpO2 95%   BMI 42.38 kg/m²     Pleasant lady. She is overweight. She has appropriate dress and appropriate grooming. Engaging and interactive. No icterus. No edema. She has symmetric pulses. Normal speech-language and cognition. No pronation and no drift. She resists fully in all extremities to direct conversational testing in all muscle groups. Reflexes a bit more brisk left upper and lower than right. No ataxia. Ambulate steadily with a cane    Impression/Plan  Multiple sclerosis and clinically this is doing well. Continue Tecfidera. Update MRI scans looking for any radiographic progression    JOHANN virus positivity with use of Tecfidera.   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 Continue under the direction of orthopedics    We will call her with the results of the MRI scan if they are normal and keep a six-month follow-up. If there has been significant change or evidence of new lesion then we will certainly call her in for sooner appointment to discuss changing her disease modifying therapy    Follow-up as noted    Timothy Liz MD      This note was created using voice recognition software. Despite editing, there may be syntax errors. This note will not be viewable in [address]

## 2020-03-05 NOTE — PATIENT INSTRUCTIONS
How to Get Up Safely After a Fall: Care Instructions Your Care Instructions If you have injuries, health problems, or other reasons that may make it easy for you to fall at home, it is a good idea to learn how to get up safely after a fall. Learning how to get up correctly can help you avoid making an injury worse. Also, knowing what to do if you cannot get up can help you stay safe until help arrives. Follow-up care is a key part of your treatment and safety. Be sure to make and go to all appointments, and call your doctor if you are having problems. It's also a good idea to know your test results and keep a list of the medicines you take. How can you care for yourself after a fall? If you think you can get up First lie still for a few minutes and think about how you feel. If your body feels okay and you think you can get up safely, follow the rest of the steps below: 1. Look for a chair or other piece of furniture that is close to you. 2. Roll onto your side and rest. Roll by turning your head in the direction you want to roll, move your shoulder and arm, then hip and leg in the same direction. 3. Lie still for a moment to let your blood pressure adjust. 
4. Slowly push your upper body up, lift your head, and take a moment to rest. 
5. Slowly get up on your hands and knees, and crawl to the chair or other stable piece of furniture. 6. Put your hands on the chair. 7. Move one foot forward, and place it flat on the floor. Your other leg should be bent with the knee on the floor. 8. Rise slowly, turn your body, and sit in the chair. Stay seated for a bit and think about how you feel. Call for help. Even if you feel okay, let someone know what happened to you. You might not know that you have a serious injury. If you cannot get up 1. If you think you are injured after a fall or you cannot get up, try not to panic. 2. Call out for help. 3. If you have a phone within reach or you have an emergency call device, use it to call for help. 4. If you do not have a phone within reach, try to slide yourself toward it. If you cannot get to the phone, try to slide toward a door or window or a place where you think you can be heard. 5. [place] or use an object to make noise so someone might hear you. 6. If you can reach something that you can use for a pillow, place it under your head. Try to stay warm by covering yourself with a blanket or clothing while you wait for help. When should you call for help? Call 911 anytime you think you may need emergency care. For example, call if: 
  · You passed out (lost consciousness).  
  · You cannot get up after a fall.  
  · You have severe pain.  
 Call your doctor now or seek immediate medical care if: 
  · You have new or worse pain.  
  · You are dizzy or lightheaded.  
  · You hit your head.  
 Watch closely for changes in your health, and be sure to contact your doctor if: 
  · You do not get better as expected. Where can you learn more? Go to http://luis daniel-rashawn.info/. Enter E553 in the search box to learn more about \"How to Get Up Safely After a Fall: Care Instructions. \" Current as of: November 7, 2018 Content Version: 12.2 © 9648-5338 [employer]. Care instructions adapted under license by "[employer] (which disclaims liability or warranty for this information). If you have questions about a medical condition or this instruction, always ask your healthcare professional. [place] [address] any warranty or liability for your use of this information. Preventing Falls: Care Instructions Your Care Instructions Getting around your home safely can be a challenge if you have injuries or health problems that make it easy for you to fall.  Loose rugs and furniture in walkways are among the dangers for many older people who have problems walking or who have poor eyesight. People who have conditions such as arthritis, osteoporosis, or dementia also have to be careful not to fall. You can make your home safer with a few simple measures. Follow-up care is a key part of your treatment and safety. Be sure to make and go to all appointments, and call your doctor if you are having problems. It's also a good idea to know your test results and keep a list of the medicines you take. How can you care for yourself at home? Taking care of yourself · You may get dizzy if you do not drink enough water. To prevent dehydration, drink plenty of fluids, enough so that your urine is light yellow or clear like water. Choose water and other caffeine-free clear liquids. If you have kidney, heart, or liver disease and have to limit fluids, talk with your doctor before you increase the amount of fluids you drink. · Exercise regularly to improve your strength, muscle tone, and balance. Walk if you can. Swimming may be a good choice if you cannot walk easily. · Have your vision and hearing checked each year or any time you notice a change. If you have trouble seeing and hearing, you might not be able to avoid objects and could lose your balance. · Know the side effects of the medicines you take. Ask your doctor or pharmacist whether the medicines you take can affect your balance. Sleeping pills or sedatives can affect your balance. · Limit the amount of alcohol you drink. Alcohol can impair your balance and other senses. · Ask your doctor whether calluses or corns on your feet need to be removed. If you wear loose-fitting shoes because of calluses or corns, you can lose your balance and fall. · Talk to your doctor if you have numbness in your feet. Preventing falls at home · Remove raised doorway thresholds, throw rugs, and clutter. Repair loose carpet or raised areas in the floor. · Move furniture and electrical cords to keep them out of walking paths. · Use nonskid floor wax, and wipe up spills right away, especially on ceramic tile floors. · If you use a walker or cane, put rubber tips on it. If you use crutches, clean the bottoms of them regularly with an abrasive pad, such as steel wool. · Keep your house well lit, especially Cloretta Emily, and outside walkways. Use night-lights in areas such as hallways and bathrooms. Add extra light switches or use remote switches (such as switches that go on or off when you clap your hands) to make it easier to turn lights on if you have to get up during the night. · Install sturdy handrails on stairways. · Move items in your cabinets so that the things you use a lot are on the lower shelves (about waist level). · Keep a cordless phone and a flashlight with new batteries by your bed. If possible, put a phone in each of the main rooms of your house, or carry a cell phone in case you fall and cannot reach a phone. Or, you can wear a device around your neck or wrist. You push a button that sends a signal for help. · Wear low-heeled shoes that fit well and give your feet good support. Use footwear with nonskid soles. Check the heels and soles of your shoes for wear. Repair or replace worn heels or soles. · Do not wear socks without shoes on wood floors. · Walk on the grass when the sidewalks are slippery. If you live in an area that gets snow and ice in the winter, sprinkle salt on slippery steps and sidewalks. Preventing falls in the bath · Install grab bars and nonskid mats inside and outside your shower or tub and near the toilet and sinks. · Use shower chairs and bath benches. · Use a hand-held shower head that will allow you to sit while showering.  
· Get into a tub or shower by putting the weaker leg in first. Get out of a tub or shower with your strong side first. 
 · Repair loose toilet seats and consider installing a raised toilet seat to make getting on and off the toilet easier. · Keep your bathroom door unlocked while you are in the shower. Where can you learn more? Go to http://luis daniel-rashawn.info/. Enter 0476 79 69 71 in the search box to learn more about \"Preventing Falls: Care Instructions. \" Current as of: November 7, 2018 Content Version: 12.2 © 9541-7425 [employer]. Care instructions adapted under license by [employer] (which disclaims liability or warranty for this information). If you have questions about a medical condition or this instruction, always ask your healthcare professional. [address] any warranty or liability for your use of this information.

## 2020-03-10 ENCOUNTER — OFFICE VISIT (OUTPATIENT)
Dept: FAMILY MEDICINE CLINIC | Age: 40
End: 2020-03-10

## 2020-03-10 VITALS
WEIGHT: 264 LBS | BODY MASS INDEX: 41.44 KG/M2 | DIASTOLIC BLOOD PRESSURE: 94 MMHG | HEIGHT: 67 IN | RESPIRATION RATE: 18 BRPM | TEMPERATURE: 96 F | SYSTOLIC BLOOD PRESSURE: 123 MMHG | HEART RATE: 89 BPM

## 2020-03-10 DIAGNOSIS — R03.0 ELEVATED BP WITHOUT DIAGNOSIS OF HYPERTENSION: Primary | ICD-10-CM

## 2020-03-10 DIAGNOSIS — E78.00 HYPERCHOLESTEROLEMIA: ICD-10-CM

## 2020-03-10 NOTE — PROGRESS NOTES
Chief Complaint   Patient presents with    Hypertension     follow up     Arm Injury     broke left arm- wanted to advise PCP

## 2020-03-10 NOTE — PROGRESS NOTES
HISTORY OF PRESENT ILLNESS  Bri De La Fuente is a 46 y.o. female. Rolando Gomez is here to follow up on their elevated blood pressure. This is an intermittent problem. The problem is worse. The symptoms are relieved by nothing. She also needs follow up on her elevated lipids. Review of Systems   Constitutional: Negative for weight loss. No weight gain   Eyes: Negative for blurred vision. Respiratory: Negative for shortness of breath. Cardiovascular: Negative for chest pain and leg swelling. Gastrointestinal: Negative for abdominal pain. Neurological: Negative for dizziness, sensory change, speech change, focal weakness and headaches. Visit Vitals  BP (!) 123/94   Pulse 89   Temp 96 °F (35.6 °C) (Oral)   Resp 18   Ht 5' 7\" (1.702 m)   Wt 264 lb (119.7 kg)   BMI 41.35 kg/m²     BP Readings from Last 3 Encounters:   03/05/20 100/76   12/31/19 116/66   12/22/19 158/84     Physical Exam  Vitals signs and nursing note reviewed. Constitutional:       General: She is not in acute distress. Appearance: She is well-developed. She is not diaphoretic. Cardiovascular:      Rate and Rhythm: Normal rate and regular rhythm. Heart sounds: Normal heart sounds. No murmur. No friction rub. No gallop. Pulmonary:      Effort: Pulmonary effort is normal. No respiratory distress. Breath sounds: Normal breath sounds. No wheezing or rales. Skin:     General: Skin is warm and dry. Neurological:      Mental Status: She is alert and oriented to person, place, and time. ASSESSMENT and PLAN    ICD-10-CM ICD-9-CM    1. Elevated BP without diagnosis of hypertension X94.2 421.6 METABOLIC PANEL, BASIC   2. Hypercholesterolemia E78.00 272.0 LIPID PANEL      HEPATIC FUNCTION PANEL        Blood pressure elevated x 2, well within normal limits otherwise  Continue current plans.     Follow-up and Dispositions    · Return in about 6 months (around 9/10/2020) for blood pressure, bring home blood pressures. Reviewed plan of care. Patient has provided input and agrees with goals.

## 2020-03-11 LAB
BASOPHILS # BLD AUTO: 0.1 X10E3/UL (ref 0–0.2)
BASOPHILS NFR BLD AUTO: 1 %
EOSINOPHIL # BLD AUTO: 0.1 X10E3/UL (ref 0–0.4)
EOSINOPHIL NFR BLD AUTO: 2 %
ERYTHROCYTE [DISTWIDTH] IN BLOOD BY AUTOMATED COUNT: 12.2 % (ref 11.7–15.4)
HCT VFR BLD AUTO: 36 % (ref 34–46.6)
HGB BLD-MCNC: 12.4 G/DL (ref 11.1–15.9)
IMM GRANULOCYTES # BLD AUTO: 0 X10E3/UL (ref 0–0.1)
IMM GRANULOCYTES NFR BLD AUTO: 0 %
LYMPHOCYTES # BLD AUTO: 1.8 X10E3/UL (ref 0.7–3.1)
LYMPHOCYTES NFR BLD AUTO: 27 %
MCH RBC QN AUTO: 28.4 PG (ref 26.6–33)
MCHC RBC AUTO-ENTMCNC: 34.4 G/DL (ref 31.5–35.7)
MCV RBC AUTO: 82 FL (ref 79–97)
MONOCYTES # BLD AUTO: 0.4 X10E3/UL (ref 0.1–0.9)
MONOCYTES NFR BLD AUTO: 6 %
NEUTROPHILS # BLD AUTO: 4.3 X10E3/UL (ref 1.4–7)
NEUTROPHILS NFR BLD AUTO: 64 %
PLATELET # BLD AUTO: 386 X10E3/UL (ref 150–450)
RBC # BLD AUTO: 4.37 X10E6/UL (ref 3.77–5.28)
WBC # BLD AUTO: 6.7 X10E3/UL (ref 3.4–10.8)

## 2020-03-25 ENCOUNTER — TELEPHONE (OUTPATIENT)
Dept: NEUROLOGY | Age: 40
End: 2020-03-25

## 2020-03-25 NOTE — TELEPHONE ENCOUNTER
Q calling from [facility] to see if MRIs are urgent or can be rescheduled. Per Dr. Elizabeth Good' last note it appears she can be rescheduled until the end of May.         Per MD Barbara Almanza, April DERICK, LPN   Caller: Unspecified (Today, 11:48 AM)             Ok to defer MRI

## 2020-04-16 ENCOUNTER — TELEPHONE (OUTPATIENT)
Dept: FAMILY MEDICINE CLINIC | Age: 40
End: 2020-04-16

## 2020-04-16 DIAGNOSIS — F41.9 ANXIETY: ICD-10-CM

## 2020-04-16 RX ORDER — BENZTROPINE MESYLATE 0.5 MG/1
TABLET ORAL
Qty: 60 TAB | Refills: 5 | OUTPATIENT
Start: 2020-04-16

## 2020-04-16 NOTE — TELEPHONE ENCOUNTER
Pt called to ask if Dr. Antony Gann can fill Benztropine (Cogentin) 0.5 mg normally filled by her psychiatrist for restless leg syndrome stating her psychiatrist left [facility] and can no longer fill it. Pt's office notes from NP, [address] [place] [address] in [facility] and pt has been scheduled for virtual visit with Dr. Antony Gann on 4/20/20 at 2:15 pm to discuss refilling this medication. Please call pt if any questions or if Dr. Antony Gann can't refill medication.  Ldm

## 2020-04-17 DIAGNOSIS — F41.9 ANXIETY: ICD-10-CM

## 2020-04-17 RX ORDER — BENZTROPINE MESYLATE 0.5 MG/1
TABLET ORAL
Qty: 180 TAB | OUTPATIENT
Start: 2020-04-17

## 2020-04-17 RX ORDER — BENZTROPINE MESYLATE 0.5 MG/1
TABLET ORAL
Qty: 60 TAB | Refills: 0 | Status: SHIPPED | OUTPATIENT
Start: 2020-04-17 | End: 2020-05-22

## 2020-04-17 NOTE — TELEPHONE ENCOUNTER
Called pt, and left a voice message, advising Dr. Madeline Arias has refilled her medication. Advised pt to call office back with questions or concerns.

## 2020-04-20 DIAGNOSIS — D50.0 IRON DEFICIENCY ANEMIA DUE TO CHRONIC BLOOD LOSS: ICD-10-CM

## 2020-05-20 DIAGNOSIS — F41.9 ANXIETY: ICD-10-CM

## 2020-05-22 RX ORDER — BENZTROPINE MESYLATE 0.5 MG/1
TABLET ORAL
Qty: 180 TAB | Refills: 1 | Status: SHIPPED | OUTPATIENT
Start: 2020-05-22 | End: 2020-12-02

## 2020-06-08 ENCOUNTER — HOSPITAL ENCOUNTER (OUTPATIENT)
Dept: MRI IMAGING | Age: 40
Discharge: HOME | End: 2020-06-08
Attending: PSYCHIATRY & NEUROLOGY
Payer: MEDICARE

## 2020-06-08 VITALS — BODY MASS INDEX: 42.38 KG/M2 | HEIGHT: 67 IN | WEIGHT: 270 LBS

## 2020-06-08 DIAGNOSIS — G35 MS (MULTIPLE SCLEROSIS) (HCC): ICD-10-CM

## 2020-06-08 PROCEDURE — 74011250636 HC RX REV CODE- 250/636: Performed by: RADIOLOGY

## 2020-06-08 PROCEDURE — 70553 MRI BRAIN STEM W/O & W/DYE: CPT

## 2020-06-08 PROCEDURE — A9575 INJ GADOTERATE MEGLUMI 0.1ML: HCPCS | Performed by: RADIOLOGY

## 2020-06-08 PROCEDURE — 72156 MRI NECK SPINE W/O & W/DYE: CPT

## 2020-06-08 RX ORDER — GADOTERATE MEGLUMINE 376.9 MG/ML
20 INJECTION INTRAVENOUS
Status: COMPLETED | OUTPATIENT
Start: 2020-06-08 | End: 2020-06-08

## 2020-06-08 RX ADMIN — GADOTERATE MEGLUMINE 20 ML: 376.9 INJECTION INTRAVENOUS at 13:27

## 2020-06-09 LAB
ALBUMIN SERPL-MCNC: 4.8 G/DL (ref 3.8–4.8)
ALP SERPL-CCNC: 83 IU/L (ref 39–117)
ALT SERPL-CCNC: 19 IU/L (ref 0–32)
AST SERPL-CCNC: 23 IU/L (ref 0–40)
BASOPHILS # BLD AUTO: 0.1 X10E3/UL (ref 0–0.2)
BASOPHILS NFR BLD AUTO: 1 %
BILIRUB DIRECT SERPL-MCNC: 0.09 MG/DL (ref 0–0.4)
BILIRUB SERPL-MCNC: 0.3 MG/DL (ref 0–1.2)
BUN SERPL-MCNC: 9 MG/DL (ref 6–20)
BUN/CREAT SERPL: 15 (ref 9–23)
CALCIUM SERPL-MCNC: 9.9 MG/DL (ref 8.7–10.2)
CHLORIDE SERPL-SCNC: 103 MMOL/L (ref 96–106)
CHOLEST SERPL-MCNC: 176 MG/DL (ref 100–199)
CO2 SERPL-SCNC: 21 MMOL/L (ref 20–29)
CREAT SERPL-MCNC: 0.62 MG/DL (ref 0.57–1)
EOSINOPHIL # BLD AUTO: 0.1 X10E3/UL (ref 0–0.4)
EOSINOPHIL NFR BLD AUTO: 1 %
ERYTHROCYTE [DISTWIDTH] IN BLOOD BY AUTOMATED COUNT: 14.2 % (ref 11.7–15.4)
FERRITIN SERPL-MCNC: 31 NG/ML (ref 15–150)
GLUCOSE SERPL-MCNC: 106 MG/DL (ref 65–99)
HCT VFR BLD AUTO: 36.9 % (ref 34–46.6)
HDLC SERPL-MCNC: 53 MG/DL
HGB BLD-MCNC: 12.6 G/DL (ref 11.1–15.9)
IMM GRANULOCYTES # BLD AUTO: 0 X10E3/UL (ref 0–0.1)
IMM GRANULOCYTES NFR BLD AUTO: 0 %
INTERPRETATION, 910389: NORMAL
IRON SATN MFR SERPL: 11 % (ref 15–55)
IRON SERPL-MCNC: 39 UG/DL (ref 27–159)
LDLC SERPL CALC-MCNC: 102 MG/DL (ref 0–99)
LYMPHOCYTES # BLD AUTO: 1.6 X10E3/UL (ref 0.7–3.1)
LYMPHOCYTES NFR BLD AUTO: 25 %
MCH RBC QN AUTO: 28.8 PG (ref 26.6–33)
MCHC RBC AUTO-ENTMCNC: 34.1 G/DL (ref 31.5–35.7)
MCV RBC AUTO: 84 FL (ref 79–97)
MONOCYTES # BLD AUTO: 0.3 X10E3/UL (ref 0.1–0.9)
MONOCYTES NFR BLD AUTO: 5 %
NEUTROPHILS # BLD AUTO: 4.3 X10E3/UL (ref 1.4–7)
NEUTROPHILS NFR BLD AUTO: 68 %
PLATELET # BLD AUTO: 400 X10E3/UL (ref 150–450)
POTASSIUM SERPL-SCNC: 4.4 MMOL/L (ref 3.5–5.2)
PROT SERPL-MCNC: 7.5 G/DL (ref 6–8.5)
RBC # BLD AUTO: 4.38 X10E6/UL (ref 3.77–5.28)
SODIUM SERPL-SCNC: 139 MMOL/L (ref 134–144)
TIBC SERPL-MCNC: 347 UG/DL (ref 250–450)
TRIGL SERPL-MCNC: 106 MG/DL (ref 0–149)
UIBC SERPL-MCNC: 308 UG/DL (ref 131–425)
VLDLC SERPL CALC-MCNC: 21 MG/DL (ref 5–40)
WBC # BLD AUTO: 6.4 X10E3/UL (ref 3.4–10.8)

## 2020-06-29 ENCOUNTER — TELEPHONE (OUTPATIENT)
Dept: ONCOLOGY | Age: 40
End: 2020-06-29

## 2020-07-07 ENCOUNTER — PATIENT MESSAGE (OUTPATIENT)
Dept: NEUROLOGY | Age: 40
End: 2020-07-07

## 2020-07-09 ENCOUNTER — OFFICE VISIT (OUTPATIENT)
Dept: NEUROLOGY | Age: 40
End: 2020-07-09

## 2020-07-09 ENCOUNTER — TELEPHONE (OUTPATIENT)
Dept: NEUROLOGY | Age: 40
End: 2020-07-09

## 2020-07-09 ENCOUNTER — HOSPITAL ENCOUNTER (OUTPATIENT)
Dept: GENERAL RADIOLOGY | Age: 40
Discharge: HOME | End: 2020-07-09
Attending: PSYCHIATRY & NEUROLOGY
Payer: MEDICARE

## 2020-07-09 VITALS
DIASTOLIC BLOOD PRESSURE: 74 MMHG | HEART RATE: 122 BPM | SYSTOLIC BLOOD PRESSURE: 100 MMHG | TEMPERATURE: 97.8 F | RESPIRATION RATE: 16 BRPM | OXYGEN SATURATION: 99 %

## 2020-07-09 DIAGNOSIS — R26.9 GAIT ABNORMALITY: ICD-10-CM

## 2020-07-09 DIAGNOSIS — G35 MS (MULTIPLE SCLEROSIS) (HCC): ICD-10-CM

## 2020-07-09 DIAGNOSIS — M25.562 ACUTE PAIN OF BOTH KNEES: Primary | ICD-10-CM

## 2020-07-09 DIAGNOSIS — M25.561 ACUTE PAIN OF BOTH KNEES: Primary | ICD-10-CM

## 2020-07-09 DIAGNOSIS — M25.562 ACUTE PAIN OF BOTH KNEES: ICD-10-CM

## 2020-07-09 DIAGNOSIS — M25.561 ACUTE PAIN OF BOTH KNEES: ICD-10-CM

## 2020-07-09 PROCEDURE — 73562 X-RAY EXAM OF KNEE 3: CPT

## 2020-07-09 NOTE — PROGRESS NOTES
[facility] and [address] at [facility] at [address] [facility], [address] [facility] [address] [facility], [address] [place] [address]   [phone]              Chief Complaint   Patient presents with    Multiple Sclerosis     Current Outpatient Medications   Medication Sig Dispense Refill    benztropine (COGENTIN) 0.5 mg tablet TAKE 1 TABLET BY MOUTH TWICE DAILY 180 Tab 1    betamethasone valerate (LUXIQ) 0.12 % topical foam       dimethyl fumarate (TECFIDERA) 240 mg cpDR Take 1 Cap by mouth two (2) times a day. 180 Cap 3    ARIPiprazole (ABILIFY) 10 mg tablet TK 1 T PO Daily 30 Tab 5    vortioxetine (TRINTELLIX) 20 mg tablet Take 1 Tab by mouth daily. 30 Tab 5    senna-docusate (PERICOLACE) 8.6-50 mg per tablet Take 1 Tab by mouth two (2) times daily as needed for Constipation. 60 Tab 3    ferrous sulfate 325 mg (65 mg iron) tablet Take 1 Tab by mouth Daily (before breakfast). Allergies   Allergen Reactions    Amoxicillin Hives     **Pt tolerated Ancef graded challenge**    Penicillins Hives     **Pt tolerated Ancef graded challenge**     Social History     Tobacco Use    Smoking status: Never Smoker    Smokeless tobacco: Never Used   Substance Use Topics    Alcohol use: Yes     Comment: occasionally    Drug use: No   78-year-old woman returns today for follow-up guarding multiple sclerosis. Her last visit with me was March 5. At that time she was doing well on Tecfidera. We have been monitoring her blood count. Her CBC from June 8 found normal white cell count and normal lymphocyte count. We also sent her for repeat MRI scans both of the brain and cervical spine. Those were personally reviewed. No active demyelination. Does have brainstem plaques. In the cervical spine she does have several plaques. There is no myelomalacia. New complaint of knee pain.   She says that her knees will lock on her. She has fallen. We discussed Ampyra but she did have a seizure in 2008 after being told she had swelling on her brain from [address]. Examination  Visit Vitals  /74 (BP 1 Location: Left arm, BP Patient Position: Sitting)   Pulse (!) 122   Temp 97.8 °F (36.6 °C)   Resp 16   SpO2 99%     Very pleasant lady. Overweight. No icterus. Awake alert oriented conversant. Normal speech and language. Normal cognition. Full versions. No nystagmus. No pronation of relief. She takes 25 seconds to walk 25 feet. Her right knee will lock as she ambulates. Impression/Plan  Multiple sclerosis doing well on Tecfidera in terms of radiographic progression however she is having increasing difficulty with ambulation. We discussed Ampyra again and she reminded me of the seizure which is a contraindication to Ampyra. We will get her some physical therapy. She has had that next-door previously and we will reorder    New complaint of knee pain and evidence of her knees locking while ambulating here in the office. Will Goeden get some knee films. Physical therapy as stated. Depending on what the knee films show we may wish to send her over to [facility] to monitor lymphocytes  Repeat MRIs 1 year  Follow-up 6 months    Lazarus Killian, MD      This note was created using voice recognition software. Despite editing, there may be syntax errors. This note will not be viewable in [address]

## 2020-07-09 NOTE — TELEPHONE ENCOUNTER
[facility] called stating that the xray for the knees was not signed and is not in the order tab. Also, she stated she wasn't sure what AMB POC meant and is unsure if that's an xray they can perform.  [phone]

## 2020-07-09 NOTE — PROGRESS NOTES
Chief Complaint   Patient presents with    Multiple Sclerosis     Walking has gotten worse.   Knees will seem to lock and give out

## 2020-07-20 ENCOUNTER — HOSPITAL ENCOUNTER (OUTPATIENT)
Dept: PHYSICAL THERAPY | Age: 40
Discharge: HOME | End: 2020-07-20
Payer: MEDICARE

## 2020-07-20 PROCEDURE — 97162 PT EVAL MOD COMPLEX 30 MIN: CPT

## 2020-07-20 NOTE — PROGRESS NOTES
PT INITIAL EVALUATION NOTE - [facility] 2-15    Patient Name: Cleo Brown  Date:2020  : 1980  [x]  Patient  Verified  Payor: Nestor Plasencia / Plan: [facility] HUMAN MEDICARE CHOICE PPO/PFFS / Product Type: Managed Care Medicare /    In time:2:15pm  Out time:3:15pm  Total Treatment Time (min): 60  Total Timed Codes (min): 0  1:1 Treatment Time ( [address] only): 60   Visit #: 1     Treatment Area: Other abnormalities of gait and mobility [R26.89]  Bilateral knee pain [M25.561, M25.562]    SUBJECTIVE  Pain Level (0-10 scale): 5/10, described as an ache  Increases with standing. Decreases with sitting  Any medication changes, allergies to medications, adverse drug reactions, diagnosis change, or new procedure performed?: [] No    [x] Yes (see summary sheet for update)  Subjective:    Saw neurologist last week. X-rays: negative. Feels like knees lock up on her when she walks. Also c/o B knee pain, and sometimes L lateral thigh pain. Has been going on for months now. History of falls. Pino Loges in December and arnold MUELLER, required surgery. MRI of brain and spine normal and blood work normal. Has been using [facility] since 2020 for long distances. Now has fear of falling. Difficulty with getting up off toilet, up from floor. No recent numbness/tingling. PLOF: no AD  Mechanism of Injury: insidious onset  Previous Treatment/Compliance: many PT episodes for LE/gait, for LUE in beginning of year - arnold CAR secondary to fall.  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 due to coronavirus  Living Situation: lives in ranch with dad, stairs to get into house from garage  Pt Goals: strengthen legs   Barriers: MS diagnosis  Motivation: good  Substance use: none  FABQ Score: n/a  Cognition: A & O x 3        OBJECTIVE/EXAMINATION  Posture: forward trunk in standing    Other Observations: I with bed mobility, but has a lot of difficulty   Gait and Functional Mobility: use of SPC, ataxic gait, decreased B knee flexion, slow speed   Palpation: Increased tension/ttp L ITP, proximal vastus lateralis    Lower Extremity PROM:        R  L  Hip IR     WNL   WNL  Hip ER   WNL  WNL  Hip flexion   95                    95  Knee Flexion  120  120     Knee Extension -5  -5     Ankle DF  -15   -15  Ankle PF    LOWER QUARTER   MUSCLE STRENGTH  KEY       R  L  0 - No Contraction  L1, L2 Psoas  1/5  1/5  1 - Trace   L3 Quads  4-/5  4-/5    2 - Poor   L4 Tib Ant  3+/5  3+/5    3 - Fair    L5 EHL  3+/5 - 3+/5   4 - Good   S1 FHL  3+/5  3+/5  5 - Normal   S2 Hams  3+/5  3+/5        MMT: See above  Hip abduction <3/5 B  Gluts <3/5 B  HS 3+/5 B     Neurological: Sensation B LE's intact to light touch  Special Tests:    SLR: negative   Single Leg Stance: unable to attempt                 With   [] TE   [] TA   [] neuro   [] other: Patient Education: [] Review HEP    [] Progressed/Changed HEP based on:   [] positioning   [] body mechanics   [] transfers   [] heat/ice application    [x] other: reviewed POC     Other Objective/Functional Measures: very fatigued by end of treatment session, requires WC to return to vehicle.       Pain Level (0-10 scale) post treatment: 5/10    ASSESSMENT/Changes in Function:     [x]  See Plan of Care      Hair Carmichael , PT, DPT, OCS, CMTPT 7/20/2020

## 2020-07-21 ENCOUNTER — VIRTUAL VISIT (OUTPATIENT)
Dept: ONCOLOGY | Age: 40
End: 2020-07-21

## 2020-07-21 DIAGNOSIS — Z86.2 HISTORY OF THROMBOCYTOSIS: ICD-10-CM

## 2020-07-21 DIAGNOSIS — G35 MS (MULTIPLE SCLEROSIS) (HCC): ICD-10-CM

## 2020-07-21 DIAGNOSIS — N92.0 MENORRHAGIA WITH REGULAR CYCLE: ICD-10-CM

## 2020-07-21 DIAGNOSIS — D50.0 IRON DEFICIENCY ANEMIA DUE TO CHRONIC BLOOD LOSS: Primary | ICD-10-CM

## 2020-07-21 NOTE — PROGRESS NOTES
[address] [facility] Oncology at [address] [place] [address]  [phone]    Hematology / Oncology Established Visit    Reason for Visit:   Nazia Ivey is a 46 y.o. female who is seen by synchronous (real-time) audio-video technology on 7/21/2020 for follow up of anemia. PCP is Dr. Emely Cerrato. History of Present Illness:   Ms. Leonel Monroy is a 46 y.o. female with MS, Depression, obesity who comes in for f/u of anemia. Review of labs reveals Hgb dropped down to 9.6 in 6/2019 and then increased to 10.6 in 7/2019. Pt states she does not take her iron pills regularly, but just recently restarted taking them once a day last week. This does occasionally cause her constipation, but no stomach upset. She states she has heavy periods and was taken off of her OCPs due to HTN. She underwent EGD, colonoscopy in 8/2018 followed by capsule endoscopy, negative for any source of bleeding. No ice cravings, melena/hematochezia, GERD. She has some SOB. Interval History: Patient is seen for follow up of anemia. She is taking iron supplements once daily. Reports low energy level but attributes this to MS. Denies SOB. Reports her periods are lighter and only last 2-3 days. Denies blood in stool. Past Medical History:   Diagnosis Date    Abnormal pap 3/2015; 4/2016; 5/16/17 2015 LGSIL +HPV; 2013 neg pap +HPV;4/2016 neg pap +HPV; 5/16/17 Neg pap +HPV    Anemia     Depression     Eczema     H/O colposcopy with cervical biopsy 3/15 + 10/2013    neg    History of seizure 4/15/2016    One in 2008    Hypercholesterolemia 5/22/2018    Morbid obesity ([address]     Multiple sclerosis ([address]     Psychiatric disorder     depression    Seizures ([address]     Last seizure 2008      Past Surgical History:   Procedure Laterality Date    [place] CHOLECYSTECTOMY  2017    Robotic-assisted laparoscopic cholecystectomy with firefly.     HX COLPOSCOPY  6/2016; 6/8/17    neg    HX GYN  unsure    LEEP    HX LEEP PROCEDURE  6/08    neg    HX ORTHOPAEDIC Left 2012    Left bunionectomy.  HX ORTHOPAEDIC Right     Right bunionectomy. Social History     Tobacco Use    Smoking status: Never Smoker    Smokeless tobacco: Never Used   Substance Use Topics    Alcohol use: Yes     Comment: occasionally      Family History   Problem Relation Age of Onset    Diabetes Mother     Hypertension Mother     MS Mother     Cancer Mother         breast    Bipolar Disorder Father     No Known Problems Sister     Breast Cancer Maternal Grandmother      Current Outpatient Medications   Medication Sig    benztropine (COGENTIN) 0.5 mg tablet TAKE 1 TABLET BY MOUTH TWICE DAILY    betamethasone valerate (LUXIQ) 0.12 % topical foam     dimethyl fumarate (TECFIDERA) 240 mg cpDR Take 1 Cap by mouth two (2) times a day.  ARIPiprazole (ABILIFY) 10 mg tablet TK 1 T PO Daily    vortioxetine (TRINTELLIX) 20 mg tablet Take 1 Tab by mouth daily.  senna-docusate (PERICOLACE) 8.6-50 mg per tablet Take 1 Tab by mouth two (2) times daily as needed for Constipation.  ferrous sulfate 325 mg (65 mg iron) tablet Take 1 Tab by mouth Daily (before breakfast). No current facility-administered medications for this visit. Allergies   Allergen Reactions    Amoxicillin Hives     **Pt tolerated Ancef graded challenge**    Penicillins Hives     **Pt tolerated Ancef graded challenge**        Review of Systems: A complete review of systems was obtained, negative except as described above. Physical Exam:       Due to this being a TeleHealth evaluation, many elements of the physical examination are unable to be assessed.      Constitutional: Appears well-developed and well-nourished in no apparent distress   Mental status: Alert and awake, Oriented to person/place/time, Able to follow commands  Eyes: EOM normal, Sclera normal, No visible ocular discharge  HENT: Normocephalic, atraumatic; Mouth/Throat: Moist mucous membranes, External Ears normal  Neck: No visualized mass  Pulmonary/Chest: Respiratory effort normal, No visualized signs of difficulty breathing or respiratory distress   Musculoskeletal: Normal gait with no signs of ataxia, Normal range of motion of neck  Neurological: No facial asymmetry (Cranial nerve 7 motor function), No gaze palsy  Skin: No significant exanthematous lesions or discoloration noted on facial skin  Psychiatric: Normal affect, normal judgment/insight. No hallucinations       Results:     Lab Results   Component Value Date/Time    WBC 6.4 06/08/2020 01:52 PM    HGB 12.6 06/08/2020 01:52 PM    HCT 36.9 06/08/2020 01:52 PM    PLATELET 187 81/42/6769 01:52 PM    MCV 84 06/08/2020 01:52 PM    ABS. NEUTROPHILS 4.3 06/08/2020 01:52 PM     Lab Results   Component Value Date/Time    Sodium 139 06/08/2020 01:49 PM    Potassium 4.4 06/08/2020 01:49 PM    Chloride 103 06/08/2020 01:49 PM    CO2 21 06/08/2020 01:49 PM    Glucose 106 (H) 06/08/2020 01:49 PM    BUN 9 06/08/2020 01:49 PM    Creatinine 0.62 06/08/2020 01:49 PM    GFR est  06/08/2020 01:49 PM    GFR est non- 06/08/2020 01:49 PM    Calcium 9.9 06/08/2020 01:49 PM     Lab Results   Component Value Date/Time    Bilirubin, total 0.3 06/08/2020 01:49 PM    ALT (SGPT) 19 06/08/2020 01:49 PM    Alk.  phosphatase 83 06/08/2020 01:49 PM    Protein, total 7.5 06/08/2020 01:49 PM    Albumin 4.8 06/08/2020 01:49 PM    Globulin 4.1 (H) 04/06/2017 01:06 PM     Lab Results   Component Value Date/Time    Iron 39 06/08/2020 01:52 PM    TIBC 347 06/08/2020 01:52 PM    Iron % saturation 11 (L) 06/08/2020 01:52 PM    Ferritin 31 06/08/2020 01:52 PM       No results found for: B12LT, FOL, RBCF  Lab Results   Component Value Date/Time    TSH 1.320 06/20/2019 03:20 PM     No results found for: HAMAT, HAAB, HABT, HAAT, HBSAG, HBSB, HBSAT, HBABN, HBCM, [address], HBCAT, Madiha Prescott, [address] [facility], [address], XHES, [id number], [address], [facility], HBCLT, [address], [id number], [id number], [address] [facility], [id number], HBCMLT, [id number], [facility]      Imaging:     Radiology report(s) reviewed. Procedures:     9/25/18 Capsule endoscopy: Negative for any evidence of bleeding. Assessment & Plan:   Nazia Ivey is a 46 y.o. female comes in for evaluation and management of iron deficiency anemia. 1. H/o Iron deficiency anemia: Now improved with oral iron supplementation. Was 2/2 menorrhagia and had worsening while pt was off of her oral iron. In the past, her anemia resolved to Hgb > 12 with oral iron supplementation as well. She previously admitted she was not taking the iron pill regularly and had just recently restarted them. I recommend continuing ferrous sulfate 325mg bid with meals and stool softeners. Normal colonoscopy in 8/2018 by Dr. Genevieve Carlton. EGD, capsule endoscopy approx 8/2018 were unrevealing. 10/15/19 labs are normal.   -- Continue once daily iron supplement. -- Return in 12 months for follow up and labs. 2. H/o Thrombocytosis: Now resolved and was likely reactive 2/2 iron deficiency. 3. Multiple sclerosis: On Tecfidera, followed by Dr. Zaina Trujillo. 4. Menorrhagia: Pt states she was taken off of OCPs due to HTN. Reports periods are now lighter and last only 2-3 days. I appreciate the opportunity to participate in Ms. [facility]. I was in the office while conducting this encounter. The patient was at her at home    Consent:  She and/or her healthcare decision maker is aware that this patient-initiated Telehealth encounter is a billable service, with coverage as determined by her insurance carrier.  She is aware that she may receive a bill and has provided verbal consent to proceed: Yes    Pursuant to the emergency declaration under the [address] and the [facility], [place], operated by [facility], [address]5 waiver authority and the [employer] General Act, this Virtual  Visit was conducted, with patient's (and/or legal guardian's) consent, to reduce the patient's risk of exposure to COVID-19 and provide necessary medical care. Services were provided through a video synchronous discussion virtually to substitute for in-person visit.         Signed By: Hugh Paul MD     July 21, 2020

## 2020-07-21 NOTE — PROGRESS NOTES
Wen Kc is a 46 y.o. female here for follow up of anemia. Patient with no complaints of pain at this time.

## 2020-07-21 NOTE — PROGRESS NOTES
[address] [facility], [address]  Phone: [phone]  Fax: [phone]    Plan of Care/Statement of Necessity for Physical Therapy Services  2-15    Patient name: Phylicia Madera  : 1980  Provider#: [id number]  Referral source: Jaime Lema MD      Medical/Treatment Diagnosis: Other abnormalities of gait and mobility [R26.89]  Bilateral knee pain [M25.561, M25.562]     Prior Hospitalization: see medical history     Comorbidities: Multiple Sclerosis  Prior Level of Function: no AD  Medications: Verified on Patient Summary List  Start of Care: 2020      Onset Date: 2020   The [address] [place] [address] and following information is based on the information from the initial evaluation. Assessment/ key information: Patient's chief complaints include B LE weakness, knees locking up when she walks, history of falls. Patient also c/o B knee pain and left lateral thigh pain. Patient presents with the following deficits: decreased B LE ROM/flexibility/strength, abnormal gait mechanics, decreased bed mobility and transfer ability, and poor proprioception. Patient to benefit from skilled physical therapy in order to address deficits and maximize functional mobility. Evaluation Complexity History HIGH Complexity :3+ comorbidities / personal factors will impact the outcome/ POC ; Examination HIGH Complexity : 4+ Standardized tests and measures addressing body structure, function, activity limitation and / or participation in recreation  ;Presentation MEDIUM Complexity : Evolving with changing characteristics  ; Clinical Decision Making MEDIUM Complexity : FOTO score of 26-74  Overall Complexity Rating: MEDIUM    Problem List: pain affecting function, decrease ROM, decrease strength, impaired gait/ balance, decrease ADL/ functional abilitiies, decrease activity tolerance, decrease flexibility/ joint mobility and decrease transfer abilities   Treatment Plan may include any combination of the following: Therapeutic exercise, Therapeutic activities, Neuromuscular re-education, Physical agent/modality, Gait/balance training, Manual therapy, Patient education, Self Care training, Functional mobility training, Home safety training and Stair training  Patient / Family readiness to learn indicated by: asking questions, trying to perform skills and interest  Persons(s) to be included in education: patient (P)  Barriers to Learning/Limitations: None  Patient Goal (s): strengthen legs  Patient Self Reported Health Status: fair  Rehabilitation Potential: good    Short Term Goals: To be accomplished in 16 treatments:  Pt will be independent in HEP in order to maintain gains made throughout PT episode. Pt will demonstrate B ankle AROM DF to at least 0 in prep for improved gait mechanics. Pt will demonstrate at least 4/5 strength for all LE MMT in prep for improved functional mobility. Long Term Goals: To be accomplished in 32 treatments:  Pt will demonstrate only mild difficulty with all bed mobility and transfers in order to increase functional mobility. Pt will demonstrate passing score on appropriate balance test in order to decrease fall risk. Pt will demonstrate ability to ambulate 500ft. With appropriate AD with minimal difficulty in orde to allow for community ambulation. Frequency / Duration: Patient to be seen 1-2 times per week for 32 treatments. Patient/ Caregiver education and instruction: activity modification    [x]  Plan of care has been reviewed with PTA        Certification Period: 90 days  Kiesha Lora , PT, DPT, OCS, CMTPT 7/21/2020     ________________________________________________________________________    I certify that the above Therapy Services are being furnished while the patient is under my care. I agree with the treatment plan and certify that this therapy is necessary.     [facility] Signature:____________________  Date:____________Time: _________

## 2020-07-23 ENCOUNTER — HOSPITAL ENCOUNTER (OUTPATIENT)
Dept: PHYSICAL THERAPY | Age: 40
Discharge: HOME | End: 2020-07-23
Payer: MEDICARE

## 2020-07-23 PROCEDURE — 97110 THERAPEUTIC EXERCISES: CPT

## 2020-07-23 PROCEDURE — 97140 MANUAL THERAPY 1/> REGIONS: CPT

## 2020-07-23 NOTE — PROGRESS NOTES
PT DAILY TREATMENT NOTE - [facility] 2-15    Patient Name: Chidi Patton  Date:2020  : 1980  [x]  Patient  Verified  Payor: [facility] / Plan: [facility] MEDICARE CHOICE PPO/PFFS / Product Type: Managed Care Medicare /    In time:4:00 pm  Out time:4:55  Total Treatment Time (min): 55  Total Timed Codes (min): 45  1:1 Treatment Time ( [address] only): 45   Visit #:  2    Treatment Area: Other abnormalities of gait and mobility [R26.89]  Bilateral knee pain [M25.561, M25.562]    SUBJECTIVE  Pain Level (0-10 scale): 3/10  Any medication changes, allergies to medications, adverse drug reactions, diagnosis change, or new procedure performed?: [x] No    [] Yes (see summary sheet for update)  Subjective functional status/changes:   [] No changes reported  C/o left lateral thigh pain with getting out of the car and walking into appointment today.      OBJECTIVE    Modality rationale: decrease pain and increase tissue extensibility to improve the patients ability to ambulate, perform transfers, and bed mobility   Min Type Additional Details       [] Estim: []Att   []Unatt    []TENS instruct                  []IFC  []Premod   []NMES                     []Other:  []w/US   []w/ice   []w/heat  Position:  Location:       []  Traction: [] Cervical       []Lumbar                       [] Prone          []Supine                       []Intermittent   []Continuous Lbs:  [] before manual  [] after manual  []w/heat    []  Ultrasound: []Continuous   [] Pulsed                       at: []1MHz   []3MHz Location:  W/cm2:    [] Paraffin         Location:   []w/heat   10 []  Ice     [x]  Heat  []  Ice massage Position: R S/L  Location: L lateral thigh    []  Laser  []  Other: Position:  Location:      []  Vasopneumatic Device Pressure:       [] lo [] med [] hi   Temperature:      [x] Skin assessment post-treatment:  [x]intact []redness- no adverse reaction    []redness  adverse reaction:     35 min Therapeutic Exercise:  [x] See flow sheet :   Rationale: increase ROM and increase strength to improve the patients ability to ambulate, perform transfers, and bed mobility    10 min Manual Therapy: foam roll L ITB, S/L quad, hip flexor stretch    Rationale: decrease pain, increase ROM and increase tissue extensibility to improve the patients ability to ambulate, perform transfers, and bed mobility            With   [x] TE   [] TA   [] neuro   [] other: Patient Education: [x] Review HEP    [] Progressed/Changed HEP based on:   [] positioning   [] body mechanics   [] transfers   [x] heat/ice application    [] other:      Other Objective/Functional Measures: Fatigues easily with SAQs L > R. Evident L hip ABD weaker with hip ABD with band. Pain Level (0-10 scale) post treatment: 3/10    ASSESSMENT/Changes in Function:   Tolerated therex fairly well. Able to ambulate with SPC to car vs WC today. Patient will continue to benefit from skilled PT services to modify and progress therapeutic interventions, address functional mobility deficits, address ROM deficits, address strength deficits, analyze and address soft tissue restrictions and analyze and cue movement patterns to attain remaining goals. []  See Plan of Care  []  See progress note/recertification  []  See Discharge Summary         Progress towards goals / Updated goals:  Pt demonstrates knowledge of HEP.      PLAN  [x]  Upgrade activities as tolerated     [x]  Continue plan of care  [x]  Update interventions per flow sheet       []  Discharge due to:_  []  Other:_      Justina Pal , PT, DPT, OCS, CMTPT 7/23/2020

## 2020-07-28 ENCOUNTER — HOSPITAL ENCOUNTER (OUTPATIENT)
Dept: PHYSICAL THERAPY | Age: 40
Discharge: HOME | End: 2020-07-28
Payer: MEDICARE

## 2020-07-28 PROCEDURE — 97140 MANUAL THERAPY 1/> REGIONS: CPT

## 2020-07-28 PROCEDURE — 97110 THERAPEUTIC EXERCISES: CPT

## 2020-07-28 NOTE — PROGRESS NOTES
PT DAILY TREATMENT NOTE - [facility] 2-15    Patient Name: Brooks Abdi  Date:2020  : 1980  [x]  Patient  Verified  Payor: Dixon Paz / Plan: [facility] MEDICARE CHOICE PPO/PFFS / Product Type: Managed Care Medicare /    In time:4:45 pm  Out time:5:35  Total Treatment Time (min): 50  Total Timed Codes (min): 40  1:1 Treatment Time ( [address] only): 40   Visit #:  3    Treatment Area: Other abnormalities of gait and mobility [R26.89]  Bilateral knee pain [M25.561, M25.562]    SUBJECTIVE  Pain Level (0-10 scale): 2/10  Any medication changes, allergies to medications, adverse drug reactions, diagnosis change, or new procedure performed?: [x] No    [] Yes (see summary sheet for update)  Subjective functional status/changes:   [] No changes reported  Improved L thigh pain after last treatment session.       OBJECTIVE    Modality rationale: decrease pain and increase tissue extensibility to improve the patients ability to ambulate, perform transfers, and bed mobility   Min Type Additional Details       [] Estim: []Att   []Unatt    []TENS instruct                  []IFC  []Premod   []NMES                     []Other:  []w/US   []w/ice   []w/heat  Position:  Location:       []  Traction: [] Cervical       []Lumbar                       [] Prone          []Supine                       []Intermittent   []Continuous Lbs:  [] before manual  [] after manual  []w/heat    []  Ultrasound: []Continuous   [] Pulsed                       at: []1MHz   []3MHz Location:  W/cm2:    [] Paraffin         Location:   []w/heat   10 []  Ice     [x]  Heat  []  Ice massage Position: R S/L  Location: L lateral thigh    []  Laser  []  Other: Position:  Location:      []  Vasopneumatic Device Pressure:       [] lo [] med [] hi   Temperature:      [x] Skin assessment post-treatment:  [x]intact []redness- no adverse reaction    []redness  adverse reaction:     30 min Therapeutic Exercise:  [x] See flow sheet :   Rationale: increase ROM and increase strength to improve the patients ability to ambulate, perform transfers, and bed mobility    10 min Manual Therapy: foam roll L ITB, S/L quad, hip flexor stretch    Rationale: decrease pain, increase ROM and increase tissue extensibility to improve the patients ability to ambulate, perform transfers, and bed mobility            With   [x] TE   [] TA   [] neuro   [] other: Patient Education: [x] Review HEP    [] Progressed/Changed HEP based on:   [] positioning   [] body mechanics   [] transfers   [x] heat/ice application    [] other:      Other Objective/Functional Measures: Improved L hip ABD with abs with band exercise today. Pain Level (0-10 scale) post treatment: 0/10    ASSESSMENT/Changes in Function:   Able to increase repetitions and add 2 new exercises today. Tolerated treatment session well except c/o B knee pain towards end of mini squats. Patient will continue to benefit from skilled PT services to modify and progress therapeutic interventions, address functional mobility deficits, address ROM deficits, address strength deficits, analyze and address soft tissue restrictions and analyze and cue movement patterns to attain remaining goals. []  See Plan of Care  []  See progress note/recertification  []  See Discharge Summary         Progress towards goals / Updated goals:  Gradually progressing with therex.      PLAN  [x]  Upgrade activities as tolerated     [x]  Continue plan of care  [x]  Update interventions per flow sheet       []  Discharge due to:_  []  Other:_      Dawit Nuñez , PT, DPT, OCS, CMTPT 7/28/2020

## 2020-07-30 ENCOUNTER — HOSPITAL ENCOUNTER (OUTPATIENT)
Dept: PHYSICAL THERAPY | Age: 40
Discharge: HOME | End: 2020-07-30
Payer: MEDICARE

## 2020-07-30 PROCEDURE — 97110 THERAPEUTIC EXERCISES: CPT

## 2020-07-30 NOTE — PROGRESS NOTES
PT DAILY TREATMENT NOTE - [facility] 2-15    Patient Name: Gume Rondon  Date:2020  : 1980  [x]  Patient  Verified  Payor: Urvashi Mackey / Plan: [facility] MEDICARE CHOICE PPO/PFFS / Product Type: Managed Care Medicare /    In time:2:15 pm  Out time:3:10  Total Treatment Time (min): 55  Total Timed Codes (min): 45  1:1 Treatment Time ( [address] only): 30   Visit #:  4    Treatment Area: Other abnormalities of gait and mobility [R26.89]  Bilateral knee pain [M25.561, M25.562]    SUBJECTIVE  Pain Level (0-10 scale): 2/10  Any medication changes, allergies to medications, adverse drug reactions, diagnosis change, or new procedure performed?: [x] No    [] Yes (see summary sheet for update)  Subjective functional status/changes:   [] No changes reported  Increased L thigh pain Tuesday night after last treatment session after she got up from sitting. Felt better after she walked around for a little bit.        OBJECTIVE    Modality rationale: decrease pain and increase tissue extensibility to improve the patients ability to ambulate, perform transfers, and bed mobility   Min Type Additional Details       [] Estim: []Att   []Unatt    []TENS instruct                  []IFC  []Premod   []NMES                     []Other:  []w/US   []w/ice   []w/heat  Position:  Location:       []  Traction: [] Cervical       []Lumbar                       [] Prone          []Supine                       []Intermittent   []Continuous Lbs:  [] before manual  [] after manual  []w/heat    []  Ultrasound: []Continuous   [] Pulsed                       at: []1MHz   []3MHz Location:  W/cm2:    [] Paraffin         Location:   []w/heat   10 []  Ice     [x]  Heat  []  Ice massage Position: R S/L  Location: L lateral thigh    []  Laser  []  Other: Position:  Location:      []  Vasopneumatic Device Pressure:       [] lo [] med [] hi   Temperature:      [x] Skin assessment post-treatment:  [x]intact []redness- no adverse reaction    []redness  adverse reaction:     40 min Therapeutic Exercise:  [x] See flow sheet :   Rationale: increase ROM and increase strength to improve the patients ability to ambulate, perform transfers, and bed mobility    5 min Manual Therapy: foam roll L ITB, S/L quad, hip flexor stretch    Rationale: decrease pain, increase ROM and increase tissue extensibility to improve the patients ability to ambulate, perform transfers, and bed mobility            With   [x] TE   [] TA   [] neuro   [] other: Patient Education: [x] Review HEP    [] Progressed/Changed HEP based on:   [] positioning   [] body mechanics   [] transfers   [x] heat/ice application    [] other:      Other Objective/Functional Measures: Increased tension/ttp L ITB. Pain Level (0-10 scale) post treatment: 1/10    ASSESSMENT/Changes in Function:   Unable to perform marches today with LLE, therefore performed hip fallouts instead. Tolerated treatment session well. Patient will continue to benefit from skilled PT services to modify and progress therapeutic interventions, address functional mobility deficits, address ROM deficits, address strength deficits, analyze and address soft tissue restrictions and analyze and cue movement patterns to attain remaining goals. []  See Plan of Care  []  See progress note/recertification  []  See Discharge Summary         Progress towards goals / Updated goals:  Gradually progressing with therex.      PLAN  [x]  Upgrade activities as tolerated     [x]  Continue plan of care  [x]  Update interventions per flow sheet       []  Discharge due to:_  []  Other:_      Miracle Hidalgo , PT, DPT, OCS, CMTPT 7/30/2020

## 2020-08-04 ENCOUNTER — HOSPITAL ENCOUNTER (OUTPATIENT)
Dept: PHYSICAL THERAPY | Age: 40
Discharge: HOME | End: 2020-08-04
Payer: MEDICARE

## 2020-08-04 PROCEDURE — 97140 MANUAL THERAPY 1/> REGIONS: CPT | Performed by: PHYSICAL MEDICINE & REHABILITATION

## 2020-08-04 PROCEDURE — 97110 THERAPEUTIC EXERCISES: CPT | Performed by: PHYSICAL MEDICINE & REHABILITATION

## 2020-08-04 NOTE — PROGRESS NOTES
PT DAILY TREATMENT NOTE - [facility] 2-15    Patient Name: Phylicia Madera  Date:2020  : 1980  [x]  Patient  Verified  Payor: Margy Castaneda / Plan: [facility] HUMAN MEDICARE CHOICE PPO/PFFS / Product Type: Managed Care Medicare /    In time:2:50PM  Out time:3:40PM  Total Treatment Time (min): 50  Total Timed Codes (min): 40  1:1 Treatment Time ([facility] only): 40   Visit #: 5      Treatment Area: Other abnormalities of gait and mobility [R26.89]  Bilateral knee pain [M25.561, M25.562]    SUBJECTIVE  Pain Level (0-10 scale): 3/10 (L IT band)  Any medication changes, allergies to medications, adverse drug reactions, diagnosis change, or new procedure performed?: [x] No    [] Yes (see summary sheet for update)  Subjective functional status/changes:   [] No changes reported  Pt. States that she is having pain in her upper hip, but not in the knees. OBJECTIVE    Modality rationale: decrease inflammation, decrease pain and increase tissue extensibility to improve the patients ability to perform daily activities independently.     Min Type Additional Details    [] Estim: []Att   []Unatt        []TENS instruct                  []IFC  []Premod   []NMES                     []Other:  []w/US   []w/ice   []w/heat  Position:  Location:    []  Traction: [] Cervical       []Lumbar                       [] Prone          []Supine                       []Intermittent   []Continuous Lbs:  [] before manual  [] after manual  []w/heat    []  Ultrasound: []Continuous   [] Pulsed at:                           []1MHz   []3MHz Location:  W/cm2:    [] Paraffin         Location:   []w/heat   10 []  Ice     [x]  Heat  []  Ice massage Position:supine  Location: L hip    []  Laser  []  Other: Position:  Location:      []  Vasopneumatic Device Pressure:       [] lo [] med [] hi   Temperature:      [x] Skin assessment post-treatment:  [x]intact []redness- no adverse reaction    []redness  adverse reaction:     30 min Therapeutic Exercise:  [x] See flow sheet :   Rationale: increase ROM, increase strength and improve coordination to improve the patients ability to perform daily activities with no increase in muscle fatigue/pain. 10 min Manual Therapy:  MFR & STM to L IT band   Rationale: decrease pain, increase tissue extensibility and decrease trigger points to improve the patients ability to perform daily activities. With   [x] TE   [] TA   [] neuro   [] other: Patient Education: [x] Review HEP    [] Progressed/Changed HEP based on:   [] positioning   [] body mechanics   [] transfers   [] heat/ice application    [] other:      Other Objective/Functional Measures: Pt. Was able to do step taps, but had difficulty lifting L leg up near the end of the repetitions. She was also able to perform the hip flexor stretch at the steps with no increase in fatigue. Pain Level (0-10 scale) post treatment: 1/10    ASSESSMENT/Changes in Function:     Patient will continue to benefit from skilled PT services to modify and progress therapeutic interventions, address functional mobility deficits, address ROM deficits, address strength deficits, analyze and address soft tissue restrictions, analyze and cue movement patterns and assess and modify postural abnormalities to attain remaining goals. []  See Plan of Care  []  See progress note/recertification  []  See Discharge Summary         Progress towards goals / Updated goals:  Pt. Is able to tolerate exercises, but still exhibits weakness in the B LE, therefore she would still benefit from skilled PT services to aid in LE and core strengthening as well as helping to increase her activity tolerance for decreased fall risk.      PLAN  [x]  Upgrade activities as tolerated     [x]  Continue plan of care  [x]  Update interventions per flow sheet       []  Discharge due to:_  []  Other:_      MALATHI Mathis 8/4/2020     1-MT, 2-TE

## 2020-08-06 ENCOUNTER — HOSPITAL ENCOUNTER (OUTPATIENT)
Dept: PHYSICAL THERAPY | Age: 40
Discharge: HOME | End: 2020-08-06
Payer: MEDICARE

## 2020-08-06 PROCEDURE — 97110 THERAPEUTIC EXERCISES: CPT | Performed by: PHYSICAL MEDICINE & REHABILITATION

## 2020-08-06 PROCEDURE — 97140 MANUAL THERAPY 1/> REGIONS: CPT | Performed by: PHYSICAL MEDICINE & REHABILITATION

## 2020-08-06 NOTE — PROGRESS NOTES
PT DAILY TREATMENT NOTE - [facility] 2-15    Patient Name: Lashaun Ramirez  Date:2020  : 1980  [x]  Patient  Verified  Payor: Gillian Cristobal / Plan: [facility] MEDICARE CHOICE PPO/PFFS / Product Type: Managed Care Medicare /    In time: 1:45p  Out time:2:40p  Total Treatment Time (min): 55  Total Timed Codes (min): 45  1:1 Treatment Time ( [address] only): 45   Visit #: 6      Treatment Area: Other abnormalities of gait and mobility [R26.89]  Bilateral knee pain [M25.561, M25.562]    SUBJECTIVE  Pain Level (0-10 scale): 1/10  Any medication changes, allergies to medications, adverse drug reactions, diagnosis change, or new procedure performed?: [x] No    [] Yes (see summary sheet for update)  Subjective functional status/changes:   [] No changes reported  Pt. Reports that she was feeling awful yesterday with almost a 10/10 pain. This occurred when she was volunteering and thinks that it was due to the chair she was sitting in during the day. She felt immediate relief when she got home and rolled out the leg muscle with glass and did some of her exercises. She reports feeling perfectly fine today. OBJECTIVE    Modality rationale: decrease inflammation, decrease pain and increase tissue extensibility to improve the patients ability to participate in daily activities.     Min Type Additional Details    [] Estim: []Att   []Unatt        []TENS instruct                  []IFC  []Premod   []NMES                     []Other:  []w/US   []w/ice   []w/heat  Position:  Location:    []  Traction: [] Cervical       []Lumbar                       [] Prone          []Supine                       []Intermittent   []Continuous Lbs:  [] before manual  [] after manual  []w/heat    []  Ultrasound: []Continuous   [] Pulsed at:                           []1MHz   []3MHz Location:  W/cm2:    [] Paraffin         Location:   []w/heat   10 []  Ice     [x]  Heat  []  Ice massage Position: R sidelying  Location: L hip    []  Laser  [] Other: Position:  Location:      []  Vasopneumatic Device Pressure:       [] lo [] med [] hi   Temperature:      [x] Skin assessment post-treatment:  [x]intact []redness- no adverse reaction    []redness  adverse reaction:     35 min Therapeutic Exercise:  [x] See flow sheet :   Rationale: increase ROM and increase strength to improve the patients ability to perform daily activities without an increase in pain or fall risk. 10 min Manual Therapy:  MFR and IASTM to L IT band   Rationale: decrease pain, increase tissue extensibility and decrease trigger points to improve the patients ability to perform daily activities independently. With   [x] TE   [] TA   [] neuro   [] other: Patient Education: [x] Review HEP    [] Progressed/Changed HEP based on:   [] positioning   [] body mechanics   [] transfers   [] heat/ice application    [] other:      Other Objective/Functional Measures: Pt. Tolerated exercises well with no increase in pain. Pt. Is much more steady during ambulation this visit and knees are not locking out during stance. L leg was much stronger during step taps and did not fatigue like last visit. Added green theraband around knees for mini squats to decrease knee valgus and lateral hip pain. Pain Level (0-10 scale) post treatment: 1/10    ASSESSMENT/Changes in Function:     Patient will continue to benefit from skilled PT services to modify and progress therapeutic interventions, address functional mobility deficits, address ROM deficits, address strength deficits, analyze and cue movement patterns and analyze and modify body mechanics/ergonomics to attain remaining goals. []  See Plan of Care  []  See progress note/recertification  []  See Discharge Summary         Progress towards goals / Updated goals:  Pt. Is progressing with exercises and has been able to see the benefit to doing her HEP.  She would continue to benefit from skilled PT services     PLAN  [x]  Upgrade activities as tolerated     [x]  Continue plan of care  [x]  Update interventions per flow sheet       []  Discharge due to:_  []  Other:_      Gracia Mistry 8/6/2020     1- MT, 2- TE

## 2020-08-11 ENCOUNTER — HOSPITAL ENCOUNTER (OUTPATIENT)
Dept: PHYSICAL THERAPY | Age: 40
Discharge: HOME | End: 2020-08-11
Payer: MEDICARE

## 2020-08-11 PROCEDURE — 97110 THERAPEUTIC EXERCISES: CPT

## 2020-08-11 PROCEDURE — 97140 MANUAL THERAPY 1/> REGIONS: CPT

## 2020-08-11 NOTE — PROGRESS NOTES
PT DAILY TREATMENT NOTE - [facility] 2-15    Patient Name: Blanca Bright  Date:2020  : 1980  [x]  Patient  Verified  Payor: Carlsrikanth Encinas / Plan: [facility] MEDICARE CHOICE PPO/PFFS / Product Type: Managed Care Medicare /    In time:3:15 pm  Out time:4:10 pm  Total Treatment Time (min): 55  Total Timed Codes (min): 45  1:1 Treatment Time ( [address] only): 45   Visit #:  7    Treatment Area: Other abnormalities of gait and mobility [R26.89]  Bilateral knee pain [M25.561, M25.562]    SUBJECTIVE  Pain Level (0-10 scale): 2/10  Any medication changes, allergies to medications, adverse drug reactions, diagnosis change, or new procedure performed?: [x] No    [] Yes (see summary sheet for update)  Subjective functional status/changes:   [] No changes reported  Continues to c/o L lateral thigh pain. Hurt a lot earlier with trying to put shoe on left foot today.    OBJECTIVE    Modality rationale: decrease pain and increase tissue extensibility to improve the patients ability to ambulate, perform transfers, and bed mobility   Min Type Additional Details       [] Estim: []Att   []Unatt    []TENS instruct                  []IFC  []Premod   []NMES                     []Other:  []w/US   []w/ice   []w/heat  Position:  Location:       []  Traction: [] Cervical       []Lumbar                       [] Prone          []Supine                       []Intermittent   []Continuous Lbs:  [] before manual  [] after manual  []w/heat    []  Ultrasound: []Continuous   [] Pulsed                       at: []1MHz   []3MHz Location:  W/cm2:    [] Paraffin         Location:   []w/heat   10 []  Ice     [x]  Heat  []  Ice massage Position: R S/L  Location: L lateral thigh    []  Laser  []  Other: Position:  Location:      []  Vasopneumatic Device Pressure:       [] lo [] med [] hi   Temperature:      [x] Skin assessment post-treatment:  [x]intact []redness- no adverse reaction    []redness  adverse reaction:     35 min Therapeutic Exercise: [x] See flow sheet :   Rationale: increase ROM and increase strength to improve the patients ability to ambulate, perform transfers, and bed mobility    10 min Manual Therapy: foam roll L ITB, MFR glute med    Rationale: decrease pain, increase ROM and increase tissue extensibility to improve the patients ability to ambulate, perform transfers, and bed mobility            With   [x] TE   [] TA   [] neuro   [] other: Patient Education: [x] Review HEP    [] Progressed/Changed HEP based on:   [] positioning   [] body mechanics   [] transfers   [x] heat/ice application    [] other:      Other Objective/Functional Measures: Increased tension/ttp L ITB, glute med. Pain Level (0-10 scale) post treatment: 1/10    ASSESSMENT/Changes in Function:    Improved muscle/ITB tension noted after manual treatment. Patient will continue to benefit from skilled PT services to modify and progress therapeutic interventions, address functional mobility deficits, address ROM deficits, address strength deficits, analyze and address soft tissue restrictions and analyze and cue movement patterns to attain remaining goals. []  See Plan of Care  []  See progress note/recertification  []  See Discharge Summary         Progress towards goals / Updated goals:  Notable improvement in ability to perform supine marches, SAQs, and bed mobility today.     PLAN  [x]  Upgrade activities as tolerated     [x]  Continue plan of care  [x]  Update interventions per flow sheet       []  Discharge due to:_  []  Other:_      Merly Whitley , PT, DPT, OCS, CMTPT 8/11/2020

## 2020-08-13 ENCOUNTER — TELEPHONE (OUTPATIENT)
Dept: NEUROLOGY | Age: 40
End: 2020-08-13

## 2020-08-13 ENCOUNTER — HOSPITAL ENCOUNTER (OUTPATIENT)
Dept: PHYSICAL THERAPY | Age: 40
Discharge: HOME | End: 2020-08-13
Payer: MEDICARE

## 2020-08-13 PROCEDURE — 97140 MANUAL THERAPY 1/> REGIONS: CPT

## 2020-08-13 PROCEDURE — 97110 THERAPEUTIC EXERCISES: CPT

## 2020-08-13 NOTE — TELEPHONE ENCOUNTER
Pippa flores/ [facility] calling to let Dr. Taniya Watkins know pt had a lot of leg strengthening exercises done at PT today and when she was standing at the , she fell. They stated the pt didn't have any injuries. Any questions please call.

## 2020-08-20 ENCOUNTER — HOSPITAL ENCOUNTER (OUTPATIENT)
Dept: PHYSICAL THERAPY | Age: 40
Discharge: HOME | End: 2020-08-20
Payer: MEDICARE

## 2020-08-20 PROCEDURE — 97110 THERAPEUTIC EXERCISES: CPT

## 2020-08-20 PROCEDURE — 97140 MANUAL THERAPY 1/> REGIONS: CPT

## 2020-08-20 NOTE — PROGRESS NOTES
[facility]  [address]  Phone: [phone]      Fax:  [phone]    Progress Note    Name: Blanca Bright   : 1980   MD: Miriam Novak MD       Treatment Diagnosis: Other abnormalities of gait and mobility [R26.89]  Bilateral knee pain [M25.561, M25.562]  Start of Care: 2020    Visits from Start of Care: 9  Missed Visits: 0    Summary of Care:Patient evaluated and treated for B LE weakness and gait abnormality. Patient also c/o L lateral thigh pain. Treatment included manual treatment for soft tissue mobilization, therapeutic exercise for LE/core strengthening and flexibility, and modalities including moist heat for pain relief. Patient demonstrates improvement in bed mobility but no significant change in strength with MMT. Think minimal progress due to severe strength deficits at initial evaluation and MS diagnosis. Pt did have a fall in lobby of clinic with prolonged standing while scheduling appointments. Pt. Denied further injury. Have since discussed use of rollator vs. SPC in order to increase safety. Assessment / Recommendations:   Progress towards goals / Updated goals:  Pt will be independent in HEP in order to maintain gains made throughout PT episode. Met  Pt will demonstrate B ankle AROM DF to at least 0 in prep for improved gait mechanics. Making progress towards  Pt will demonstrate at least 4/5 strength for all LE MMT in prep for improved functional mobility. Not met     Long Term Goals: To be accomplished in 32 treatments:  Pt will demonstrate only mild difficulty with all bed mobility and transfers in order to increase functional mobility. Making progress towards  Pt will demonstrate passing score on appropriate balance test in order to decrease fall risk. Not met  Pt will demonstrate ability to ambulate 500ft.  With appropriate AD with minimal difficulty in order to allow for community ambulation. Not met     Other: Continue PT 2x/week        Justina Pal , PT, DPT, OCS, CMTPT 8/20/2020     ________________________________________________________________________  NOTE TO PHYSICIAN:  Please complete the following and fax to: [facility]: Fax: [phone]. Henry Gonsalez Retain this original for your records. If you are unable to process this request in 24 hours, please contact our office.        ____ I have read the above report and request that my patient continue therapy with the following changes/special instructions:  ____ I have read the above report and request that my patient be discharged from therapy    Physician's Signature:_________________ Date:___________Time:__________

## 2020-08-20 NOTE — PROGRESS NOTES
PT DAILY TREATMENT NOTE - [facility] 2-15    Patient Name: Juanjose Anglin  Date:2020  : 1980  [x]  Patient  Verified  Payor: Rudi Guajardo / Plan: [facility] MEDICARE CHOICE PPO/PFFS / Product Type: Managed Care Medicare /    In time:3:15 pm  Out time:4:20 pm  Total Treatment Time (min): 65  Total Timed Codes (min): 55  1:1 Treatment Time ( [address] only): 55  Visit #:  9    Treatment Area: Other abnormalities of gait and mobility [R26.89]  Bilateral knee pain [M25.561, M25.562]    SUBJECTIVE  Pain Level (0-10 scale): 3/10  Any medication changes, allergies to medications, adverse drug reactions, diagnosis change, or new procedure performed?: [x] No    [] Yes (see summary sheet for update)  Subjective functional status/changes:   [] No changes reported  Continues to be scared of falling. L lateral thigh continues to hurt, especially whrn she puts on shoes/socks.      OBJECTIVE    Modality rationale: decrease pain and increase tissue extensibility to improve the patients ability to ambulate, perform transfers, and bed mobility   Min Type Additional Details       [] Estim: []Att   []Unatt    []TENS instruct                  []IFC  []Premod   []NMES                     []Other:  []w/US   []w/ice   []w/heat  Position:  Location:       []  Traction: [] Cervical       []Lumbar                       [] Prone          []Supine                       []Intermittent   []Continuous Lbs:  [] before manual  [] after manual  []w/heat    []  Ultrasound: []Continuous   [] Pulsed                       at: []1MHz   []3MHz Location:  W/cm2:    [] Paraffin         Location:   []w/heat   10 []  Ice     [x]  Heat  []  Ice massage Position: R S/L  Location: L lateral thigh    []  Laser  []  Other: Position:  Location:      []  Vasopneumatic Device Pressure:       [] lo [] med [] hi   Temperature:      [x] Skin assessment post-treatment:  [x]intact []redness- no adverse reaction    []redness  adverse reaction:     45 min Therapeutic Exercise:  [x] See flow sheet :   Rationale: increase ROM and increase strength to improve the patients ability to ambulate, perform transfers, and bed mobility    10 min Manual Therapy: foam roll L ITB, MFR glute med    Rationale: decrease pain, increase ROM and increase tissue extensibility to improve the patients ability to ambulate, perform transfers, and bed mobility            With   [x] TE   [] TA   [] neuro   [] other: Patient Education: [x] Review HEP    [] Progressed/Changed HEP based on:   [] positioning   [] body mechanics   [] transfers   [x] heat/ice application    [] other:      Other Objective/Functional Measures: Increased tension/ttp L ITB, glute med. Trendelenburg sign L lateral thigh pain with L SL stance.    Lower Extremity PROM:                                                                     R                      L  Hip IR                          WNL                WNL  Hip ER                         WNL                WNL  Hip flexion                   110                   110  Knee Flexion               120                  120                                Knee Extension           -5                     -5                                   Ankle DF                     -10                   -10  Ankle PF     LOWER QUARTER                            MUSCLE STRENGTH  KEY                                                                             R                      L  0 - No Contraction                   L1, L2 Psoas               1/5                   1/5  1 - Trace                                  L3 Quads                    4-/5                  4-/5                    2 - Poor                                   L4 Tib Ant                    3+/5                 3+/5                   3 - Fair                                     L5 EHL                        3+/5     -           3+/5       4 - Good                                  S1 FHL                        3+/5 3+/5  5 - Normal                               S2 Hams                     3+/5                 3+/5                                              MMT: See above  Hip abduction <3/5 B  Gluts <3/5 B  HS 3+/5 B     Pain Level (0-10 scale) post treatment: 1/10    ASSESSMENT/Changes in Function:   Improved muscle/ITB tension noted after manual treatment. Discussed strategies for decreasing pain with transferring sit to stand. Patient will continue to benefit from skilled PT services to modify and progress therapeutic interventions, address functional mobility deficits, address ROM deficits, address strength deficits, analyze and address soft tissue restrictions and analyze and cue movement patterns to attain remaining goals. []  See Plan of Care  [x]  See progress note/recertification  []  See Discharge Summary         Progress towards goals / Updated goals:  Pt will be independent in HEP in order to maintain gains made throughout PT episode. Met  Pt will demonstrate B ankle AROM DF to at least 0 in prep for improved gait mechanics. Making progress towards  Pt will demonstrate at least 4/5 strength for all LE MMT in prep for improved functional mobility. Not met     Long Term Goals: To be accomplished in 32 treatments:  Pt will demonstrate only mild difficulty with all bed mobility and transfers in order to increase functional mobility. Making progress towards  Pt will demonstrate passing score on appropriate balance test in order to decrease fall risk. Not met  Pt will demonstrate ability to ambulate 500ft. With appropriate AD with minimal difficulty in order to allow for community ambulation.  Not met     PLAN  [x]  Upgrade activities as tolerated     [x]  Continue plan of care  [x]  Update interventions per flow sheet       []  Discharge due to:_  []  Other:_      Alexus Evans , PT, DPT, OCS, CMTPT 8/20/2020

## 2020-08-25 ENCOUNTER — HOSPITAL ENCOUNTER (OUTPATIENT)
Dept: PHYSICAL THERAPY | Age: 40
Discharge: HOME | End: 2020-08-25
Payer: MEDICARE

## 2020-08-25 PROCEDURE — 97110 THERAPEUTIC EXERCISES: CPT | Performed by: PHYSICAL MEDICINE & REHABILITATION

## 2020-08-25 PROCEDURE — 97140 MANUAL THERAPY 1/> REGIONS: CPT | Performed by: PHYSICAL MEDICINE & REHABILITATION

## 2020-08-25 NOTE — PROGRESS NOTES
PT DAILY TREATMENT NOTE - [facility] 2-15    Patient Name: Jose Hensley  Date:2020  : 1980  [x]  Patient  Verified  Payor: Ernie Gonzalez / Plan: [facility] HUMAN MEDICARE CHOICE PPO/PFFS / Product Type: Managed Care Medicare /    In time:2:00p  Out time: 2:55p  Total Treatment Time (min): 55  Total Timed Codes (min): 45  1:1 Treatment Time ( [address] only): 45   Visit #: 10      Treatment Area: Other abnormalities of gait and mobility [R26.89]  Bilateral knee pain [M25.561, M25.562]    SUBJECTIVE  Pain Level (0-10 scale): 2/10  Any medication changes, allergies to medications, adverse drug reactions, diagnosis change, or new procedure performed?: [x] No    [] Yes (see summary sheet for update)  Subjective functional status/changes:   [] No changes reported  Pt. States she is feeling okay. There is a little bit of tenderness in the L hip. She says that the rollator she got recently makes her feel a lot more stable and she is not as sore using it when walking around than she was with the cane. OBJECTIVE    Modality rationale: decrease inflammation, decrease pain and increase tissue extensibility to improve the patients ability to participate in daily activities without limitation.     Min Type Additional Details    [] Estim: []Att   []Unatt        []TENS instruct                  []IFC  []Premod   []NMES                     []Other:  []w/US   []w/ice   []w/heat  Position:  Location:    []  Traction: [] Cervical       []Lumbar                       [] Prone          []Supine                       []Intermittent   []Continuous Lbs:  [] before manual  [] after manual  []w/heat    []  Ultrasound: []Continuous   [] Pulsed at:                           []1MHz   []3MHz Location:  W/cm2:    [] Paraffin         Location:   []w/heat   10 []  Ice     [x]  Heat  []  Ice massage Position: sidelying  Location: L hip    []  Laser  []  Other: Position:  Location:      []  Vasopneumatic Device Pressure:       [] lo [] med [] hi Temperature:      [x] Skin assessment post-treatment:  [x]intact []redness- no adverse reaction    []redness  adverse reaction:     35 min Therapeutic Exercise:  [x] See flow sheet :   Rationale: increase ROM, increase strength and improve coordination to improve the patients ability to participate in daily activities without limitation or an increase in pain. 10 min Manual Therapy:  MFR to L IT band, IASTM to glute med, IT band   Rationale: decrease pain, increase ROM, increase tissue extensibility and decrease trigger points to improve the patients ability to participate in daily activities. With   [x] TE   [] TA   [] neuro   [] other: Patient Education: [x] Review HEP    [] Progressed/Changed HEP based on:   [] positioning   [] body mechanics   [] transfers   [] heat/ice application    [] other:      Other Objective/Functional Measures: Pt was able to complete EO balance instep stance with no LOB or UE assist. Pt. Fatigued quickly with step taps on L LE, required seated rest break due to inability to lift leg up. Pain Level (0-10 scale) post treatment: 0/10    ASSESSMENT/Changes in Function:     Patient will continue to benefit from skilled PT services to modify and progress therapeutic interventions, address functional mobility deficits, address ROM deficits, address strength deficits, analyze and address soft tissue restrictions, analyze and cue movement patterns, assess and modify postural abnormalities and address imbalance/dizziness to attain remaining goals. []  See Plan of Care  []  See progress note/recertification  []  See Discharge Summary         Progress towards goals / Updated goals:  Pt. Continues to progress towards goals, but would continue to benefit from skilled PT services to help increase activity tolerance through LE, core strengthening and balancing activities to decrease fall risk.      PLAN  [x]  Upgrade activities as tolerated     [x]  Continue plan of care  [x]  Update interventions per flow sheet       []  Discharge due to:_  []  Other:_      MALATHI Fraga 8/25/2020     1- MT, 2- TE

## 2020-09-01 ENCOUNTER — HOSPITAL ENCOUNTER (OUTPATIENT)
Dept: PHYSICAL THERAPY | Age: 40
Discharge: HOME | End: 2020-09-01
Payer: MEDICARE

## 2020-09-01 PROCEDURE — 97140 MANUAL THERAPY 1/> REGIONS: CPT

## 2020-09-01 PROCEDURE — 97110 THERAPEUTIC EXERCISES: CPT

## 2020-09-01 NOTE — PROGRESS NOTES
PT DAILY TREATMENT NOTE - [facility] 2-15    Patient Name: Spencer Blevins  Date:2020  : 1980  [x]  Patient  Verified  Payor: Siomara Marie / Plan: [facility] [employer] MEDICARE CHOICE PPO/PFFS / Product Type: Managed Care Medicare /    In time:1:40p  Out time: 2:40p  Total Treatment Time (min): 60  Total Timed Codes (min): 50  1:1 Treatment Time ([facility] only): 50   Visit #: 11      Treatment Area: Other abnormalities of gait and mobility [R26.89]  Bilateral knee pain [M25.561, M25.562]    SUBJECTIVE  Pain Level (0-10 scale): 0/10  Any medication changes, allergies to medications, adverse drug reactions, diagnosis change, or new procedure performed?: [x] No    [] Yes (see summary sheet for update)  Subjective functional status/changes:   [] No changes reported  C/o shooting pain down LLE while sitting on , couldn't get up. Continues to have difficulty putting on shoes/socks in the AM.      OBJECTIVE    Modality rationale: decrease inflammation, decrease pain and increase tissue extensibility to improve the patients ability to participate in daily activities without limitation.     Min Type Additional Details    [] Estim: []Att   []Unatt        []TENS instruct                  []IFC  []Premod   []NMES                     []Other:  []w/US   []w/ice   []w/heat  Position:  Location:    []  Traction: [] Cervical       []Lumbar                       [] Prone          []Supine                       []Intermittent   []Continuous Lbs:  [] before manual  [] after manual  []w/heat    []  Ultrasound: []Continuous   [] Pulsed at:                           []1MHz   []3MHz Location:  W/cm2:    [] Paraffin         Location:   []w/heat   10 []  Ice     [x]  Heat  []  Ice massage Position: sidelying  Location: L hip    []  Laser  []  Other: Position:  Location:      []  Vasopneumatic Device Pressure:       [] lo [] med [] hi   Temperature:      [x] Skin assessment post-treatment:  [x]intact []redness- no adverse reaction []redness  adverse reaction:     40 min Therapeutic Exercise:  [x] See flow sheet :   Rationale: increase ROM, increase strength and improve coordination to improve the patients ability to participate in daily activities without limitation or an increase in pain. 10 min Manual Therapy:  STM/MFR L glute med, FR ITB   Rationale: decrease pain, increase ROM, increase tissue extensibility and decrease trigger points to improve the patients ability to participate in daily activities. With   [x] TE   [] TA   [] neuro   [] other: Patient Education: [x] Review HEP    [] Progressed/Changed HEP based on:   [] positioning   [] body mechanics   [] transfers   [] heat/ice application    [] other:      Other Objective/Functional Measures: Continues to present with increased tension/ttp L glute med, ITB    Pain Level (0-10 scale) post treatment: 0/10    ASSESSMENT/Changes in Function:   Decreased hip abductor strength contributing to genu valgus, contralateral hip drop and pulling through ITB with functional activities. Pt to benefit from continued PT with focus on hip strength. Patient will continue to benefit from skilled PT services to modify and progress therapeutic interventions, address functional mobility deficits, address ROM deficits, address strength deficits, analyze and address soft tissue restrictions, analyze and cue movement patterns, assess and modify postural abnormalities and address imbalance/dizziness to attain remaining goals. []  See Plan of Care  []  See progress note/recertification  []  See Discharge Summary         Progress towards goals / Updated goals:  Able to progress balance therex today.       PLAN  [x]  Upgrade activities as tolerated     [x]  Continue plan of care  [x]  Update interventions per flow sheet       []  Discharge due to:_  []  Other:_      Ammon Younger , PT, DPT, OCS, CMTPT   9/1/2020

## 2020-09-08 ENCOUNTER — HOSPITAL ENCOUNTER (OUTPATIENT)
Dept: PHYSICAL THERAPY | Age: 40
Discharge: HOME | End: 2020-09-08
Payer: MEDICARE

## 2020-09-08 PROCEDURE — 97140 MANUAL THERAPY 1/> REGIONS: CPT

## 2020-09-08 PROCEDURE — 97110 THERAPEUTIC EXERCISES: CPT

## 2020-09-08 NOTE — PROGRESS NOTES
PT DAILY TREATMENT NOTE - [facility] 2-15     Patient Name: Blanca Bright  Date:2020  : 1980  [x]  Patient  Verified  Payor: Halle Encinas / Plan: [facility] [employer] MEDICARE CHOICE PPO/PFFS / Product Type: Managed Care Medicare /    In time:1:50p  Out time: 2:40p  Total Treatment Time (min): 50  Total Timed Codes (min): 40  1:1 Treatment Time ( [address] only): 40   Visit #: 12      Treatment Area: Other abnormalities of gait and mobility [R26.89]  Bilateral knee pain [M25.561, M25.562]    SUBJECTIVE  Pain Level (0-10 scale): 0/10  Any medication changes, allergies to medications, adverse drug reactions, diagnosis change, or new procedure performed?: [x] No    [] Yes (see summary sheet for update)  Subjective functional status/changes:   [] No changes reported  Overall pain is improving, but still scared LLE is going to give out on her. OBJECTIVE    Modality rationale: decrease inflammation, decrease pain and increase tissue extensibility to improve the patients ability to participate in daily activities without limitation.     Min Type Additional Details    [] Estim: []Att   []Unatt        []TENS instruct                  []IFC  []Premod   []NMES                     []Other:  []w/US   []w/ice   []w/heat  Position:  Location:    []  Traction: [] Cervical       []Lumbar                       [] Prone          []Supine                       []Intermittent   []Continuous Lbs:  [] before manual  [] after manual  []w/heat    []  Ultrasound: []Continuous   [] Pulsed at:                           []1MHz   []3MHz Location:  W/cm2:    [] Paraffin         Location:   []w/heat   10 []  Ice     [x]  Heat  []  Ice massage Position: sidelying  Location: L hip    []  Laser  []  Other: Position:  Location:      []  Vasopneumatic Device Pressure:       [] lo [] med [] hi   Temperature:      [x] Skin assessment post-treatment:  [x]intact []redness- no adverse reaction    []redness  adverse reaction:     30 min Therapeutic Exercise:  [x] See flow sheet :   Rationale: increase ROM, increase strength and improve coordination to improve the patients ability to participate in daily activities without limitation or an increase in pain. 10 min Manual Therapy:  STM/MFR L glute med, FR ITB   Rationale: decrease pain, increase ROM, increase tissue extensibility and decrease trigger points to improve the patients ability to participate in daily activities. With   [x] TE   [] TA   [] neuro   [] other: Patient Education: [x] Review HEP    [] Progressed/Changed HEP based on:   [] positioning   [] body mechanics   [] transfers   [] heat/ice application    [] other:      Other Objective/Functional Measures: Continues to present with increased tension/ttp L glute med, ITB  Balance exercises performed with gait belt, CGA  Occassional LOB with balance exercise, pt able to self correct with hand hold  Pain Level (0-10 scale) post treatment: 0/10    ASSESSMENT/Changes in Function:      Patient will continue to benefit from skilled PT services to modify and progress therapeutic interventions, address functional mobility deficits, address ROM deficits, address strength deficits, analyze and address soft tissue restrictions, analyze and cue movement patterns, assess and modify postural abnormalities and address imbalance/dizziness to attain remaining goals. []  See Plan of Care  []  See progress note/recertification  []  See Discharge Summary         Progress towards goals / Updated goals:  Significant reduction in pain level today.        PLAN  [x]  Upgrade activities as tolerated     [x]  Continue plan of care  [x]  Update interventions per flow sheet       []  Discharge due to:_  []  Other:_goes to MD 9/24      Teetee Saeed , PT, DPT, OCS, CMTPT 9/8/2020

## 2020-09-14 ENCOUNTER — PATIENT MESSAGE (OUTPATIENT)
Dept: NEUROLOGY | Age: 40
End: 2020-09-14

## 2020-09-15 ENCOUNTER — HOSPITAL ENCOUNTER (OUTPATIENT)
Dept: PHYSICAL THERAPY | Age: 40
Discharge: HOME | End: 2020-09-15
Payer: MEDICARE

## 2020-09-15 PROCEDURE — 97140 MANUAL THERAPY 1/> REGIONS: CPT

## 2020-09-15 PROCEDURE — 97110 THERAPEUTIC EXERCISES: CPT

## 2020-09-15 NOTE — PROGRESS NOTES
PT DAILY TREATMENT NOTE - [facility] 2-15     Patient Name: Jasmin Lane  Date:9/15/2020  : 1980  [x]  Patient  Verified  Payor: Leonard Lloyd / Plan: [facility] HUMAN MEDICARE CHOICE PPO/PFFS / Product Type: Managed Care Medicare /    In time:1:45p  Out time: 2:45p  Total Treatment Time (min): 60  Total Timed Codes (min): 45  1:1 Treatment Time ( [address] only): 45   Visit #: 13      Treatment Area: Other abnormalities of gait and mobility [R26.89]  Bilateral knee pain [M25.561, M25.562]    SUBJECTIVE  Pain Level (0-10 scale): 1/10  Any medication changes, allergies to medications, adverse drug reactions, diagnosis change, or new procedure performed?: [x] No    [] Yes (see summary sheet for update)  Subjective functional status/changes:   [] No changes reported  Sore the past couple days, used pain meds, heating pad to help decrease pain. OBJECTIVE    Modality rationale: decrease inflammation, decrease pain and increase tissue extensibility to improve the patients ability to participate in daily activities without limitation.     Min Type Additional Details    [] Estim: []Att   []Unatt        []TENS instruct                  []IFC  []Premod   []NMES                     []Other:  []w/US   []w/ice   []w/heat  Position:  Location:    []  Traction: [] Cervical       []Lumbar                       [] Prone          []Supine                       []Intermittent   []Continuous Lbs:  [] before manual  [] after manual  []w/heat    []  Ultrasound: []Continuous   [] Pulsed at:                           []1MHz   []3MHz Location:  W/cm2:    [] Paraffin         Location:   []w/heat   15 []  Ice     [x]  Heat  []  Ice massage Position: sidelying  Location: L hip    []  Laser  []  Other: Position:  Location:      []  Vasopneumatic Device Pressure:       [] lo [] med [] hi   Temperature:      [x] Skin assessment post-treatment:  [x]intact []redness- no adverse reaction    []redness  adverse reaction:     35 min Therapeutic Exercise:  [x] See flow sheet :   Rationale: increase ROM, increase strength and improve coordination to improve the patients ability to participate in daily activities without limitation or an increase in pain. 10 min Manual Therapy:  STM/MFR L glute med, FR ITB   Rationale: decrease pain, increase ROM, increase tissue extensibility and decrease trigger points to improve the patients ability to participate in daily activities.              With   [x] TE   [] TA   [] neuro   [] other: Patient Education: [x] Review HEP    [] Progressed/Changed HEP based on:   [] positioning   [] body mechanics   [] transfers   [] heat/ice application    [] other:      Other Objective/Functional Measures:  Lower Extremity PROM:                                                                     R                      L                                 Ankle DF                     -5                  -10       LOWER QUARTER                            MUSCLE STRENGTH  KEY                                                                             R                      L  0 - No Contraction                   L1, L2 Psoas               2/5                   2-/5  1 - Trace                                  L3 Quads                    4/5                  4/5                    2 - Poor                                   L4 Tib Ant                    4-/5                 4-/5                   3 - Fair                                     L5 EHL                        3+/5     -           3+/5       4 - Good                                  S1 FHL                        3+/5                 3+/5  5 - Normal                               S2 Hams                     3+/5                 3+/5                                              MMT: See above  Hip abduction <3/5 B  Gluts <3/5 B  HS 3+/5 B     Pain Level (0-10 scale) post treatment: 0/10    ASSESSMENT/Changes in Function:      Patient will continue to benefit from skilled PT services to modify and progress therapeutic interventions, address functional mobility deficits, address ROM deficits, address strength deficits, analyze and address soft tissue restrictions, analyze and cue movement patterns, assess and modify postural abnormalities and address imbalance/dizziness to attain remaining goals. []  See Plan of Care  [x]  See progress note/recertification  []  See Discharge Summary         Progress towards goals / Updated goals:  Progress towards goals / Updated goals:  Pt will be independent in HEP in order to maintain gains made throughout PT episode. Met  Pt will demonstrate B ankle AROM DF to at least 0 in prep for improved gait mechanics. Making progress towards  Pt will demonstrate at least 4/5 strength for all LE MMT in prep for improved functional mobility. Not met     Long Term Goals: To be accomplished in 32 treatments:  Pt will demonstrate only mild difficulty with all bed mobility and transfers in order to increase functional mobility. Making progress towards  Pt will demonstrate passing score on appropriate balance test in order to decrease fall risk. Not met  Pt will demonstrate ability to ambulate 500ft. With appropriate AD with minimal difficulty in order to allow for community ambulation.  Not met       PLAN  [x]  Upgrade activities as tolerated     [x]  Continue plan of care  [x]  Update interventions per flow sheet       []  Discharge due to:_  []  Other:_has to reschedule MD appointment    Frandy Rowan , PT, DPT, OCS, CMTPT 9/15/2020

## 2020-09-16 NOTE — TELEPHONE ENCOUNTER
From: Raimundo Corona  To: Stephane Long MD  Sent: 9/14/2020 3:15 PM EDT  Subject: Non-Urgent Medical Question    Hi I received a call that I need to reschedule. Can it be Tuesday or Thursday afternoon?  Thanks

## 2020-09-21 ENCOUNTER — HOSPITAL ENCOUNTER (OUTPATIENT)
Dept: PHYSICAL THERAPY | Age: 40
Discharge: HOME | End: 2020-09-21
Payer: MEDICARE

## 2020-09-21 PROCEDURE — 97140 MANUAL THERAPY 1/> REGIONS: CPT

## 2020-09-21 PROCEDURE — 97110 THERAPEUTIC EXERCISES: CPT

## 2020-09-21 NOTE — PROGRESS NOTES
PT DAILY TREATMENT NOTE - [facility] 2-15     Patient Name: Annemarie Kumar  Date:2020  : 1980  [x]  Patient  Verified  Payor: Edwardo Osei / Plan: [facility] MEDICARE CHOICE PPO/PFFS / Product Type: Managed Care Medicare /    In time:5:30p  Out time: 6:25p  Total Treatment Time (min): 55  Total Timed Codes (min): 45  1:1 Treatment Time ( [address] only): 45   Visit #: 14      Treatment Area: Other abnormalities of gait and mobility [R26.89]  Bilateral knee pain [M25.561, M25.562]    SUBJECTIVE  Pain Level (0-10 scale): 0/10  Any medication changes, allergies to medications, adverse drug reactions, diagnosis change, or new procedure performed?: [x] No    [] Yes (see summary sheet for update)  Subjective functional status/changes:   [] No changes reported  Sore, but no significant pain since last treatment session. Continues to c/o difficulty getting up from low chair. OBJECTIVE    Modality rationale: decrease inflammation, decrease pain and increase tissue extensibility to improve the patients ability to participate in daily activities without limitation.     Min Type Additional Details    [] Estim: []Att   []Unatt        []TENS instruct                  []IFC  []Premod   []NMES                     []Other:  []w/US   []w/ice   []w/heat  Position:  Location:    []  Traction: [] Cervical       []Lumbar                       [] Prone          []Supine                       []Intermittent   []Continuous Lbs:  [] before manual  [] after manual  []w/heat    []  Ultrasound: []Continuous   [] Pulsed at:                           []1MHz   []3MHz Location:  W/cm2:    [] Paraffin         Location:   []w/heat   10 []  Ice     [x]  Heat  []  Ice massage Position: sidelying  Location: L hip    []  Laser  []  Other: Position:  Location:      []  Vasopneumatic Device Pressure:       [] lo [] med [] hi   Temperature:      [x] Skin assessment post-treatment:  [x]intact []redness- no adverse reaction    []redness  adverse reaction:     35 min Therapeutic Exercise:  [x] See flow sheet :   Rationale: increase ROM, increase strength and improve coordination to improve the patients ability to participate in daily activities without limitation or an increase in pain. 10 min Manual Therapy:  STM/MFR L glute med, FR ITB   Rationale: decrease pain, increase ROM, increase tissue extensibility and decrease trigger points to improve the patients ability to participate in daily activities. With   [x] TE   [] TA   [] neuro   [] other: Patient Education: [x] Review HEP    [] Progressed/Changed HEP based on:   [] positioning   [] body mechanics   [] transfers   [] heat/ice application    [] other:      Other Objective/Functional Measures:  Increased tension/ttp L ITB, glute med    Pain Level (0-10 scale) post treatment: 0/10    ASSESSMENT/Changes in Function:   Encouraged pt to perform deeper squat to improve LE strength for sit to stand transfers. Pt requires encouragement as she is fearful of LLE giving out on her. Increased safety with use of chair behind her, and mat table in front for balance. Patient will continue to benefit from skilled PT services to modify and progress therapeutic interventions, address functional mobility deficits, address ROM deficits, address strength deficits, analyze and address soft tissue restrictions, analyze and cue movement patterns, assess and modify postural abnormalities and address imbalance/dizziness to attain remaining goals. []  See Plan of Care  [x]  See progress note/recertification  []  See Discharge Summary         Progress towards goals / Updated goals:  Progress towards goals / Updated goals:  Pt will be independent in HEP in order to maintain gains made throughout PT episode. Met  Pt will demonstrate B ankle AROM DF to at least 0 in prep for improved gait mechanics.  Making progress towards  Pt will demonstrate at least 4/5 strength for all LE MMT in prep for improved functional mobility. Not met     Long Term Goals: To be accomplished in 32 treatments:  Pt will demonstrate only mild difficulty with all bed mobility and transfers in order to increase functional mobility. Making progress towards  Pt will demonstrate passing score on appropriate balance test in order to decrease fall risk. Not met  Pt will demonstrate ability to ambulate 500ft. With appropriate AD with minimal difficulty in order to allow for community ambulation.  Not met       PLAN  [x]  Upgrade activities as tolerated     [x]  Continue plan of care  [x]  Update interventions per flow sheet       []  Discharge due to:_  []  Other:_has to reschedule MD appointment    Rosas Johnson , PT, DPT, OCS, CMTPT 9/21/2020

## 2020-09-22 ENCOUNTER — VIRTUAL VISIT (OUTPATIENT)
Dept: FAMILY MEDICINE CLINIC | Age: 40
End: 2020-09-22

## 2020-09-22 DIAGNOSIS — Z13.39 SCREENING FOR ALCOHOLISM: ICD-10-CM

## 2020-09-22 DIAGNOSIS — Z00.00 MEDICARE ANNUAL WELLNESS VISIT, SUBSEQUENT: ICD-10-CM

## 2020-09-22 DIAGNOSIS — Z00.00 MEDICARE ANNUAL WELLNESS VISIT, INITIAL: Primary | ICD-10-CM

## 2020-09-22 NOTE — PATIENT INSTRUCTIONS
Medicare Wellness Visit, Female The best way to live healthy is to have a lifestyle where you eat a well-balanced diet, exercise regularly, limit alcohol use, and quit all forms of tobacco/nicotine, if applicable. Regular preventive services are another way to keep healthy. Preventive services (vaccines, screening tests, monitoring & exams) can help personalize your care plan, which helps you manage your own care. Screening tests can find health problems at the earliest stages, when they are easiest to treat. [facility] follows the current, evidence-based guidelines published by the [facility] [facility] ([facility]) when recommending preventive services for our patients. Because we follow these guidelines, sometimes recommendations change over time as research supports it. (For example, mammograms used to be recommended annually. Even though Medicare will still pay for an annual mammogram, the newer guidelines recommend a mammogram every two years for women of average risk). Of course, you and your doctor may decide to screen more often for some diseases, based on your risk and your co-morbidities (chronic disease you are already diagnosed with). Preventive services for you include: - Medicare offers their members a free annual wellness visit, which is time for you and your primary care provider to discuss and plan for your preventive service needs. Take advantage of this benefit every year! 
-All adults over the age of 72 should receive the recommended pneumonia vaccines. Current USPSTF guidelines recommend a series of two vaccines for the best pneumonia protection.  
-All adults should have a flu vaccine yearly and a tetanus vaccine every 10 years.  
-All adults age 48 and older should receive the shingles vaccines (series of two vaccines). -All adults age 38-68 who are overweight should have a diabetes screening test once every three years. -All adults born between [birth year] and 1965 should be screened once for Hepatitis C. 
-Other screening tests and preventive services for persons with diabetes include: an eye exam to screen for diabetic retinopathy, a kidney function test, a foot exam, and stricter control over your cholesterol.  
-Cardiovascular screening for adults with routine risk involves an electrocardiogram (ECG) at intervals determined by your doctor.  
-Colorectal cancer screenings should be done for adults age 54-65 with no increased risk factors for colorectal cancer. There are a number of acceptable methods of screening for this type of cancer. Each test has its own benefits and drawbacks. Discuss with your doctor what is most appropriate for you during your annual wellness visit. The different tests include: colonoscopy (considered the best screening method), a fecal occult blood test, a fecal DNA test, and sigmoidoscopy. 
 
-A bone mass density test is recommended when a woman turns 65 to screen for osteoporosis. This test is only recommended one time, as a screening. Some providers will use this same test as a disease monitoring tool if you already have osteoporosis. -Breast cancer screenings are recommended every other year for women of normal risk, age 54-69. 
-Cervical cancer screenings for women over age 72 are only recommended with certain risk factors. Here is a list of your current Health Maintenance items (your personalized list of preventive services) with a due date: 
Health Maintenance Due Topic Date Due  
 Annual Well Visit  03/05/2020  Yearly Flu Vaccine (1) 09/01/2020 Medicare Wellness Visit, Female The best way to live healthy is to have a lifestyle where you eat a well-balanced diet, exercise regularly, limit alcohol use, and quit all forms of tobacco/nicotine, if applicable. Regular preventive services are another way to keep healthy.  Preventive services (vaccines, screening tests, monitoring & exams) can help personalize your care plan, which helps you manage your own care. Screening tests can find health problems at the earliest stages, when they are easiest to treat. [facility] follows the current, evidence-based guidelines published by the [facility] ([facility]) when recommending preventive services for our patients. Because we follow these guidelines, sometimes recommendations change over time as research supports it. (For example, mammograms used to be recommended annually. Even though Medicare will still pay for an annual mammogram, the newer guidelines recommend a mammogram every two years for women of average risk). Of course, you and your doctor may decide to screen more often for some diseases, based on your risk and your co-morbidities (chronic disease you are already diagnosed with). Preventive services for you include: - Medicare offers their members a free annual wellness visit, which is time for you and your primary care provider to discuss and plan for your preventive service needs. Take advantage of this benefit every year! 
-All adults over the age of 72 should receive the recommended pneumonia vaccines. Current USPSTF guidelines recommend a series of two vaccines for the best pneumonia protection.  
-All adults should have a flu vaccine yearly and a tetanus vaccine every 10 years.  
-All adults age 48 and older should receive the shingles vaccines (series of two vaccines). -All adults age 38-68 who are overweight should have a diabetes screening test once every three years.  
-All adults born between 80 and 1965 should be screened once for Hepatitis C. 
-Other screening tests and preventive services for persons with diabetes include: an eye exam to screen for diabetic retinopathy, a kidney function test, a foot exam, and stricter control over your cholesterol. -Cardiovascular screening for adults with routine risk involves an electrocardiogram (ECG) at intervals determined by your doctor.  
-Colorectal cancer screenings should be done for adults age 54-65 with no increased risk factors for colorectal cancer. There are a number of acceptable methods of screening for this type of cancer. Each test has its own benefits and drawbacks. Discuss with your doctor what is most appropriate for you during your annual wellness visit. The different tests include: colonoscopy (considered the best screening method), a fecal occult blood test, a fecal DNA test, and sigmoidoscopy. 
 
-A bone mass density test is recommended when a woman turns 65 to screen for osteoporosis. This test is only recommended one time, as a screening. Some providers will use this same test as a disease monitoring tool if you already have osteoporosis. -Breast cancer screenings are recommended every other year for women of normal risk, age 54-69. 
-Cervical cancer screenings for women over age 72 are only recommended with certain risk factors. Here is a list of your current Health Maintenance items (your personalized list of preventive services) with a due date: 
Health Maintenance Due Topic Date Due  
 Annual Well Visit  03/05/2020  Yearly Flu Vaccine (1) 09/01/2020

## 2020-09-22 NOTE — PROGRESS NOTES
This is an Initial Medicare Annual Wellness Exam (AWV) (Performed 12 months after IPPE or effective date of Medicare Part B enrollment, Once in a lifetime) I have reviewed the patient's medical history in detail and updated the computerized patient record. History Patient Active Problem List  
Diagnosis Code  Depression, major, single episode, moderate (HCC) F32.1  History of seizure Z87.898  MS (multiple sclerosis) ([facility] [address] G35  
 History of cervical dysplasia Z87.410  
 Intrinsic eczema L20.84  Fatigue due to depression F32.9, R53.83  
 Obesity, Class II, BMI 35-39.9 E66.9  
 S/P laparoscopic cholecystectomy Z90.49  Hypercholesterolemia E78.00  Obesity, morbid ([address] E66.01 Past Medical History:  
Diagnosis Date  Abnormal pap 3/2015; 4/2016; 5/16/17 2015 LGSIL +HPV; 2013 neg pap +HPV;4/2016 neg pap +HPV; 5/16/17 Neg pap +HPV  Anemia  Depression  Eczema  H/O colposcopy with cervical biopsy 3/15 + 10/2013  
 neg  History of seizure 4/15/2016 One in 2008  Hypercholesterolemia 5/22/2018  Morbid obesity ([facility] [address]  Multiple sclerosis ([facility] [address] Past Surgical History:  
Procedure Laterality Date  HX BUNIONECTOMY  HX CHOLECYSTECTOMY  2017 Robotic-assisted laparoscopic cholecystectomy with firefly.  HX COLPOSCOPY  6/2016; 6/8/17  
 neg  HX GYN  unsure LEEP  
 HX LEEP PROCEDURE  6/08  
 neg  HX ORTHOPAEDIC Left 2012 Left bunionectomy.  HX ORTHOPAEDIC Right Right bunionectomy. Current Outpatient Medications Medication Sig Dispense Refill  benztropine (COGENTIN) 0.5 mg tablet TAKE 1 TABLET BY MOUTH TWICE DAILY 180 Tab 1  
 betamethasone valerate (LUXIQ) 0.12 % topical foam     
 dimethyl fumarate (TECFIDERA) 240 mg cpDR Take 1 Cap by mouth two (2) times a day. 180 Cap 3  
 ARIPiprazole (ABILIFY) 10 mg tablet TK 1 T PO Daily 30 Tab 5  vortioxetine (TRINTELLIX) 20 mg tablet Take 1 Tab by mouth daily. 30 Tab 5  senna-docusate (PERICOLACE) 8.6-50 mg per tablet Take 1 Tab by mouth two (2) times daily as needed for Constipation. 60 Tab 3  
 ferrous sulfate 325 mg (65 mg iron) tablet Take 1 Tab by mouth Daily (before breakfast). Allergies Allergen Reactions  Amoxicillin Hives **Pt tolerated Ancef graded challenge**  Penicillins Hives **Pt tolerated Ancef graded challenge** Family History Problem Relation Age of Onset  Diabetes Mother  Hypertension Mother  MS Mother  Cancer Mother   
     breast  
 Bipolar Disorder Father  No Known Problems Sister  Breast Cancer Maternal Grandmother Social History Tobacco Use  Smoking status: Never Smoker  Smokeless tobacco: Never Used Substance Use Topics  Alcohol use: Yes Comment: occasionally Depression Risk Factor Screening: No flowsheet data found. Alcohol Risk Screen AUDIT Screen Score: Document Brief Intervention (corresponds directly with the 5 A's, Ask, Advise, Assess, Assist, and Arrange):  *** At- Risk Patients (Score 7-15 for women; 8-15 for men) Discuss concern patient is drinking at unhealthy levels known to increase risk of alcohol-related health problems. Is Patient ready to commit to change? *** If No: 
? Encourage reflection ? Discuss short term and long term health risks of consuming alcohol ? Barriers to change ? Reaffirm willingness to help / Educational materials provided If Yes: 
? Set goal 
? Plan 
? Educational materials provided Harmful use or Dependence (Score 16 or greater) ? Discuss short term and long term health risks of consuming alcohol ? Recommendations ? Negotiate drinking goal 
? Recommend addiction specialist/center ? Arrange follow-up appointments. Functional Ability and Level of Safety:  
Hearing: {Desc; hearing loss:97764::\"Hearing is good. \"} Activities of Daily Living: The home contains: {AWV Home CWMFPD:45616::\"EC safety equipment. \"} 
{Functional ADL's:46568::\"Patient does total self care\"} Ambulation: {Patient ambulates:94237::\"with no difficulty\"} Fall Risk: 
No flowsheet data found. Abuse Screen: 
{Abuse Screen:::\"Patient is not abused\"} Cognitive Screening Has your family/caregiver stated any concerns about your memory: {AWV no/yes:21623::\"no\"} {Cognitive Screenin} Patient Care Team  
Patient Care Team: 
Laurence Fabian MD as PCP - General (Family Medicine) Laurence Fabian MD as PCP - REHABILITATION [facility] Provider Genaro Beltran MD (Obstetrics & Gynecology) Tommy Pulido MD (Surgery) Junior Villela MD (Hematology and Oncology) Dulce Green MD (Gastroenterology) Harman Elmore MD (Neurology) William Fenton NP (Nurse Practitioner) Assessment/Plan Education and counseling provided: 
{Education List, choose as appropriate:::\"Are appropriate based on today's review and evaluation\"} Diagnoses and all orders for this visit: 
 
1. Medicare annual wellness visit, initial 
 
2. Medicare annual wellness visit, subsequent 3. Screening for alcoholism Health Maintenance Due Topic Date Due  Medicare Yearly Exam  2020  Flu Vaccine (1) 2020 Derrick Wade, who was evaluated through a synchronous (real-time) {virtual platform audio-video vs audio only:72824::\"audio-video\"} encounter, and/or her healthcare decision maker, is aware that it is a billable service, with coverage as determined by her insurance carrier. She provided verbal consent to proceed: {YES/NO/NA-Consent obtained within past 12 months:62028::\"Yes\"}, and patient identification was verified. It was conducted pursuant to the emergency declaration under the [facility], [address] [place] [address] waiver authority and the [employer] General Act.  A caregiver was present when appropriate. Ability to conduct physical exam was limited. I was {location home office other:26877::\"at home\"}. The patient was {location home office other:64625::\"at home\"}.  
 
 
Nadira Mcelroy MD

## 2020-09-22 NOTE — PROGRESS NOTES
Chief Complaint   Patient presents with   [address] [place] [address] Annual Wellness Visit     Initial   1. Have you been to the ER, urgent care clinic since your last visit? Hospitalized since your last visit? No    2. Have you seen or consulted any other health care providers outside of the [address] [place] [address] since your last visit? Include any pap smears or colon screening.  No

## 2020-09-22 NOTE — PROGRESS NOTES
This is the Subsequent Medicare Annual Wellness Exam, performed 12 months or more after the Initial AWV or the last Subsequent AWV I have reviewed the patient's medical history in detail and updated the computerized patient record. History Patient Active Problem List  
Diagnosis Code  Depression, major, single episode, moderate (HCC) F32.1  History of seizure Z87.898  MS (multiple sclerosis) ([facility] [address] G35  
 History of cervical dysplasia Z87.410  
 Intrinsic eczema L20.84  Fatigue due to depression F32.9, R53.83  
 Obesity, Class II, BMI 35-39.9 E66.9  
 S/P laparoscopic cholecystectomy Z90.49  Hypercholesterolemia E78.00  Obesity, morbid ([address] E66.01 Past Medical History:  
Diagnosis Date  Abnormal pap 3/2015; 4/2016; 5/16/17 2015 LGSIL +HPV; 2013 neg pap +HPV;4/2016 neg pap +HPV; 5/16/17 Neg pap +HPV  Anemia  Depression  Eczema  H/O colposcopy with cervical biopsy 3/15 + 10/2013  
 neg  History of seizure 4/15/2016 One in 2008  Hypercholesterolemia 5/22/2018  Morbid obesity ([facility] [address]  Multiple sclerosis ([facility] [address] Past Surgical History:  
Procedure Laterality Date  HX BUNIONECTOMY  HX CHOLECYSTECTOMY  2017 Robotic-assisted laparoscopic cholecystectomy with firefly.  HX COLPOSCOPY  6/2016; 6/8/17  
 neg  HX GYN  unsure LEEP  
 HX LEEP PROCEDURE  6/08  
 neg  HX ORTHOPAEDIC Left 2012 Left bunionectomy.  HX ORTHOPAEDIC Right Right bunionectomy. Current Outpatient Medications Medication Sig Dispense Refill  benztropine (COGENTIN) 0.5 mg tablet TAKE 1 TABLET BY MOUTH TWICE DAILY 180 Tab 1  
 betamethasone valerate (LUXIQ) 0.12 % topical foam     
 dimethyl fumarate (TECFIDERA) 240 mg cpDR Take 1 Cap by mouth two (2) times a day. 180 Cap 3  
 ARIPiprazole (ABILIFY) 10 mg tablet TK 1 T PO Daily 30 Tab 5  vortioxetine (TRINTELLIX) 20 mg tablet Take 1 Tab by mouth daily. 30 Tab 5  senna-docusate (PERICOLACE) 8.6-50 mg per tablet Take 1 Tab by mouth two (2) times daily as needed for Constipation. 60 Tab 3  
 ferrous sulfate 325 mg (65 mg iron) tablet Take 1 Tab by mouth Daily (before breakfast). Allergies Allergen Reactions  Amoxicillin Hives **Pt tolerated Ancef graded challenge**  Penicillins Hives **Pt tolerated Ancef graded challenge** Family History Problem Relation Age of Onset  Diabetes Mother  Hypertension Mother  MS Mother  Cancer Mother   
     breast  
 Bipolar Disorder Father  No Known Problems Sister  Breast Cancer Maternal Grandmother Social History Tobacco Use  Smoking status: Never Smoker  Smokeless tobacco: Never Used Substance Use Topics  Alcohol use: Yes Comment: occasionally Depression Risk Factor Screening: No flowsheet data found. Alcohol Risk Screen AUDIT Screen Score: Document Brief Intervention (corresponds directly with the 5 A's, Ask, Advise, Assess, Assist, and Arrange):  *** At- Risk Patients (Score 7-15 for women; 8-15 for men) Discuss concern patient is drinking at unhealthy levels known to increase risk of alcohol-related health problems. Is Patient ready to commit to change? *** If No: 
? Encourage reflection ? Discuss short term and long term health risks of consuming alcohol ? Barriers to change ? Reaffirm willingness to help / Educational materials provided If Yes: 
? Set goal 
? Plan 
? Educational materials provided Harmful use or Dependence (Score 16 or greater) ? Discuss short term and long term health risks of consuming alcohol ? Recommendations ? Negotiate drinking goal 
? Recommend addiction specialist/center ? Arrange follow-up appointments. Functional Ability and Level of Safety:  
Hearing: {Desc; hearing loss:28926::\"Hearing is good. \"} Activities of Daily Living: No flowsheet data found. Ambulation: {Patient ambulates:87325::\"with no difficulty\"} Fall Risk: 
No flowsheet data found. Abuse Screen: 
{Abuse Screen:::\"Patient is not abused\"} Cognitive Screening Has your family/caregiver stated any concerns about your memory: {AWV no/yes:09478::\"no\"} {Cognitive Screenin} Patient Care Team  
Patient Care Team: 
Monik Johnson MD as PCP - General (Family Medicine) Monik Johnson MD as PCP - [facility] Provider Ramesh Heller MD (Obstetrics & Gynecology) Viola Brown MD (Surgery) Katlyn Owen MD (Hematology and Oncology) Elias Middleton MD (Gastroenterology) Samantha Zaldivar MD (Neurology) Jeannette Deshpande NP (Nurse Practitioner) Assessment/Plan Education and counseling provided: 
{Education List, choose as appropriate:::\"Are appropriate based on today's review and evaluation\"} Diagnoses and all orders for this visit: 
 
1. Medicare annual wellness visit, subsequent 2. Screening for alcoholism Health Maintenance Due Topic Date Due  Medicare Yearly Exam  2020  Flu Vaccine (1) 2020 [employer], who was evaluated through a synchronous (real-time) {virtual platform audio-video vs audio only:00746::\"audio-video\"} encounter, and/or her healthcare decision maker, is aware that it is a billable service, with coverage as determined by her insurance carrier. She provided verbal consent to proceed: {YES/NO/NA-Consent obtained within past 12 months:92832::\"Yes\"}, and patient identification was verified. It was conducted pursuant to the emergency declaration under the [facility], [facility] authority and the [employer] General Act. A caregiver was present when appropriate. Ability to conduct physical exam was limited. I was {location home office other:51296::\"at home\"}.  The patient was {location home office other:44966::\"at home\"}.  
 
Bobby Newman MD

## 2020-09-24 ENCOUNTER — OFFICE VISIT (OUTPATIENT)
Dept: OBGYN CLINIC | Age: 40
End: 2020-09-24
Payer: MEDICARE

## 2020-09-24 VITALS
BODY MASS INDEX: 40.09 KG/M2 | HEIGHT: 67 IN | WEIGHT: 255.4 LBS | DIASTOLIC BLOOD PRESSURE: 73 MMHG | SYSTOLIC BLOOD PRESSURE: 121 MMHG

## 2020-09-24 DIAGNOSIS — Z01.419 ENCOUNTER FOR GYNECOLOGICAL EXAMINATION (GENERAL) (ROUTINE) WITHOUT ABNORMAL FINDINGS: Primary | ICD-10-CM

## 2020-09-24 PROCEDURE — G8417 CALC BMI ABV UP PARAM F/U: HCPCS | Performed by: OBSTETRICS & GYNECOLOGY

## 2020-09-24 PROCEDURE — G9717 DOC PT DX DEP/BP F/U NT REQ: HCPCS | Performed by: OBSTETRICS & GYNECOLOGY

## 2020-09-24 PROCEDURE — 99395 PREV VISIT EST AGE 18-39: CPT | Performed by: OBSTETRICS & GYNECOLOGY

## 2020-09-24 NOTE — PROGRESS NOTES
Nazia Ivey is a [de-identified] P5,  46 y.o. female [address] whose Patient's last menstrual period was 09/24/2020. was on 9/24/2020 who presents for her annual checkup. She is having no significant problems. MS has gotten a lot worse    With regard to the Gardisil vaccine, she is older than the FDA approved age to receive it. Menstrual status:    Her periods are light, moderate in flow. She is using three to five pads or tampons per day, usually regular and last 26-30 days. She denies dysmenorrhea. She reports no premenstrual symptoms. Contraception:    The current method of family planning is none and She declines contraception and counseling. Sexual history:    She  reports previously being sexually active and has had partner(s) who are Male. Medical conditions:    Since her last annual GYN exam about one year ago, she has not the following changes in her health history: none. Pap and Mammogram History:    Her most recent Pap smear was 6/5/2018 normal/HPV neg    The patient has never had a mammogram.    The patient does not have a family history of breast cancer. Past Medical History:   Diagnosis Date    Abnormal pap 3/2015; 4/2016; 5/16/17 2015 LGSIL +HPV; 2013 neg pap +HPV;4/2016 neg pap +HPV; 5/16/17 Neg pap +HPV    Anemia     Depression     Eczema     H/O colposcopy with cervical biopsy 3/15 + 10/2013    neg    History of seizure 4/15/2016    One in 2008    Hypercholesterolemia 5/22/2018    Morbid obesity ([address]     Multiple sclerosis ([address]      Past Surgical History:   Procedure Laterality Date    [place] CHOLECYSTECTOMY  2017    Robotic-assisted laparoscopic cholecystectomy with firefly.  HX COLPOSCOPY  6/2016; 6/8/17    neg    HX GYN  unsure    LEEP    HX LEEP PROCEDURE  6/08    neg    HX ORTHOPAEDIC Left 2012    Left bunionectomy.  HX ORTHOPAEDIC Right     Right bunionectomy.        Current Outpatient Medications   Medication Sig Dispense Refill    benztropine (COGENTIN) 0.5 mg tablet TAKE 1 TABLET BY MOUTH TWICE DAILY 180 Tab 1    dimethyl fumarate (TECFIDERA) 240 mg cpDR Take 1 Cap by mouth two (2) times a day. 180 Cap 3    ARIPiprazole (ABILIFY) 10 mg tablet TK 1 T PO Daily 30 Tab 5    vortioxetine (TRINTELLIX) 20 mg tablet Take 1 Tab by mouth daily. 30 Tab 5    senna-docusate (PERICOLACE) 8.6-50 mg per tablet Take 1 Tab by mouth two (2) times daily as needed for Constipation. 60 Tab 3    ferrous sulfate 325 mg (65 mg iron) tablet Take 1 Tab by mouth Daily (before breakfast).       betamethasone valerate (LUXIQ) 0.12 % topical foam        Allergies: Amoxicillin and Penicillins   Social History     Socioeconomic History    Marital status: SINGLE     Spouse name: Not on file    Number of children: Not on file    Years of education: Not on file    Highest education level: Not on file   Occupational History    Not on file   Social Needs    Financial resource strain: Not on file    Food insecurity     Worry: Not on file     Inability: Not on file    Transportation needs     Medical: Not on file     Non-medical: Not on file   Tobacco Use    Smoking status: Never Smoker    Smokeless tobacco: Never Used   Substance and Sexual Activity    Alcohol use: Yes     Comment: occasionally    Drug use: No    Sexual activity: Not Currently     Partners: Male   Lifestyle    Physical activity     Days per week: Not on file     Minutes per session: Not on file    Stress: Not on file   Relationships    Social connections     Talks on phone: Not on file     Gets together: Not on file     Attends [employer] service: Not on file     Active member of club or organization: Not on file     Attends meetings of clubs or organizations: Not on file     Relationship status: Not on file    Intimate partner violence     Fear of current or ex partner: Not on file     Emotionally abused: Not on file     Physically abused: Not on file     Forced sexual activity: Not on file   Other Topics Concern    Not on file   Social History Narrative    Not on file     Tobacco History:  reports that she has never smoked. She has never used smokeless tobacco.  Alcohol Abuse:  reports current alcohol use. Drug Abuse:  reports no history of drug use.     Patient Active Problem List   Diagnosis Code    Depression, major, single episode, moderate ([facility] F32.1    History of seizure Z87.898    MS (multiple sclerosis) ([facility] G35    History of cervical dysplasia Z87.410    Intrinsic eczema L20.84    Fatigue due to depression F32.9, R53.83    Obesity, Class II, BMI 35-39.9 E66.9    S/P laparoscopic cholecystectomy Z90.49    Hypercholesterolemia E78.00    Obesity, morbid (HCC) E66.01       Review of Systems - History obtained from the patient  Constitutional: negative for weight loss, fever, night sweats  HEENT: negative for hearing loss, earache, congestion, snoring, sorethroat  CV: negative for chest pain, palpitations, edema  Resp: negative for cough, shortness of breath, wheezing  GI: negative for change in bowel habits, abdominal pain, black or bloody stools  : negative for frequency, dysuria, hematuria, vaginal discharge  MSK: negative for back pain, joint pain, muscle pain  Breast: negative for breast lumps, nipple discharge, galactorrhea  Skin :negative for itching, rash, hives  Neuro: negative for dizziness, headache, confusion, weakness  Psych: negative for anxiety, depression, change in mood  Heme/lymph: negative for bleeding, bruising, pallor    Physical Exam    Visit Vitals  /73   Ht 5' 7\" (1.702 m)   Wt 255 lb 6.4 oz (115.8 kg)   LMP 09/24/2020   BMI 40.00 kg/m²       Constitutional  · Appearance: well-nourished, well developed, alert, in no acute distress    HENT  · Head and Face: appears normal    Neck  · Inspection/Palpation: normal appearance, no masses or tenderness  · Lymph Nodes: no lymphadenopathy present  · Thyroid: gland size normal, nontender, no nodules or masses present on palpation    Chest  · Respiratory Effort: breathing normal  · Auscultation: normal breath sounds    Cardiovascular  · Heart:  · Auscultation: regular rate and rhythm without murmur    Breasts  · Inspection of Breasts: breasts symmetrical, no skin changes, no discharge present, nipple appearance normal, no skin retraction present  · Palpation of Breasts and Axillae: no masses present on palpation, no breast tenderness  · Axillary Lymph Nodes: no lymphadenopathy present    Gastrointestinal  · Abdominal Examination: abdomen non-tender to palpation, normal bowel sounds, no masses present  · Liver and spleen: no hepatomegaly present, spleen not palpable  · Hernias: no hernias identified    Genitourinary  · External Genitalia: normal appearance for age, no discharge present, no tenderness present, no inflammatory lesions present, no masses present, no atrophy present  · Vagina: normal vaginal vault without central or paravaginal defects, bleeding present, no inflammatory lesions present, no masses present  · Bladder: non-tender to palpation  · Urethra: appears normal  · Cervix: normal   · Uterus: normal size, shape and consistency  · Adnexa: no adnexal tenderness present, no adnexal masses present  · Perineum: perineum within normal limits, no evidence of trauma, no rashes or skin lesions present  · Anus: anus within normal limits, no hemorrhoids present  · Inguinal Lymph Nodes: no lymphadenopathy present    Skin  · General Inspection: no rash, no lesions identified    Neurologic/Psychiatric  · Mental Status:  · Orientation: grossly oriented to person, place and time  · Mood and Affect: mood normal, affect appropriate    . Assessment:  Routine gynecologic examination  Her current medical status is satisfactory with no evidence of significant gynecologic issues.     Plan:  Counseled re: diet, exercise, healthy lifestyle  Return for yearly wellness visits

## 2020-09-24 NOTE — PATIENT INSTRUCTIONS
Well Visit, Ages 25 to 48: Care Instructions Your Care Instructions Physical exams can help you stay healthy. Your doctor has checked your overall health and may have suggested ways to take good care of yourself. He or she also may have recommended tests. At home, you can help prevent illness with healthy eating, regular exercise, and other steps. Follow-up care is a key part of your treatment and safety. Be sure to make and go to all appointments, and call your doctor if you are having problems. It's also a good idea to know your test results and keep a list of the medicines you take. How can you care for yourself at home? · Reach and stay at a healthy weight. This will lower your risk for many problems, such as obesity, diabetes, heart disease, and high blood pressure. · Get at least 30 minutes of physical activity on most days of the week. Walking is a good choice. You also may want to do other activities, such as running, swimming, cycling, or playing tennis or team sports. Discuss any changes in your exercise program with your doctor. · Do not smoke or allow others to smoke around you. If you need help quitting, talk to your doctor about stop-smoking programs and medicines. These can increase your chances of quitting for good. · Talk to your doctor about whether you have any risk factors for sexually transmitted infections (STIs). Having one sex partner (who does not have STIs and does not have sex with anyone else) is a good way to avoid these infections. · Use birth control if you do not want to have children at this time. Talk with your doctor about the choices available and what might be best for you. · Protect your skin from too much sun. When you're outdoors from 10 a.m. to 4 p.m., stay in the shade or cover up with clothing and a hat with a wide brim. Wear sunglasses that block UV rays. Even when it's cloudy, put broad-spectrum sunscreen (SPF 30 or higher) on any exposed skin. · See a dentist one or two times a year for checkups and to have your teeth cleaned. · Wear a seat belt in the car. Follow your doctor's advice about when to have certain tests. These tests can spot problems early. For everyone · Cholesterol. Have the fat (cholesterol) in your blood tested after age 21. Your doctor will tell you how often to have this done based on your age, family history, or other things that can increase your risk for heart disease. · Blood pressure. Have your blood pressure checked during a routine doctor visit. Your doctor will tell you how often to check your blood pressure based on your age, your blood pressure results, and other factors. · Vision. Talk with your doctor about how often to have a glaucoma test. 
· Diabetes. Ask your doctor whether you should have tests for diabetes. · Colon cancer. Your risk for colorectal cancer gets higher as you get older. Some experts say that adults should start regular screening at age 48 and stop at age 76. Others say to start before age 48 or continue after age 76. Talk with your doctor about your risk and when to start and stop screening. For women · Breast exam and mammogram. Talk to your doctor about when you should have a clinical breast exam and a mammogram. Medical experts differ on whether and how often women under 50 should have these tests. Your doctor can help you decide what is right for you. · Cervical cancer screening test and pelvic exam. Begin with a Pap test at age 24. The test often is part of a pelvic exam. Starting at age 27, you may choose to have a Pap test, an HPV test, or both tests at the same time (called co-testing). Talk with your doctor about how often to have testing. · Tests for sexually transmitted infections (STIs). Ask whether you should have tests for STIs. You may be at risk if you have sex with more than one person, especially if your partners do not wear condoms. For men · Tests for sexually transmitted infections (STIs). Ask whether you should have tests for STIs. You may be at risk if you have sex with more than one person, especially if you do not wear a condom. · Testicular cancer exam. Ask your doctor whether you should check your testicles regularly. · Prostate exam. Talk to your doctor about whether you should have a blood test (called a PSA test) for prostate cancer. Experts differ on whether and when men should have this test. Some experts suggest it if you are older than 39 and are -American or have a father or brother who got prostate cancer when he was younger than 72. When should you call for help? Watch closely for changes in your health, and be sure to contact your doctor if you have any problems or symptoms that concern you. Where can you learn more? Go to http://luis daniel-rashawn.info/ Enter P072 in the search box to learn more about \"Well Visit, Ages 25 to 48: Care Instructions. \" Current as of: May 27, 2020               Content Version: 12.6 © 3474-4016 [employer]. Care instructions adapted under license by [employer] (which disclaims liability or warranty for this information). If you have questions about a medical condition or this instruction, always ask your healthcare professional. [address] any warranty or liability for your use of this information.

## 2020-09-28 ENCOUNTER — HOSPITAL ENCOUNTER (OUTPATIENT)
Dept: PHYSICAL THERAPY | Age: 40
Discharge: HOME | End: 2020-09-28
Payer: MEDICARE

## 2020-09-28 PROCEDURE — 97140 MANUAL THERAPY 1/> REGIONS: CPT

## 2020-09-28 PROCEDURE — 97110 THERAPEUTIC EXERCISES: CPT

## 2020-09-28 NOTE — PROGRESS NOTES
PT DAILY TREATMENT NOTE - [facility] 2-15     Patient Name: Guerrero Show  Date:2020  : 1980  [x]  Patient  Verified  Payor: Benito Lama / Plan: [facility] MEDICARE CHOICE PPO/PFFS / Product Type: Managed Care Medicare /    In time:5:25p  Out time: 6:25p  Total Treatment Time (min): 60  Total Timed Codes (min): 50  1:1 Treatment Time ( [address] only): 50   Visit #: 15      Treatment Area: Other abnormalities of gait and mobility [R26.89]  Bilateral knee pain [M25.561, M25.562]    SUBJECTIVE  Pain Level (0-10 scale): 2/10  Any medication changes, allergies to medications, adverse drug reactions, diagnosis change, or new procedure performed?: [x] No    [] Yes (see summary sheet for update)  Subjective functional status/changes:   [] No changes reported  Continues to feel sore L lateral hip/thigh, felt it today with getting out of the car. Still feels like LLE is going to give out on her. OBJECTIVE    Modality rationale: decrease inflammation, decrease pain and increase tissue extensibility to improve the patients ability to participate in daily activities without limitation.     Min Type Additional Details    [] Estim: []Att   []Unatt        []TENS instruct                  []IFC  []Premod   []NMES                     []Other:  []w/US   []w/ice   []w/heat  Position:  Location:    []  Traction: [] Cervical       []Lumbar                       [] Prone          []Supine                       []Intermittent   []Continuous Lbs:  [] before manual  [] after manual  []w/heat    []  Ultrasound: []Continuous   [] Pulsed at:                           []1MHz   []3MHz Location:  W/cm2:    [] Paraffin         Location:   []w/heat   10 []  Ice     [x]  Heat  []  Ice massage Position: sidelying  Location: L hip    []  Laser  []  Other: Position:  Location:      []  Vasopneumatic Device Pressure:       [] lo [] med [] hi   Temperature:      [x] Skin assessment post-treatment:  [x]intact []redness- no adverse reaction []redness  adverse reaction:     40 min Therapeutic Exercise:  [x] See flow sheet :   Rationale: increase ROM, increase strength and improve coordination to improve the patients ability to participate in daily activities without limitation or an increase in pain. 10 min Manual Therapy:  STM/MFR L glute med, FR ITB   Rationale: decrease pain, increase ROM, increase tissue extensibility and decrease trigger points to improve the patients ability to participate in daily activities. With   [x] TE   [] TA   [] neuro   [] other: Patient Education: [x] Review HEP    [] Progressed/Changed HEP based on:   [] positioning   [] body mechanics   [] transfers   [] heat/ice application    [] other:      Other Objective/Functional Measures:  Continues to require cues to increase LLE activation with therex. Pain Level (0-10 scale) post treatment: 0/10    ASSESSMENT/Changes in Function:     Patient will continue to benefit from skilled PT services to modify and progress therapeutic interventions, address functional mobility deficits, address ROM deficits, address strength deficits, analyze and address soft tissue restrictions, analyze and cue movement patterns, assess and modify postural abnormalities and address imbalance/dizziness to attain remaining goals. []  See Plan of Care  [x]  See progress note/recertification  []  See Discharge Summary         Progress towards goals / Updated goals:  Progress towards goals / Updated goals:  Pt will be independent in HEP in order to maintain gains made throughout PT episode. Met  Pt will demonstrate B ankle AROM DF to at least 0 in prep for improved gait mechanics. Making progress towards  Pt will demonstrate at least 4/5 strength for all LE MMT in prep for improved functional mobility. Not met     Long Term Goals:  To be accomplished in 32 treatments:  Pt will demonstrate only mild difficulty with all bed mobility and transfers in order to increase functional mobility. Making progress towards  Pt will demonstrate passing score on appropriate balance test in order to decrease fall risk. Not met  Pt will demonstrate ability to ambulate 500ft. With appropriate AD with minimal difficulty in order to allow for community ambulation.  Met    PLAN  [x]  Upgrade activities as tolerated     [x]  Continue plan of care  [x]  Update interventions per flow sheet       []  Discharge due to:_  []  Other:_neurology appointment next Tuesday    Susan Ash , PT, DPT, OCS, CMTPT 9/28/2020

## 2020-10-05 ENCOUNTER — HOSPITAL ENCOUNTER (OUTPATIENT)
Dept: PHYSICAL THERAPY | Age: 40
Discharge: HOME | End: 2020-10-05
Payer: MEDICARE

## 2020-10-05 PROCEDURE — 97140 MANUAL THERAPY 1/> REGIONS: CPT

## 2020-10-05 PROCEDURE — 97110 THERAPEUTIC EXERCISES: CPT

## 2020-10-05 NOTE — PROGRESS NOTES
[facility]  [address] [facility] Stephanie Pascual  Phone: [phone]      Fax:  [phone]    Progress Note    Name: Duc Leon   : 1980   MD: Chico Murphy MD       Treatment Diagnosis: Other abnormalities of gait and mobility [R26.89]  Bilateral knee pain [M25.561, M25.562]  Start of Care: 2020    Visits from Start of Care: 16  Missed Visits: 0    Summary of Care: Patient evaluated and treated for B LE weakness and gait abnormality. Patient's chief complaint  is feeling of LLE giving out on her, which she does not feel has improved since beginning PT treatment. Pt. also c/o L lateral thigh pain, but reports this has improved with use of rollator vs. SPC and with use of ibuprofen. Patient demonstrates moderate improvement in bed mobility and minimal improvement in LE strength (no significant change since last progress note). Continues to present with severe deficit in B hip strength. Think limited progress due to severe strength deficits at initial evaluation and MS diagnosis. Improved FOTO score from 40 to 54 since initial evaluation. Assessment / Recommendations:   Progress towards goals / Updated goals:  Pt will be independent in HEP in order to maintain gains made throughout PT episode. Met  Pt will demonstrate B ankle AROM DF to at least 0 in prep for improved gait mechanics. Making progress towards  Pt will demonstrate at least 4/5 strength for all LE MMT in prep for improved functional mobility. Not met     Long Term Goals: To be accomplished in 32 treatments:  Pt will demonstrate only mild difficulty with all bed mobility and transfers in order to increase functional mobility. Making progress towards  Pt will demonstrate passing score on appropriate balance test in order to decrease fall risk. Not met  Pt will demonstrate ability to ambulate 500ft.  With appropriate AD with minimal difficulty in order to allow for community ambulation. Increased difficulty today with knees locking out into extension while walking    Other: Please advise with recommendations. Farhad Cochran , PT, DPT, OCS, CMTPT 10/5/2020      ________________________________________________________________________  NOTE TO PHYSICIAN:  Please complete the following and fax to: [facility]: Fax: [phone]. Majorkandice Casiano Retain this original for your records. If you are unable to process this request in 24 hours, please contact our office.        ____ I have read the above report and request that my patient continue therapy with the following changes/special instructions:  ____ I have read the above report and request that my patient be discharged from therapy    Physician's Signature:_________________ Date:___________Time:__________

## 2020-10-05 NOTE — PROGRESS NOTES
PT DAILY TREATMENT NOTE - [facility] 2-15     Patient Name: Pako August  Date:10/5/2020  : 1980  [x]  Patient  Verified  Payor: Verna Landry / Plan: [facility] [employer] MEDICARE CHOICE PPO/PFFS / Product Type: Managed Care Medicare /    In time:1:30p  Out time: 2:30p  Total Treatment Time (min): 60  Total Timed Codes (min): 45  1:1 Treatment Time ( [address] only): 45   Visit #: 16      Treatment Area: Other abnormalities of gait and mobility [R26.89]  Bilateral knee pain [M25.561, M25.562]    SUBJECTIVE  Pain Level (0-10 scale): 2/10  Any medication changes, allergies to medications, adverse drug reactions, diagnosis change, or new procedure performed?: [x] No    [] Yes (see summary sheet for update)  Subjective functional status/changes:   [] No changes reported  Knees locking while she is walking today. Woke up feeling tired. OBJECTIVE    Modality rationale: decrease inflammation, decrease pain and increase tissue extensibility to improve the patients ability to participate in daily activities without limitation.     Min Type Additional Details    [] Estim: []Att   []Unatt        []TENS instruct                  []IFC  []Premod   []NMES                     []Other:  []w/US   []w/ice   []w/heat  Position:  Location:    []  Traction: [] Cervical       []Lumbar                       [] Prone          []Supine                       []Intermittent   []Continuous Lbs:  [] before manual  [] after manual  []w/heat    []  Ultrasound: []Continuous   [] Pulsed at:                           []1MHz   []3MHz Location:  W/cm2:    [] Paraffin         Location:   []w/heat   15 []  Ice     [x]  Heat  []  Ice massage Position: sidelying  Location: L hip    []  Laser  []  Other: Position:  Location:      []  Vasopneumatic Device Pressure:       [] lo [] med [] hi   Temperature:      [x] Skin assessment post-treatment:  [x]intact []redness- no adverse reaction    []redness  adverse reaction:     35 min Therapeutic Exercise:  [x] See flow sheet :   Rationale: increase ROM, increase strength and improve coordination to improve the patients ability to participate in daily activities without limitation or an increase in pain. 10 min Manual Therapy:  STM/MFR L glute med, FR ITB   Rationale: decrease pain, increase ROM, increase tissue extensibility and decrease trigger points to improve the patients ability to participate in daily activities.              With   [x] TE   [] TA   [] neuro   [] other: Patient Education: [x] Review HEP    [] Progressed/Changed HEP based on:   [] positioning   [] body mechanics   [] transfers   [] heat/ice application    [] other:      Other Objective/Functional Measures:  Lower Extremity PROM:                                                                     R                      L                                 Ankle DF                     -5                  -10        LOWER QUARTER                            MUSCLE STRENGTH  KEY                                                                             R                      L  0 - No Contraction                   L1, L2 Psoas               2/5                   2-/5  1 - Trace                                  L3 Quads                    4/5                  4/5                    2 - Poor                                   L4 Tib Ant                    4-/5                 4-/5                   3 - Fair                                     L5 EHL                        3+/5     -           3+/5       4 - Good                                  S1 FHL                        3+/5                 3+/5  5 - Normal                               S2 Hams                     3+/5                 3+/5                                              MMT: See above  Hip abduction <3/5 B  Gluts <3/5 B  HS 3+/5 B     Pain Level (0-10 scale) post treatment: 0/10    ASSESSMENT/Changes in Function:     Patient will continue to benefit from skilled PT services to modify and progress therapeutic interventions, address functional mobility deficits, address ROM deficits, address strength deficits, analyze and address soft tissue restrictions, analyze and cue movement patterns, assess and modify postural abnormalities and address imbalance/dizziness to attain remaining goals. []  See Plan of Care  [x]  See progress note/recertification  []  See Discharge Summary         Progress towards goals / Updated goals:  Progress towards goals / Updated goals:  Pt will be independent in HEP in order to maintain gains made throughout PT episode. Met  Pt will demonstrate B ankle AROM DF to at least 0 in prep for improved gait mechanics. Making progress towards  Pt will demonstrate at least 4/5 strength for all LE MMT in prep for improved functional mobility. Not met     Long Term Goals: To be accomplished in 32 treatments:  Pt will demonstrate only mild difficulty with all bed mobility and transfers in order to increase functional mobility. Making progress towards  Pt will demonstrate passing score on appropriate balance test in order to decrease fall risk. Not met  Pt will demonstrate ability to ambulate 500ft. With appropriate AD with minimal difficulty in order to allow for community ambulation.  Increased difficulty today with knees locking out into extension while walking    PLAN  [x]  Upgrade activities as tolerated     [x]  Continue plan of care  [x]  Update interventions per flow sheet       []  Discharge due to:_  []  Other:_neurology appointment tomorrow    Oswald Kincaid , PT, DPT, OCS, CMTPT 10/5/2020

## 2020-10-06 DIAGNOSIS — G35 MS (MULTIPLE SCLEROSIS) (HCC): ICD-10-CM

## 2020-10-06 DIAGNOSIS — R26.9 GAIT ABNORMALITY: ICD-10-CM

## 2020-10-06 DIAGNOSIS — Z51.81 MEDICATION MONITORING ENCOUNTER: ICD-10-CM

## 2020-10-09 LAB
ALBUMIN SERPL-MCNC: 4.5 G/DL (ref 3.8–4.8)
ALBUMIN/GLOB SERPL: 1.4 {RATIO} (ref 1.2–2.2)
ALP SERPL-CCNC: 85 IU/L (ref 39–117)
ALT SERPL-CCNC: 17 IU/L (ref 0–32)
AST SERPL-CCNC: 21 IU/L (ref 0–40)
BASOPHILS # BLD AUTO: 0.1 X10E3/UL (ref 0–0.2)
BASOPHILS NFR BLD AUTO: 2 %
BILIRUB SERPL-MCNC: 0.4 MG/DL (ref 0–1.2)
BUN SERPL-MCNC: 11 MG/DL (ref 6–20)
BUN/CREAT SERPL: 16 (ref 9–23)
CALCIUM SERPL-MCNC: 9.6 MG/DL (ref 8.7–10.2)
CHLORIDE SERPL-SCNC: 100 MMOL/L (ref 96–106)
CO2 SERPL-SCNC: 23 MMOL/L (ref 20–29)
CREAT SERPL-MCNC: 0.69 MG/DL (ref 0.57–1)
EOSINOPHIL # BLD AUTO: 0.1 X10E3/UL (ref 0–0.4)
EOSINOPHIL NFR BLD AUTO: 1 %
ERYTHROCYTE [DISTWIDTH] IN BLOOD BY AUTOMATED COUNT: 12.8 % (ref 11.7–15.4)
GLOBULIN SER CALC-MCNC: 3.2 G/DL (ref 1.5–4.5)
GLUCOSE SERPL-MCNC: 92 MG/DL (ref 65–99)
HCT VFR BLD AUTO: 36.2 % (ref 34–46.6)
HGB BLD-MCNC: 12.7 G/DL (ref 11.1–15.9)
IMM GRANULOCYTES # BLD AUTO: 0 X10E3/UL (ref 0–0.1)
IMM GRANULOCYTES NFR BLD AUTO: 0 %
LYMPHOCYTES # BLD AUTO: 2 X10E3/UL (ref 0.7–3.1)
LYMPHOCYTES NFR BLD AUTO: 31 %
MCH RBC QN AUTO: 28.9 PG (ref 26.6–33)
MCHC RBC AUTO-ENTMCNC: 35.1 G/DL (ref 31.5–35.7)
MCV RBC AUTO: 82 FL (ref 79–97)
MONOCYTES # BLD AUTO: 0.5 X10E3/UL (ref 0.1–0.9)
MONOCYTES NFR BLD AUTO: 8 %
NEUTROPHILS # BLD AUTO: 3.7 X10E3/UL (ref 1.4–7)
NEUTROPHILS NFR BLD AUTO: 58 %
PLATELET # BLD AUTO: 381 X10E3/UL (ref 150–450)
POTASSIUM SERPL-SCNC: 4.3 MMOL/L (ref 3.5–5.2)
PROT SERPL-MCNC: 7.7 G/DL (ref 6–8.5)
RBC # BLD AUTO: 4.4 X10E6/UL (ref 3.77–5.28)
SODIUM SERPL-SCNC: 139 MMOL/L (ref 134–144)
WBC # BLD AUTO: 6.3 X10E3/UL (ref 3.4–10.8)

## 2020-10-12 ENCOUNTER — HOSPITAL ENCOUNTER (OUTPATIENT)
Dept: PHYSICAL THERAPY | Age: 40
Discharge: HOME | End: 2020-10-12
Payer: MEDICARE

## 2020-10-12 PROCEDURE — 97110 THERAPEUTIC EXERCISES: CPT

## 2020-10-12 PROCEDURE — 97140 MANUAL THERAPY 1/> REGIONS: CPT

## 2020-10-12 NOTE — PROGRESS NOTES
PT DAILY TREATMENT NOTE - [facility] 2-15     Patient Name: Montez Fulling  Date:10/12/2020  : 1980  [x]  Patient  Verified  Payor: Belia Rodriguez / Plan: [facility] [employer] MEDICARE CHOICE PPO/PFFS / Product Type: Managed Care Medicare /    In time:1:30p  Out time: 2:30p  Total Treatment Time (min): 60  Total Timed Codes (min): 45  1:1 Treatment Time ( [address] only): 45   Visit #: 17      Treatment Area: Other abnormalities of gait and mobility [R26.89]  Bilateral knee pain [M25.561, M25.562]    SUBJECTIVE  Pain Level (0-10 scale): 2/10  Any medication changes, allergies to medications, adverse drug reactions, diagnosis change, or new procedure performed?: [x] No    [] Yes (see summary sheet for update)  Subjective functional status/changes:   [] No changes reported  Saw MD, would like her to continue PT if she feels like it is helping. Pt states that she thinks PT is helping and would like to continue at this time. OBJECTIVE    Modality rationale: decrease inflammation, decrease pain and increase tissue extensibility to improve the patients ability to participate in daily activities without limitation.     Min Type Additional Details    [] Estim: []Att   []Unatt        []TENS instruct                  []IFC  []Premod   []NMES                     []Other:  []w/US   []w/ice   []w/heat  Position:  Location:    []  Traction: [] Cervical       []Lumbar                       [] Prone          []Supine                       []Intermittent   []Continuous Lbs:  [] before manual  [] after manual  []w/heat    []  Ultrasound: []Continuous   [] Pulsed at:                           []1MHz   []3MHz Location:  W/cm2:    [] Paraffin         Location:   []w/heat   15 []  Ice     [x]  Heat  []  Ice massage Position: sidelying  Location: L hip    []  Laser  []  Other: Position:  Location:      []  Vasopneumatic Device Pressure:       [] lo [] med [] hi   Temperature:      [x] Skin assessment post-treatment:  [x]intact []redness- no adverse reaction    []redness  adverse reaction:     35 min Therapeutic Exercise:  [x] See flow sheet :   Rationale: increase ROM, increase strength and improve coordination to improve the patients ability to participate in daily activities without limitation or an increase in pain. 10 min Manual Therapy:  STM/MFR L glute med/TFL, FR ITB   Rationale: decrease pain, increase ROM, increase tissue extensibility and decrease trigger points to improve the patients ability to participate in daily activities.              With   [x] TE   [] TA   [] neuro   [] other: Patient Education: [x] Review HEP    [] Progressed/Changed HEP based on:   [] positioning   [] body mechanics   [] transfers   [] heat/ice application    [] other:      Other Objective/Functional Measures: Increased tension/ttp L glute med, TFL, ITB  Lower Extremity PROM:                                                                     R                      L                                 Ankle DF                     -5                  -10        LOWER QUARTER                            MUSCLE STRENGTH  KEY                                                                             R                      L  0 - No Contraction                   L1, L2 Psoas               2/5                   2-/5  1 - Trace                                  L3 Quads                    4/5                  4/5                    2 - Poor                                   L4 Tib Ant                    4-/5                 4-/5                   3 - Fair                                     L5 EHL                        3+/5     -           3+/5       4 - Good                                  S1 FHL                        3+/5                 3+/5  5 - Normal                               S2 Hams                     3+/5                 3+/5                                              MMT: See above  Hip abduction <3/5 B  Gluts <3/5 B  HS 3+/5 B     Pain Level (0-10 scale) post treatment: 0/10    ASSESSMENT/Changes in Function:  Good glute med activation with therex today. Patient will continue to benefit from skilled PT services to modify and progress therapeutic interventions, address functional mobility deficits, address ROM deficits, address strength deficits, analyze and address soft tissue restrictions, analyze and cue movement patterns, assess and modify postural abnormalities and address imbalance/dizziness to attain remaining goals. []  See Plan of Care  [x]  See progress note/recertification  []  See Discharge Summary         Progress towards goals / Updated goals:  Progress towards goals / Updated goals:  Pt will be independent in HEP in order to maintain gains made throughout PT episode. Met  Pt will demonstrate B ankle AROM DF to at least 0 in prep for improved gait mechanics. Making progress towards  Pt will demonstrate at least 4/5 strength for all LE MMT in prep for improved functional mobility. Not met     Long Term Goals: To be accomplished in 32 treatments:  Pt will demonstrate only mild difficulty with all bed mobility and transfers in order to increase functional mobility. Making progress towards  Pt will demonstrate passing score on appropriate balance test in order to decrease fall risk. Not met  Pt will demonstrate ability to ambulate 500ft. With appropriate AD with minimal difficulty in order to allow for community ambulation.  Increased difficulty today with knees locking out into extension while walking    PLAN  [x]  Upgrade activities as tolerated     [x]  Continue plan of care  [x]  Update interventions per flow sheet       []  Discharge due to:_  []  Other:_    Juice Brennan , PT, DPT, OCS, CMTPT 10/12/2020

## 2020-10-26 ENCOUNTER — HOSPITAL ENCOUNTER (OUTPATIENT)
Dept: PHYSICAL THERAPY | Age: 40
Discharge: HOME | End: 2020-10-26
Payer: MEDICARE

## 2020-10-26 PROCEDURE — 97110 THERAPEUTIC EXERCISES: CPT

## 2020-10-26 PROCEDURE — 97140 MANUAL THERAPY 1/> REGIONS: CPT

## 2020-10-26 NOTE — PROGRESS NOTES
PT DAILY TREATMENT NOTE - [facility] 2-15     Patient Name: Yeny Campa  Date:10/26/2020  : 1980  [x]  Patient  Verified  Payor: Quoc Cristina / Plan: [facility] [employer] MEDICARE CHOICE PPO/PFFS / Product Type: Managed Care Medicare /    In time:1:30p  Out time: 2:30p  Total Treatment Time (min): 60  Total Timed Codes (min): 45  1:1 Treatment Time ( [address] only): 45   Visit #: 18      Treatment Area: Other abnormalities of gait and mobility [R26.89]  Bilateral knee pain [M25.561, M25.562]    SUBJECTIVE  Pain Level (0-10 scale): 2/10  Any medication changes, allergies to medications, adverse drug reactions, diagnosis change, or new procedure performed?: [x] No    [] Yes (see summary sheet for update)  Subjective functional status/changes:   [] No changes reported  For the most part has been doing pretty good. OBJECTIVE    Modality rationale: decrease pain and increase tissue extensibility to improve the patients ability to participate in daily activities without limitation.     Min Type Additional Details    [] Estim: []Att   []Unatt        []TENS instruct                  []IFC  []Premod   []NMES                     []Other:  []w/US   []w/ice   []w/heat  Position:  Location:    []  Traction: [] Cervical       []Lumbar                       [] Prone          []Supine                       []Intermittent   []Continuous Lbs:  [] before manual  [] after manual  []w/heat    []  Ultrasound: []Continuous   [] Pulsed at:                           []1MHz   []3MHz Location:  W/cm2:    [] Paraffin         Location:   []w/heat   15 []  Ice     [x]  Heat  []  Ice massage Position: sidelying  Location: L hip    []  Laser  []  Other: Position:  Location:      []  Vasopneumatic Device Pressure:       [] lo [] med [] hi   Temperature:      [x] Skin assessment post-treatment:  [x]intact []redness- no adverse reaction    []redness  adverse reaction:     35 min Therapeutic Exercise:  [x] See flow sheet :   Rationale: increase ROM, increase strength and improve coordination to improve the patients ability to participate in daily activities without limitation or an increase in pain. 10 min Manual Therapy:  STM/MFR L glute med/TFL, FR ITB   Rationale: decrease pain, increase ROM, increase tissue extensibility and decrease trigger points to improve the patients ability to participate in daily activities.              With   [x] TE   [] TA   [] neuro   [] other: Patient Education: [x] Review HEP    [] Progressed/Changed HEP based on:   [] positioning   [] body mechanics   [] transfers   [] heat/ice application    [] other:      Other Objective/Functional Measures: Increased tension/ttp L glute med, TFL, ITB  Lower Extremity PROM:                                                                     R                      L                                 Ankle DF                     -5                  -5        LOWER QUARTER                            MUSCLE STRENGTH  KEY                                                                             R                      L  0 - No Contraction                   L1, L2 Psoas               2+/5                 2/5  1 - Trace                                  L3 Quads                    4/5                   4/5                    2 - Poor                                   L4 Tib Ant                    4/5                    4/5                   3 - Fair                                     L5 EHL                        3+/5     -           3+/5       4 - Good                                  S1 FHL                        3+/5                 3+/5  5 - Normal                               S2 Hams                      4/5                    4-/5                                              MMT: See above  Hip abduction 3/5 B  Gluts 3/5 B     Walk Test: 41 ft with rollator prior to pain, R knee buckling    Pain Level (0-10 scale) post treatment: 0/10    ASSESSMENT/Changes in Function:   Patient will continue to benefit from skilled PT services to modify and progress therapeutic interventions, address functional mobility deficits, address ROM deficits, address strength deficits, analyze and address soft tissue restrictions, analyze and cue movement patterns, assess and modify postural abnormalities and address imbalance/dizziness to attain remaining goals. []  See Plan of Care  [x]  See progress note/recertification  []  See Discharge Summary           Progress towards goals / Updated goals:  Pt will be independent in HEP in order to maintain gains made throughout PT episode. Met  Pt will demonstrate B ankle AROM DF to at least 0 in prep for improved gait mechanics. Making progress towards  Pt will demonstrate at least 4/5 strength for all LE MMT in prep for improved functional mobility. Making progress towards     Long Term Goals: To be accomplished in 32 treatments:  Pt will demonstrate only mild difficulty with all bed mobility and transfers in order to increase functional mobility. Making progress towards  Pt will demonstrate passing score on appropriate balance test in order to decrease fall risk. Not met  Pt will demonstrate ability to ambulate 500ft. With appropriate AD with minimal difficulty in order to allow for community ambulation.  39 ft with rollator prior to pain, R knee buckling    PLAN  [x]  Upgrade activities as tolerated     [x]  Continue plan of care  [x]  Update interventions per flow sheet       []  Discharge due to:_  []  Other:_    Nithin Joshi , PT, DPT, OCS, CMTPT 10/26/2020

## 2020-10-26 NOTE — PROGRESS NOTES
[facility]  [address] [facility] Stephanie Pascual  Phone: [phone]      Fax:  [phone]    Continued Plan of Care/ Re-certification for Physical Therapy Ian Bernal MD   Provider # [id number]                    Diagnosis: Other abnormalities of gait and mobility [R26.89]  Bilateral knee pain [M25.561, M25.562]  Onset Date: January 2020  Prior Hospitalization: see medical history     Visits from Start of Care: 18     Missed Visits: 0  Start of Care: 7/20/2020  Prior Level of Function: no AD    The Plan of Care and following information is based on the patient's current status:  Pt will be independent in HEP in order to maintain gains made throughout PT episode. Met  Pt will demonstrate B ankle AROM DF to at least 0 in prep for improved gait mechanics. Making progress towards  Pt will demonstrate at least 4/5 strength for all LE MMT in prep for improved functional mobility. Making progress towards     Long Term Goals: To be accomplished in 32 treatments:  Pt will demonstrate only mild difficulty with all bed mobility and transfers in order to increase functional mobility. Making progress towards  Pt will demonstrate passing score on appropriate balance test in order to decrease fall risk. Not met  Pt will demonstrate ability to ambulate 500ft. With appropriate AD with minimal difficulty in order to allow for community ambulation. 39 ft with rollator prior to pain, R knee buckling    Key functional changes: Significant improvement in LE strength, especially distally. Primary deficit continues to be B hip strength.       Problems/ barriers to goal attainment: severe weakness, MS     Problem List: pain affecting function, decrease ROM, decrease strength, impaired gait/ balance, decrease ADL/ functional abilitiies, decrease activity tolerance, decrease flexibility/ joint mobility and decrease transfer abilities    Treatment Plan: Therapeutic exercise, Therapeutic activities, Neuromuscular re-education, Physical agent/modality, Gait/balance training, Manual therapy, Patient education, Functional mobility training, Home safety training and Stair training     Patient Goal (s) has been updated and includes: decrease pain, walk without pain, be able to transfer sit to stand without difficulty           Goals for this certification period to be accomplished in 12 treatments:  See above    Frequency / Duration: Patient to be seen 1 times per week for 12 weeks:    Assessment / Recommendations:Patient to benefit from continued skilled PT with focus on LE strengthening in order to improve transfer ability and gait mechanics. Certification Period: 90 days    Marsha Foss , PT, DPT, OCS, CMTPT 10/26/2020    ________________________________________________________________________  I certify that the above Therapy Services are being furnished while the patient is under my care. I agree with the treatment plan and certify that this therapy is necessary. Y or N I have read the above and request that my patient continue as recommended.   Y or N I have read the above report and request that my patient continue therapy with the following changes/special instructions  Y or N I have read the above report and request that my patient be discharged from therapy    Physician's Signature:_________________ Date:___________Time:__________

## 2020-11-02 ENCOUNTER — HOSPITAL ENCOUNTER (OUTPATIENT)
Dept: PHYSICAL THERAPY | Age: 40
Discharge: HOME | End: 2020-11-02
Payer: MEDICARE

## 2020-11-02 PROCEDURE — 97110 THERAPEUTIC EXERCISES: CPT

## 2020-11-02 PROCEDURE — 97140 MANUAL THERAPY 1/> REGIONS: CPT

## 2020-11-02 NOTE — PROGRESS NOTES
PT DAILY TREATMENT NOTE - [facility] 2-15     Patient Name: Michael Lozano  Date:2020  : 1980  [x]  Patient  Verified  Payor: Andreas Alejandro / Plan: [facility] HUMANA MEDICARE CHOICE PPO/PFFS / Product Type: Managed Care Medicare /    In time:1:30p  Out time: 2:30p  Total Treatment Time (min): 60  Total Timed Codes (min): 45  1:1 Treatment Time ([facility] [place] [place] only): 45   Visit #: 19      Treatment Area: Other abnormalities of gait and mobility [R26.89]  Bilateral knee pain [M25.561, M25.562]    SUBJECTIVE  Pain Level (0-10 scale): 2/10  Any medication changes, allergies to medications, adverse drug reactions, diagnosis change, or new procedure performed?: [x] No    [] Yes (see summary sheet for update)  Subjective functional status/changes:   [] No changes reported  Left leg leg began dragging in the middle of last week, due to unknown reason. OBJECTIVE    Modality rationale: decrease pain and increase tissue extensibility to improve the patients ability to participate in daily activities without limitation.     Min Type Additional Details    [] Estim: []Att   []Unatt        []TENS instruct                  []IFC  []Premod   []NMES                     []Other:  []w/US   []w/ice   []w/heat  Position:  Location:    []  Traction: [] Cervical       []Lumbar                       [] Prone          []Supine                       []Intermittent   []Continuous Lbs:  [] before manual  [] after manual  []w/heat    []  Ultrasound: []Continuous   [] Pulsed at:                           []1MHz   []3MHz Location:  W/cm2:    [] Paraffin         Location:   []w/heat   15 []  Ice     [x]  Heat  []  Ice massage Position: sidelying  Location: L hip    []  Laser  []  Other: Position:  Location:      []  Vasopneumatic Device Pressure:       [] lo [] med [] hi   Temperature:      [x] Skin assessment post-treatment:  [x]intact []redness- no adverse reaction    []redness  adverse reaction:     25 min Therapeutic Exercise:  [x] See flow sheet :   Rationale: increase ROM, increase strength and improve coordination to improve the patients ability to participate in daily activities without limitation or an increase in pain. 20 min Manual Therapy:  STM/MFR L glute med, FR ITB, stick B gastroc/soleus. L inferior hip mobs with belt. Supine hamstring stretch. Rationale: decrease pain, increase ROM, increase tissue extensibility and decrease trigger points to improve the patients ability to participate in daily activities. With   [x] TE   [] TA   [] neuro   [] other: Patient Education: [x] Review HEP    [] Progressed/Changed HEP based on:   [] positioning   [] body mechanics   [] transfers   [] heat/ice application    [] other:      Other Objective/Functional Measures: Increased tension/ttp L glute med, TFL, ITB; B gastroc/soleus  Moderate deficit B calf flexibility. Mild deficit L hip PROM flexion p! Unable to perform L SLR flexion on the left     Pain Level (0-10 scale) post treatment: 0/10    ASSESSMENT/Changes in Function:   Improved L hip flexion after hip mobilization. Deficit B calf flexibility and decreased L hip flexor strength contributing to poor gait mechanics ie decreased foot clearance. Improved foot clearance, perception of L foot dragging with gait after manual/calf stretching. Instructed pt to perform calf stretching 2-3x/day in order to promote improved gait mechanics. Patient will continue to benefit from skilled PT services to modify and progress therapeutic interventions, address functional mobility deficits, address ROM deficits, address strength deficits, analyze and address soft tissue restrictions, analyze and cue movement patterns, assess and modify postural abnormalities and address imbalance/dizziness to attain remaining goals.      []  See Plan of Care  []  See progress note/recertification  []  See Discharge Summary           Progress towards goals / Updated goals:  Pt will be independent in HEP in order to maintain gains made throughout PT episode. Met  Pt will demonstrate B ankle AROM DF to at least 0 in prep for improved gait mechanics. Making progress towards  Pt will demonstrate at least 4/5 strength for all LE MMT in prep for improved functional mobility. Making progress towards     Long Term Goals: To be accomplished in 32 treatments:  Pt will demonstrate only mild difficulty with all bed mobility and transfers in order to increase functional mobility. Making progress towards  Pt will demonstrate passing score on appropriate balance test in order to decrease fall risk. Not met  Pt will demonstrate ability to ambulate 500ft. With appropriate AD with minimal difficulty in order to allow for community ambulation.  39 ft with rollator prior to pain, R knee buckling    PLAN  [x]  Upgrade activities as tolerated     [x]  Continue plan of care  [x]  Update interventions per flow sheet       []  Discharge due to:_  []  Other:_    Manuel Romero , PT, DPT, OCS, CMTPT 11/2/2020

## 2020-11-09 ENCOUNTER — HOSPITAL ENCOUNTER (OUTPATIENT)
Dept: PHYSICAL THERAPY | Age: 40
Discharge: HOME | End: 2020-11-09
Payer: MEDICARE

## 2020-11-09 PROCEDURE — 97140 MANUAL THERAPY 1/> REGIONS: CPT

## 2020-11-09 PROCEDURE — 97110 THERAPEUTIC EXERCISES: CPT

## 2020-11-09 NOTE — PROGRESS NOTES
PT DAILY TREATMENT NOTE - [facility] 2-15     Patient Name: Fransisco Barbosa  Date:2020  : 1980  [x]  Patient  Verified  Payor: Tommi Shone / Plan: [facility] MEDICARE CHOICE PPO/PFFS / Product Type: Managed Care Medicare /    In time:1:30p  Out time: 2:30p  Total Treatment Time (min): 60  Total Timed Codes (min): 45  1:1 Treatment Time ([facility] only): 45   Visit #: 20      Treatment Area: Other abnormalities of gait and mobility [R26.89]  Bilateral knee pain [M25.561, M25.562]    SUBJECTIVE  Pain Level (0-10 scale): 1/10  Any medication changes, allergies to medications, adverse drug reactions, diagnosis change, or new procedure performed?: [x] No    [] Yes (see summary sheet for update)  Subjective functional status/changes:   [] No changes reported  Continues to be difficult to put on shoes/socks on LLE ans transfer sit to stand from low chair. Pt inquiring about seeing a pain specialist.     OBJECTIVE    Modality rationale: decrease pain and increase tissue extensibility to improve the patients ability to participate in daily activities without limitation.     Min Type Additional Details    [] Estim: []Att   []Unatt        []TENS instruct                  []IFC  []Premod   []NMES                     []Other:  []w/US   []w/ice   []w/heat  Position:  Location:    []  Traction: [] Cervical       []Lumbar                       [] Prone          []Supine                       []Intermittent   []Continuous Lbs:  [] before manual  [] after manual  []w/heat    []  Ultrasound: []Continuous   [] Pulsed at:                           []1MHz   []3MHz Location:  W/cm2:    [] Paraffin         Location:   []w/heat   15 []  Ice     [x]  Heat  []  Ice massage Position: sidelying  Location: L hip    []  Laser  []  Other: Position:  Location:      []  Vasopneumatic Device Pressure:       [] lo [] med [] hi   Temperature:      [x] Skin assessment post-treatment:  [x]intact []redness- no adverse reaction    []redness  adverse reaction:     30 min Therapeutic Exercise:  [x] See flow sheet :   Rationale: increase ROM, increase strength and improve coordination to improve the patients ability to participate in daily activities without limitation or an increase in pain. 15 min Manual Therapy:  STM/MFR L glute med, FR ITB. L inferior hip mobs with belt. Rationale: decrease pain, increase ROM, increase tissue extensibility and decrease trigger points to improve the patients ability to participate in daily activities. With   [x] TE   [] TA   [] neuro   [] other: Patient Education: [x] Review HEP    [] Progressed/Changed HEP based on:   [] positioning   [x] body mechanics   [] transfers   [x] heat/ice application    [] other:      Other Objective/Functional Measures: Increased tension/ttp L glute med, TFL, ITB. Mild deficit L hip PROM flexion, ER p! Pain Level (0-10 scale) post treatment: 0/10    ASSESSMENT/Changes in Function:   Suggested patient see hip orthopedic secondary to continued L hip pain. Patient will continue to benefit from skilled PT services to modify and progress therapeutic interventions, address functional mobility deficits, address ROM deficits, address strength deficits, analyze and address soft tissue restrictions, analyze and cue movement patterns, assess and modify postural abnormalities and address imbalance/dizziness to attain remaining goals. []  See Plan of Care  []  See progress note/recertification  []  See Discharge Summary           Progress towards goals / Updated goals:  Pt will be independent in HEP in order to maintain gains made throughout PT episode. Met  Pt will demonstrate B ankle AROM DF to at least 0 in prep for improved gait mechanics. Making progress towards  Pt will demonstrate at least 4/5 strength for all LE MMT in prep for improved functional mobility. Making progress towards     Long Term Goals:  To be accomplished in 32 treatments:  Pt will demonstrate only mild difficulty with all bed mobility and transfers in order to increase functional mobility. Making progress towards  Pt will demonstrate passing score on appropriate balance test in order to decrease fall risk. Not met  Pt will demonstrate ability to ambulate 500ft. With appropriate AD with minimal difficulty in order to allow for community ambulation.  39 ft with rollator prior to pain, R knee buckling    PLAN  [x]  Upgrade activities as tolerated     [x]  Continue plan of care  [x]  Update interventions per flow sheet       []  Discharge due to:_  []  Other:_Recommended pt to make appointment with Dr. Manuel Solis , PT, DPT, OCS, CMTPT 11/9/2020

## 2020-11-16 ENCOUNTER — HOSPITAL ENCOUNTER (OUTPATIENT)
Dept: PHYSICAL THERAPY | Age: 40
Discharge: HOME | End: 2020-11-16
Payer: MEDICARE

## 2020-11-16 PROCEDURE — 97140 MANUAL THERAPY 1/> REGIONS: CPT

## 2020-11-16 PROCEDURE — 97110 THERAPEUTIC EXERCISES: CPT

## 2020-11-16 NOTE — PROGRESS NOTES
PT DAILY TREATMENT NOTE - [facility] 2-15     Patient Name: Evan Ponce  Date:2020  : 1980  [x]  Patient  Verified  Payor: Kyle Hernandez / Plan: [facility] HU[employer] MEDICARE CHOICE PPO/PFFS / Product Type: Managed Care Medicare /    In time:1:30p  Out time: 2:30p  Total Treatment Time (min): 60  Total Timed Codes (min): 45   1:1 Treatment Time ([facility] only): 45   Visit #: 21      Treatment Area: Other abnormalities of gait and mobility [R26.89]  Bilateral knee pain [M25.561, M25.562]    SUBJECTIVE  Pain Level (0-10 scale): 2/10  Any medication changes, allergies to medications, adverse drug reactions, diagnosis change, or new procedure performed?: [x] No    [] Yes (see summary sheet for update)  Subjective functional status/changes:   [] No changes reported  Going to switch back to Dr. Kalyn Carbone (old neurologist) with [facility]. Has a virtual appointment this Thursday. Made appointment with orthopedic hip specialist as well, but has to reschedule appointment so probably won't be until the first week of December. OBJECTIVE    Modality rationale: decrease pain and increase tissue extensibility to improve the patients ability to participate in daily activities without limitation.     Min Type Additional Details    [] Estim: []Att   []Unatt        []TENS instruct                  []IFC  []Premod   []NMES                     []Other:  []w/US   []w/ice   []w/heat  Position:  Location:    []  Traction: [] Cervical       []Lumbar                       [] Prone          []Supine                       []Intermittent   []Continuous Lbs:  [] before manual  [] after manual  []w/heat    []  Ultrasound: []Continuous   [] Pulsed at:                           []1MHz   []3MHz Location:  W/cm2:    [] Paraffin         Location:   []w/heat   15 []  Ice     [x]  Heat  []  Ice massage Position: sidelying  Location: L hip    []  Laser  []  Other: Position:  Location:      []  Vasopneumatic Device Pressure:       [] lo [] med [] hi   Temperature:      [x] Skin assessment post-treatment:  [x]intact []redness- no adverse reaction    []redness - adverse reaction:     30 min Therapeutic Exercise:  [x] See flow sheet :   Rationale: increase ROM, increase strength and improve coordination to improve the patients ability to participate in daily activities without limitation or an increase in pain. 15 min Manual Therapy:  STM/MFR L glute med, FR ITB. Rationale: decrease pain, increase ROM, increase tissue extensibility and decrease trigger points to improve the patients ability to participate in daily activities. With   [x] TE   [] TA   [] neuro   [] other: Patient Education: [x] Review HEP    [] Progressed/Changed HEP based on:   [] positioning   [x] body mechanics   [] transfers   [x] heat/ice application    [] other:      Other Objective/Functional Measures: Increased tension/ttp L glute med, ITB. Pain Level (0-10 scale) post treatment: 0/10    ASSESSMENT/Changes in Function:   Have reached plateau with progress. Discussed plan to continue with HEP and await results of MD appointments, pt in agreement. Reviewed HEP, pt demonstrates understanding. Patient will continue to benefit from skilled PT services to modify and progress therapeutic interventions, address functional mobility deficits, address ROM deficits, address strength deficits, analyze and address soft tissue restrictions, analyze and cue movement patterns, assess and modify postural abnormalities and address imbalance/dizziness to attain remaining goals. []  See Plan of Care  []  See progress note/recertification  []  See Discharge Summary           Progress towards goals / Updated goals:  Pt will be independent in HEP in order to maintain gains made throughout PT episode. Met  Pt will demonstrate B ankle AROM DF to at least 0 in prep for improved gait mechanics.  Making progress towards  Pt will demonstrate at least 4/5 strength for all LE MMT in prep for improved functional mobility. Making progress towards     Long Term Goals: To be accomplished in 32 treatments:  Pt will demonstrate only mild difficulty with all bed mobility and transfers in order to increase functional mobility. Making progress towards  Pt will demonstrate passing score on appropriate balance test in order to decrease fall risk. Not met  Pt will demonstrate ability to ambulate 500ft. With appropriate AD with minimal difficulty in order to allow for community ambulation.  39 ft with rollator prior to pain, R knee buckling    PLAN  []  Upgrade activities as tolerated     [x]  Continue plan of care  []  Update interventions per flow sheet       []  Discharge due to:_  [x]  Other:_Hold - patient has virtual appointment with neurologist 11/19, to schedule ortho appointment     Juice Brennan , PT, DPT, OCS, CMTPT 11/16/2020

## 2020-11-23 ENCOUNTER — APPOINTMENT (OUTPATIENT)
Dept: PHYSICAL THERAPY | Age: 40
End: 2020-11-23
Payer: MEDICARE

## 2020-11-28 DIAGNOSIS — F41.9 ANXIETY: ICD-10-CM

## 2020-11-30 ENCOUNTER — APPOINTMENT (OUTPATIENT)
Dept: PHYSICAL THERAPY | Age: 40
End: 2020-11-30
Payer: MEDICARE

## 2020-12-02 RX ORDER — BENZTROPINE MESYLATE 0.5 MG/1
TABLET ORAL
Qty: 180 TAB | Refills: 3 | Status: SHIPPED | OUTPATIENT
Start: 2020-12-02 | End: 2021-12-21

## 2021-02-02 ENCOUNTER — HOSPITAL ENCOUNTER (OUTPATIENT)
Dept: PHYSICAL THERAPY | Age: 41
Discharge: HOME | End: 2021-02-02
Payer: MEDICARE

## 2021-02-02 PROCEDURE — 97162 PT EVAL MOD COMPLEX 30 MIN: CPT

## 2021-02-02 NOTE — PROGRESS NOTES
Physical Therapy at [facility],   a part of [facility]  [address] [place] [address] Stephanie Pascual  Phone: [phone]  Fax: [phone]    Discharge Summary  2-15    Patient name: Derrick Wade  : 1980  Provider#: [id number]  Referral source: Jair Murillo MD      Medical/Treatment Diagnosis: Other abnormalities of gait and mobility [R26.89]  Bilateral knee pain [M25.561, M25.562]     Prior Hospitalization: see medical history     Comorbidities: See Plan of Care  Prior Level of Function:See Plan of Care  Medications: Verified on Patient Summary List    Start of Care: 20      Onset Date:2020   Visits from Start of Care: 21     Missed Visits: 0  Reporting Period : 20 to 20      ASSESSMENT/SUMMARY OF CARE: See progress note on 10/26. Held PT while patient was seeking MD opinion. Progress towards goals / Updated goals:  Pt will be independent in HEP in order to maintain gains made throughout PT episode. Met  Pt will demonstrate B ankle AROM DF to at least 0 in prep for improved gait mechanics. Making progress towards  Pt will demonstrate at least 4/5 strength for all LE MMT in prep for improved functional mobility. Making progress towards     Long Term Goals: To be accomplished in 32 treatments:  Pt will demonstrate only mild difficulty with all bed mobility and transfers in order to increase functional mobility. Making progress towards  Pt will demonstrate passing score on appropriate balance test in order to decrease fall risk. Not met  Pt will demonstrate ability to ambulate 500ft.  With appropriate AD with minimal difficulty in order to allow for community ambulation. 41 ft with rollator prior to pain, R knee buckling      RECOMMENDATIONS:  [x]Discontinue therapy: []Patient has reached or is progressing toward set goals      []Patient is non-compliant or has abdicated      []Due to lack of appreciable progress towards set goals      [x]Other- patient to see MD Le Alfredo , PT, DPT, OCS, CMTPT 2/2/2021

## 2021-02-02 NOTE — PROGRESS NOTES
PT INITIAL EVALUATION NOTE - [facility] 2-15    Patient Name: Autumn Jaramillo  Date:2021  : 1980  [x]  Patient  Verified  Payor: Jerson Terrell / Plan: [facility] HU[employer]NA MEDICARE CHOICE PPO/PFFS / Product Type: Managed Care Medicare /    In time:3:15 pm  Out time:4:10  Total Treatment Time (min): 5  Total Timed Codes (min): 5  1:1 Treatment Time ( [address] only): 5   Visit #: 1     Treatment Area: Other abnormalities of gait and mobility [R26.89]  Bilateral knee pain [M25.561, M25.562]    SUBJECTIVE  Pain Level (0-10 scale): 3/10  Any medication changes, allergies to medications, adverse drug reactions, diagnosis change, or new procedure performed?: [] No    [x] Yes (see summary sheet for update)  Subjective:    Wants to get back in the swing of exercises. L lateral thigh pain had gone away and then began having pain again 2 days ago. Woke up yesterday and had the same pain on the R side. Feels like legs are weak, going to give out on her. Notices that she is bent over when she gets up from sitting, takes her awhile to stand up straight. Attributes this to weakness. Last saw neurologist (obi) Raman Rincon in either November or 2020. MD is think ing she may have progressive MS vs relapsing. Also c/o LLE dragging, noticed this occurring last week, especially in the morning when she gets up. No numbness/tingling.      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  100 p!    Knee Flexion  WNL  WNL     Knee Extension 10  10     Ankle DF  -12  -20    LOWER QUARTER   MUSCLE STRENGTH  KEY       R  L  0 - No Contraction  L1, L2 Psoas  <3/5  <3/5    1 - Trace   L3 Quads  4/5  4/5    2 - Poor   L4 Tib Ant  4/5  4-/5    3 - Fair    L5 EHL  4/5  4/5   4 - Good   S1 FHL  <3/5  <3/5    5 - Normal   S2 Hams  4-/5  4-/5            MMT: See above  Hip abduction <3/5 B  Gluts - able to lift hips slightly with bridge    Neurological: Sensation BLE's intact to light touch  Special Tests:    SLR- negative(severe deficit B hamstring flexibility)        5 min Therapeutic Exercise:  [x] See flow sheet :   Rationale: increase ROM to improve the patients ability to perform ADL's including transfers, standing, walking, and lifting activities      With   [x] TE   [] TA   [] Neuro   [] SC   [] other: Patient Education: [x] Review HEP    [] Progressed/Changed HEP based on:   [] positioning   [] body mechanics   [] transfers   [] heat/ice application    [] other:      Other Objective/Functional Measures: n/a    Pain Level (0-10 scale) post treatment: 3/10    ASSESSMENT/Changes in Function:     [x]  See Plan of Care      Chris Jesus , PT, DPT, OCS, CMTPT 2/2/2021

## 2021-02-02 NOTE — PROGRESS NOTES
Physical Therapy at [facility],   a part of  [facility]  [address]  Phone: [phone]  Fax: [phone]    Plan of Care/Statement of Necessity for Physical Therapy Services  2-15    Patient name: Jarred Levine  : 1980  Provider#: [id number]  Referral source: Bryant Gee MD     Medical/Treatment Diagnosis: Other abnormalities of gait and mobility [R26.89]  Bilateral knee pain [M25.561, M25.562]     Prior Hospitalization: see medical history     Comorbidities: Multiple Sclerosis  Prior Level of Function:uses SPC in home, rollator when she leaves the house   Medications: Verified on Patient Summary List  Start of Care: 20      Onset Date: 2020   The [address] [place] [address] and following information is based on the information from the initial evaluation. Assessment/ key information: Patient's chief complaints include decreased LE. Patient presents with the following deficits: decreased LE flexibility, poor ankle joint mobility, decreased B LE strength. Patient to benefit from skilled physical therapy in order to address deficits and maximize functional mobility. Evaluation Complexity History MEDIUM  Complexity : 1-2 comorbidities / personal factors will impact the outcome/ POC ; Examination HIGH Complexity : 4+ Standardized tests and measures addressing body structure, function, activity limitation and / or participation in recreation  ;Presentation MEDIUM Complexity : Evolving with changing characteristics  ; Clinical Decision Making MEDIUM Complexity : FOTO score of 26-74  Overall Complexity Rating: MEDIUM    Problem List: pain affecting function, decrease ROM, decrease strength, impaired gait/ balance, decrease ADL/ functional abilitiies, decrease activity tolerance, decrease flexibility/ joint mobility and decrease transfer abilities   Treatment Plan may include any combination of the following: Therapeutic exercise, Therapeutic activities, Neuromuscular re-education, Physical agent/modality, Gait/balance training, Manual therapy, Patient education, Self Care training, Functional mobility training, Home safety training and Stair training  Patient / Family readiness to learn indicated by: asking questions, trying to perform skills and interest  Persons(s) to be included in education: patient (P)  Barriers to Learning/Limitations: None  Patient Goal (s): strengthen legs  Patient Self Reported Health Status: fair  Rehabilitation Potential: good    Short Term Goals: To be accomplished in 6 weeks:  Pt will be independent in HEP in order to maintain gains made throughout PT episode. Pt will ambulate with appropriate % of the time in order to increase safety and decrease fall risk. Pt will demonstrate full B ankle, hip ROM without symptoms in prep for functional activities. Long Term Goals: To be accomplished in 12 weeks:  Patient will report pain with activity < 3/10 in order to allow for ADL's. Patient will demonstrate ability to ambulate 500 ft. with appropriate AD and no increase in symptoms  in order to allow for community ambulation. Patient will report ability to perform sit to stand transfer without difficulty in order to allow for increased functional mobility. Frequency / Duration: Patient to be seen 1-2 times per week for 24 treatments. Patient/ Caregiver education and instruction: exercises    [x]  Plan of care has been reviewed with PTA        Certification Period: 90 days  [place] , PT, DPT, OCS, CMTPT 2/2/2021     ________________________________________________________________________    I certify that the above Therapy Services are being furnished while the patient is under my care. I agree with the treatment plan and certify that this therapy is necessary.     [de-identified] Signature:____________________  Date:____________Time: _________

## 2021-02-11 ENCOUNTER — HOSPITAL ENCOUNTER (OUTPATIENT)
Dept: PHYSICAL THERAPY | Age: 41
Discharge: HOME | End: 2021-02-11
Payer: MEDICARE

## 2021-02-11 PROCEDURE — 97110 THERAPEUTIC EXERCISES: CPT

## 2021-02-11 PROCEDURE — 97112 NEUROMUSCULAR REEDUCATION: CPT

## 2021-02-11 PROCEDURE — 97140 MANUAL THERAPY 1/> REGIONS: CPT

## 2021-02-11 NOTE — PROGRESS NOTES
PT DAILY TREATMENT NOTE - [facility] 2-15    Patient Name: Osito Mcelroy  Date:2021  : 1980  [x]  Patient  Verified  Payor: Cordell Reynolds / Plan: [facility] MEDICARE CHOICE PPO/PFFS / Product Type: Managed Care Medicare /    In time:4:00 pm  Out time:5:05 pm  Total Treatment Time (min): 65  Total Timed Codes (min): 55  1:1 Treatment Time ( [address] only): 55   Visit #:  2    Treatment Area: Other abnormalities of gait and mobility [R26.89]  Muscle weakness (generalized) [M62.81]    SUBJECTIVE  Pain Level (0-10 scale): 0/10, 3/10 pain with trying to lift LLE.    Any medication changes, allergies to medications, adverse drug reactions, diagnosis change, or new procedure performed?: [x] No    [] Yes (see summary sheet for update)  Subjective functional status/changes:   [] No changes reported  Reports compliance with HEP without difficulty    OBJECTIVE    Modality rationale: decrease pain and increase tissue extensibility to improve the patients ability to perform ADL's including putting on shoes/socks, transferring sit to stand and  walking     Min Type Additional Details       [] Estim: []Att   []Unatt    []TENS instruct                  []IFC  []Premod   []NMES                     []Other:  []w/US   []w/ice   []w/heat  Position:  Location:       []  Traction: [] Cervical       []Lumbar                       [] Prone          []Supine                       []Intermittent   []Continuous Lbs:  [] before manual  [] after manual  []w/heat    []  Ultrasound: []Continuous   [] Pulsed                       at: []1MHz   []3MHz Location:  W/cm2:    [] Paraffin         Location:   []w/heat   10 []  Ice     [x]  Heat  []  Ice massage Position: S/L  Location: L hip    []  Laser  []  Other: Position:  Location:      []  Vasopneumatic Device Pressure:       [] lo [] med [] hi   Temperature:      [x] Skin assessment post-treatment:  [x]intact []redness- no adverse reaction    []redness  adverse reaction:     15 min Therapeutic Exercise:  - See flow sheet :   Rationale: increase ROM and increase strength to improve the patients ability to perform ADL's including putting on shoes/socks, transferring sit to stand and  walking    10 min Neuromuscular Re-education:  [x]  See flow sheet :   Rationale: improve coordination  to improve the patients ability to perform ADL's including putting on shoes/socks, transferring sit to stand and  walking    30 min Manual Therapy: STM/MFR L ITB, TFL, glutes, L L/S paraspinals    Rationale: decrease pain, increase ROM, increase tissue extensibility and decrease trigger points to improve the patients ability to perform ADL's including putting on shoes/socks, transferring sit to stand and  walking            With   [x] TE   [] TA   [x] Neuro   [] SC   [] other: Patient Education: [x] Review HEP    [] Progressed/Changed HEP based on:   [x] positioning   [x] body mechanics   [] transfers   [x] heat/ice application    [] other:      Other Objective/Functional Measures: Increased tension/ttp:L ITB, TFL, glutes, L L/S paraspinals      Pain Level (0-10 scale) post treatment: 0/10    ASSESSMENT/Changes in Function:   Tolerrated treatment session fairly well, only c/o L lateral hip/thigh p! With seated piriformis stretch. Mild improvement in symptoms after manual treatment. Patient will continue to benefit from skilled PT services to modify and progress therapeutic interventions, address functional mobility deficits, address ROM deficits, address strength deficits, analyze and address soft tissue restrictions, analyze and cue movement patterns, analyze and modify body mechanics/ergonomics, assess and modify postural abnormalities, address imbalance/dizziness and instruct in home and community integration to attain remaining goals.      []  See Plan of Care  []  See progress note/recertification  []  See Discharge Summary         Progress towards goals / Updated goals:  Patient demonstrates knowledge of HEP.    PLAN  [x]  Upgrade activities as tolerated     [x]  Continue plan of care  [x]  Update interventions per flow sheet       []  Discharge due to:_  []  Other:_      Hayde Charlton , PT, DPT, OCS, CMTPT 2/11/2021

## 2021-02-18 ENCOUNTER — APPOINTMENT (OUTPATIENT)
Dept: PHYSICAL THERAPY | Age: 41
End: 2021-02-18
Payer: MEDICARE

## 2021-02-19 DIAGNOSIS — G35 MS (MULTIPLE SCLEROSIS) (HCC): ICD-10-CM

## 2021-02-19 RX ORDER — DIMETHYL FUMARATE 240 MG/1
CAPSULE ORAL
Qty: 60 CAP | Refills: 0 | Status: SHIPPED | OUTPATIENT
Start: 2021-02-19 | End: 2021-04-15

## 2021-02-22 ENCOUNTER — HOSPITAL ENCOUNTER (OUTPATIENT)
Dept: PHYSICAL THERAPY | Age: 41
Discharge: HOME | End: 2021-02-22
Payer: MEDICARE

## 2021-02-22 PROCEDURE — 97110 THERAPEUTIC EXERCISES: CPT

## 2021-02-22 PROCEDURE — 97112 NEUROMUSCULAR REEDUCATION: CPT

## 2021-02-22 NOTE — PROGRESS NOTES
PT DAILY TREATMENT NOTE - [facility] 2-15    Patient Name: Rakan Guo  Date:2021  : 1980  [x]  Patient  Verified  Payor: Leslye Born / Plan: [facility] HUMAN MEDICARE CHOICE PPO/PFFS / Product Type: Managed Care Medicare /    In time:3:15 pm  Out time:4:10 pm  Total Treatment Time (min): 55  Total Timed Codes (min):45  1:1 Treatment Time ( [address] only): 45   Visit #:  3    Treatment Area: Other abnormalities of gait and mobility [R26.89]  Muscle weakness (generalized) [M62.81]    SUBJECTIVE  Pain Level (0-10 scale): 2/10   Any medication changes, allergies to medications, adverse drug reactions, diagnosis change, or new procedure performed?: [x] No    [] Yes (see summary sheet for update)  Subjective functional status/changes:   [] No changes reported  Reports exercises are going well. This will be her last visit here as she is transferring care to [address] [place] [address].     OBJECTIVE    Modality rationale: decrease pain and increase tissue extensibility to improve the patients ability to perform ADL's including putting on shoes/socks, transferring sit to stand and  walking     Min Type Additional Details       [] Estim: []Att   []Unatt    []TENS instruct                  []IFC  []Premod   []NMES                     []Other:  []w/US   []w/ice   []w/heat  Position:  Location:       []  Traction: [] Cervical       []Lumbar                       [] Prone          []Supine                       []Intermittent   []Continuous Lbs:  [] before manual  [] after manual  []w/heat    []  Ultrasound: []Continuous   [] Pulsed                       at: []1MHz   []3MHz Location:  W/cm2:    [] Paraffin         Location:   []w/heat   10 []  Ice     [x]  Heat  []  Ice massage Position: S/L  Location: L hip    []  Laser  []  Other: Position:  Location:      []  Vasopneumatic Device Pressure:       [] lo [] med [] hi   Temperature:      [x] Skin assessment post-treatment:  [x]intact []redness- no adverse reaction    []redness  adverse reaction:     25 min Therapeutic Exercise:  - See flow sheet :   Rationale: increase ROM and increase strength to improve the patients ability to perform ADL's including putting on shoes/socks, transferring sit to stand and  walking    10 min Neuromuscular Re-education:  [x]  See flow sheet :   Rationale: improve coordination  to improve the patients ability to perform ADL's including putting on shoes/socks, transferring sit to stand and  walking    10 min Manual Therapy: STM/MFR L ITB, TFL, glute med    Rationale: decrease pain, increase ROM, increase tissue extensibility and decrease trigger points to improve the patients ability to perform ADL's including putting on shoes/socks, transferring sit to stand and  walking            With   [x] TE   [] TA   [x] Neuro   [] SC   [] other: Patient Education: [x] Review HEP    [] Progressed/Changed HEP based on:   [x] positioning   [x] body mechanics   [] transfers   [x] heat/ice application    [] other:      Other Objective/Functional Measures: Increased tension/ttp:L ITB, TFL, glute med   L hip PROM ER: p! Pain Level (0-10 scale) post treatment: 1/10    ASSESSMENT/Changes in Function:   Patient will continue to benefit from skilled PT services to modify and progress therapeutic interventions, address functional mobility deficits, address ROM deficits, address strength deficits, analyze and address soft tissue restrictions, analyze and cue movement patterns, analyze and modify body mechanics/ergonomics, assess and modify postural abnormalities, address imbalance/dizziness and instruct in home and community integration to attain remaining goals. []  See Plan of Care  []  See progress note/recertification  [x]  See Discharge Summary         Progress towards goals / Updated goals:  Short Term Goals: To be accomplished in 6 weeks:  Pt will be independent in HEP in order to maintain gains made throughout PT episode.  MET  Pt will ambulate with appropriate AD 100% of the time in order to increase safety and decrease fall risk. MET   Pt will demonstrate full B ankle, hip ROM without symptoms in prep for functional activities. Not met     Long Term Goals: To be accomplished in 12 weeks:  Patient will report pain with activity < 3/10 in order to allow for ADL's. Partially met  Patient will demonstrate ability to ambulate 500 ft. with appropriate AD and no increase in symptoms  in order to allow for community ambulation. Not assessed  Patient will report ability to perform sit to stand transfer without difficulty in order to allow for increased functional mobility.  Not assessed    PLAN  []  Upgrade activities as tolerated     []  Continue plan of care  []  Update interventions per flow sheet       [x]  Discharge due to:_ pt transferring care to different facility  []  Other:_      Tiffanie Plant , PT, DPT, OCS, CMTPT 2/22/2021

## 2021-02-23 NOTE — PROGRESS NOTES
Physical Therapy at [facility],   a part of  [facility]  [place]  [facility], [address]  Phone: [phone] Fax: [phone]      Discharge Summary 2-15    Patient name: Rei Garza  : 1980  Provider#: [id number]  Referral source: Sary Barbosa MD      Medical/Treatment Diagnosis: Other abnormalities of gait and mobility [R26.89]  Muscle weakness (generalized) [M62.81]     Prior Hospitalization: see medical history     Comorbidities: See Plan of Care  Prior Level of Function: See Plan of Care  Medications: Verified on Patient Summary List    Start of Care: 21      Onset Date:2020   Visits from Start of Care: 3     Missed Visits: 0  Reporting Period : 21 to 21    Assessment/Summary of care: Patient evaluated and treated for B LE weakness and L lateral thigh pain. Treatment included manual treatment for soft tissue mobilization, therapeutic exercise for LE flexibility/strength, neuromuscular reeducation for core stability, and modalities including moist heat for pain relief. Patient being discharged as she is transferring care to another facility. Short Term Goals: To be accomplished in 6 weeks:  Pt will be independent in HEP in order to maintain gains made throughout PT episode. MET  Pt will ambulate with appropriate % of the time in order to increase safety and decrease fall risk. MET   Pt will demonstrate full B ankle, hip ROM without symptoms in prep for functional activities. Not met     Long Term Goals: To be accomplished in 12 weeks:  Patient will report pain with activity < 3/10 in order to allow for ADL's. Partially met  Patient will demonstrate ability to ambulate 500 ft. with appropriate AD and no increase in symptoms  in order to allow for community ambulation.  Not assessed  Patient will report ability to perform sit to stand transfer without difficulty in order to allow for increased functional mobility. Not assessed      RECOMMENDATIONS:  [x]Discontinue therapy: []Patient has reached or is progressing toward set goals     []Patient is non-compliant or has abdicated     []Due to lack of appreciable progress towards set goals     [x]Other - transferring care to different facility    Kathy Wood , PT, DPT, OCS, CMTPT 2/23/2021

## 2021-04-15 DIAGNOSIS — G35 MS (MULTIPLE SCLEROSIS) (HCC): ICD-10-CM

## 2021-04-15 RX ORDER — DIMETHYL FUMARATE 240 MG/1
CAPSULE ORAL
Qty: 60 CAP | Refills: 0 | Status: SHIPPED | OUTPATIENT
Start: 2021-04-15 | End: 2021-06-28

## 2021-04-16 ENCOUNTER — VIRTUAL VISIT (OUTPATIENT)
Dept: FAMILY MEDICINE CLINIC | Age: 41
End: 2021-04-16
Payer: MEDICARE

## 2021-04-16 DIAGNOSIS — M23.91 LOCKING OF BOTH KNEES: Primary | ICD-10-CM

## 2021-04-16 DIAGNOSIS — E66.9 OBESITY, CLASS II, BMI 35-39.9: ICD-10-CM

## 2021-04-16 DIAGNOSIS — G35 MS (MULTIPLE SCLEROSIS) (HCC): ICD-10-CM

## 2021-04-16 DIAGNOSIS — M23.92 LOCKING OF BOTH KNEES: Primary | ICD-10-CM

## 2021-04-16 PROCEDURE — 99213 OFFICE O/P EST LOW 20 MIN: CPT | Performed by: FAMILY MEDICINE

## 2021-04-16 PROCEDURE — G9717 DOC PT DX DEP/BP F/U NT REQ: HCPCS | Performed by: FAMILY MEDICINE

## 2021-04-16 PROCEDURE — G8427 DOCREV CUR MEDS BY ELIG CLIN: HCPCS | Performed by: FAMILY MEDICINE

## 2021-04-16 PROCEDURE — G8417 CALC BMI ABV UP PARAM F/U: HCPCS | Performed by: FAMILY MEDICINE

## 2021-04-16 NOTE — PROGRESS NOTES
Chief Complaint   Patient presents with    Knee Pain     both knee lock when pt is walking x 24 hours

## 2021-04-16 NOTE — PROGRESS NOTES
Nazia Ivey is a 45 y.o. female who was seen by synchronous (real-time) audio-video technology on 4/16/2021. Assessment & Plan:   Diagnoses and all orders for this visit:    1. Locking of both knees  -     REFERRAL TO ORTHOPEDICS    2. MS (multiple sclerosis) (HCC)  -     REFERRAL TO ORTHOPEDICS    3. Obesity, Class II, BMI 35-39.9        Painless knee locking of uncertain etiology, neurology does not think it is MS related  Losing weight  Orthopedics referral    Follow-up and Dispositions    · Return if symptoms worsen or fail to improve. Reviewed plan of care. Patient has provided input and agrees with goals. CPT Codes 89674-76997 for Established Patients may apply to this Telehealth Visit      Subjective:   Nazia Ivey was seen for Knee Pain (both knee lock when pt is walking x 24 hours )      Patient presents with:  Knee Pain: both knee lock when pt is walking x 24 hours     Her left knee started locking a month ago, but yesterday, both knees locked. She has never had knee pain. Evidently, she thought it was related to her MS, but she contacted her neurologist's office and they referred her to me. It is not like spasm. Nothing seems to cause it. Uses a rollator for her doctor appointments, but she has not increased her walking lately. Review of Systems   Constitutional: Positive for weight loss. Musculoskeletal:        No knee swelling   Skin:        No knee redness or warmth         Objective:     Physical Exam  Constitutional:       General: She is not in acute distress. Appearance: Normal appearance. Neurological:      Mental Status: She is alert and oriented to person, place, and time. Due to this being a TeleHealth evaluation, many elements of the physical examination are unable to be assessed. We discussed the expected course, resolution and complications of the diagnosis(es) in detail.   Medication risks, benefits, costs, interactions, and alternatives were discussed as indicated. I advised her to contact the office if her condition worsens, changes or fails to improve as anticipated. She expressed understanding with the diagnosis(es) and plan. Pursuant to the emergency declaration under the [facility], [facility] waiver authority and the [employer] Act, this Virtual  Visit was conducted, with patient's consent, to reduce the patient's risk of exposure to COVID-19 and provide continuity of care for an established patient. Services were provided through a video synchronous discussion virtually to substitute for in-person clinic visit.     Ezequiel Vera MD

## 2021-06-08 ENCOUNTER — VIRTUAL VISIT (OUTPATIENT)
Dept: FAMILY MEDICINE CLINIC | Age: 41
End: 2021-06-08
Payer: MEDICARE

## 2021-06-08 DIAGNOSIS — J06.9 VIRAL UPPER RESPIRATORY TRACT INFECTION: Primary | ICD-10-CM

## 2021-06-08 PROCEDURE — 99442 PR PHYS/QHP TELEPHONE EVALUATION 11-20 MIN: CPT | Performed by: FAMILY MEDICINE

## 2021-06-08 RX ORDER — GUAIFENESIN AND PSEUDOEPHEDRINE HCL 1200; 120 MG/1; MG/1
1 TABLET, EXTENDED RELEASE ORAL 2 TIMES DAILY
COMMUNITY
End: 2021-09-07

## 2021-06-08 NOTE — PROGRESS NOTES
Stef Rod is a [age] [facility] y.o. female evaluated via telephone on 6/8/2021. Consent:  She and/or health care decision maker is aware that she may receive a bill for this telephone service, depending on her insurance coverage, and has provided verbal consent to proceed: Yes      Documentation:  I communicated with the patient and/or health care decision maker about her respiratory symptoms. Details of this discussion including any medical advice provided:       Subjective:   Stef Rod was seen for Cold Symptoms (cough congestion lightheaded and HA)      Patient presents with:  Cold Symptoms: cough congestion lightheaded and HA    Her symptoms started about a week ago and she is getting better. She has not tried taking anything. Review of Systems   Constitutional: Negative for chills, fever and malaise/fatigue. HENT: Positive for congestion. Negative for ear pain and sore throat. Mucous is white to clear in color. There is sinus pain. Respiratory: Positive for cough. Negative for sputum production, shortness of breath and wheezing. Neurological: Positive for headaches. Objective:     BP Readings from Last 3 Encounters:   10/06/20 118/78   09/24/20 121/73   07/09/20 100/74           Assessment & Plan:   Diagnoses and all orders for this visit:    1. Viral upper respiratory tract infection        Rest, push fluids  Mucinex D prn    Follow-up and Dispositions    · Return if not better in one week. Reviewed plan of care. Patient has provided input and agrees with goals. I affirm this is a Patient Initiated Episode with an Established Patient who has not had a related appointment within my department in the past 7 days or scheduled within the next 24 hours.     Total Time: minutes: 11-20 minutes    Note: not billable if this call serves to triage the patient into an appointment for the relevant concern      Meagan Hayes MD

## 2021-06-28 DIAGNOSIS — G35 MS (MULTIPLE SCLEROSIS) (HCC): ICD-10-CM

## 2021-06-28 RX ORDER — DIMETHYL FUMARATE 240 MG/1
CAPSULE ORAL
Qty: 60 CAPSULE | Refills: 0 | Status: SHIPPED | OUTPATIENT
Start: 2021-06-28 | End: 2021-08-27

## 2021-07-19 DIAGNOSIS — D50.0 IRON DEFICIENCY ANEMIA DUE TO CHRONIC BLOOD LOSS: ICD-10-CM

## 2021-08-27 DIAGNOSIS — G35 MS (MULTIPLE SCLEROSIS) (HCC): ICD-10-CM

## 2021-08-27 RX ORDER — DIMETHYL FUMARATE 240 MG/1
CAPSULE ORAL
Qty: 60 CAPSULE | Refills: 0 | Status: SHIPPED | OUTPATIENT
Start: 2021-08-27 | End: 2021-10-26

## 2021-09-07 ENCOUNTER — VIRTUAL VISIT (OUTPATIENT)
Dept: FAMILY MEDICINE CLINIC | Age: 41
End: 2021-09-07
Payer: MEDICARE

## 2021-09-07 ENCOUNTER — TELEPHONE (OUTPATIENT)
Dept: FAMILY MEDICINE CLINIC | Age: 41
End: 2021-09-07

## 2021-09-07 DIAGNOSIS — G35 MS (MULTIPLE SCLEROSIS) (HCC): Primary | ICD-10-CM

## 2021-09-07 DIAGNOSIS — R29.898 WEAKNESS OF BOTH LEGS: ICD-10-CM

## 2021-09-07 DIAGNOSIS — F33.2 MODERATELY SEVERE RECURRENT MAJOR DEPRESSION (HCC): ICD-10-CM

## 2021-09-07 PROCEDURE — G8427 DOCREV CUR MEDS BY ELIG CLIN: HCPCS | Performed by: FAMILY MEDICINE

## 2021-09-07 PROCEDURE — G9717 DOC PT DX DEP/BP F/U NT REQ: HCPCS | Performed by: FAMILY MEDICINE

## 2021-09-07 PROCEDURE — G8417 CALC BMI ABV UP PARAM F/U: HCPCS | Performed by: FAMILY MEDICINE

## 2021-09-07 PROCEDURE — 99214 OFFICE O/P EST MOD 30 MIN: CPT | Performed by: FAMILY MEDICINE

## 2021-09-07 NOTE — TELEPHONE ENCOUNTER
DME order and office notes placed in chair. Is there a fax number or name of DME company to send orders?

## 2021-09-07 NOTE — TELEPHONE ENCOUNTER
Please have me sign the DME for leg AFO's I printed and FAX it, along with her office note, to [employer]. Please leave it on my chair for tomorrow. Thanks!

## 2021-09-07 NOTE — PROGRESS NOTES
Chief Complaint   Patient presents with    Follow-up     Needs an Rx for leg braces. 1. Have you been to the ER, urgent care clinic since your last visit? Hospitalized since your last visit? No    2. Have you seen or consulted any other health care providers outside of the [address] [place] [address] since your last visit? Include any pap smears or colon screening.  No

## 2021-09-07 NOTE — TELEPHONE ENCOUNTER
Called pt, and left a voice message, asking that she call the office back in regards to her DME order. Patient will need to provided fax number and or name of company, so we have documentation that records are going to correct location.

## 2021-09-07 NOTE — TELEPHONE ENCOUNTER
Pt called office back and provided office and fax number for DME company.    [phone]

## 2021-09-07 NOTE — PROGRESS NOTES
Issa Rodriguez is a 45 y.o. female who was seen by synchronous (real-time) audio-video technology on 9/7/2021. Assessment & Plan:   Diagnoses and all orders for this visit:    1. MS (multiple sclerosis) ([facility] [address]  -     AMB SUPPLY ORDER    2. Weakness of both legs  -     AMB SUPPLY ORDER    3. Moderately severe recurrent major depression ([facility] [address]        Needing bilateral leg AFO's  Depression doing well  AFO prescription written  Continue to follow up with Psychiatry      Follow-up and Dispositions    · Return if symptoms worsen or fail to improve. Reviewed plan of care. Patient has provided input and agrees with goals. CPT Codes [id number] for Established Patients may apply to this Telehealth Visit      Subjective:   Issa Rodriguez was seen for Follow-up (Needs an Rx for leg braces.)      Patient presents with: Follow-up: Needs an Rx for leg braces. She has MS and is followed by neurology. Her last visit was in the fall and her LE strength was 4/5. She has been in PT and her knees lock when she walks and they have recommended that she have the braces. Her next appointment with them is next week. Evidently, she had them five years ago and needs a new set. Her neurologist retired and does not have an appointment with her new one until December. Also, she needs follow up on her depression which is doing well. She is seeing Psychiatry and a therapist.        Review of Systems   Constitutional: Negative for malaise/fatigue. Neurological: Positive for weakness. Bilateral leg weakness         Objective:     Physical Exam  Constitutional:       General: She is not in acute distress. Appearance: Normal appearance. Neurological:      Mental Status: She is alert and oriented to person, place, and time. Due to this being a TeleHealth evaluation, many elements of the physical examination are unable to be assessed.          We discussed the expected course, resolution and complications of the diagnosis(es) in detail. Medication risks, benefits, costs, interactions, and alternatives were discussed as indicated. I advised her to contact the office if her condition worsens, changes or fails to improve as anticipated. She expressed understanding with the diagnosis(es) and plan. Pursuant to the emergency declaration under the [address] [place] [address] [facility] authority and the [employer] and [employer] Act, this Virtual  Visit was conducted, with patient's consent, to reduce the patient's risk of exposure to COVID-19 and provide continuity of care for an established patient. Services were provided through a video synchronous discussion virtually to substitute for in-person clinic visit.     Kimberly Ramirez MD

## 2021-10-04 DIAGNOSIS — G35 MS (MULTIPLE SCLEROSIS) (HCC): Primary | ICD-10-CM

## 2021-11-30 NOTE — PROGRESS NOTES
Amy West is a [de-identified] P5,  37 y.o. female BLACK/ whose LMP was on 11/14/2021 who presents for her annual checkup. She is having no significant problems. MS is really much worse now    Menstrual status:    Her periods are moderate in flow. She is using one to two pads or tampons per day, usually regular and last 26-30 days. She denies dysmenorrhea. She reports no premenstrual symptoms. The patient is not using HRT. Contraception:    The current method of family planning is none and abstinence. Sexual history:    She  reports previously being sexually active and has had partner(s) who are male. Medical conditions:    Since her last annual GYN exam about one year ago, she has had the following changes in her health history: none. Pap and Mammogram History:    Her most recent Pap smear was 6/5/2018 Normal HPV neg    The patient is scheduled for a mammogram in office on 1/25/2022    Breast Cancer History/Substance Abuse:    She has no and a family history of breast cancer. Osteoporosis History:    Family history does not include a first or second degree relative with osteopenia or osteoporosis. Past Medical History:   Diagnosis Date    Abnormal pap 3/2015; 4/2016; 5/16/17 2015 LGSIL +HPV; 2013 neg pap +HPV;4/2016 neg pap +HPV; 5/16/17 Neg pap +HPV    Anemia     Depression     Eczema     H/O colposcopy with cervical biopsy 3/15 + 10/2013    neg    History of seizure 4/15/2016    One in 2008    Hypercholesterolemia 5/22/2018    Morbid obesity ([address]     Multiple sclerosis ([address]      Past Surgical History:   Procedure Laterality Date    Wynelle Dakins CHOLECYSTECTOMY  2017    Robotic-assisted laparoscopic cholecystectomy with firefly.  HX COLPOSCOPY  6/2016; 6/8/17    neg    HX GYN  unsure    LEEP    HX LEEP PROCEDURE  6/08    neg    HX ORTHOPAEDIC Left 2012    Left bunionectomy.  HX ORTHOPAEDIC Right     Right bunionectomy.      Current Outpatient Medications   Medication Sig Dispense Refill    Tecfidera 240 mg cpDR TAKE ONE CAPSULE BY MOUTH TWICE DAILY. STORE IN ORIGINAL CONTAINER AT ROOM TEMPERATURE. 60 Capsule 3    benztropine (COGENTIN) 0.5 mg tablet TAKE 1 TABLET BY MOUTH TWICE DAILY 180 Tab 3    betamethasone valerate (LUXIQ) 0.12 % topical foam       ARIPiprazole (ABILIFY) 10 mg tablet TK 1 T PO Daily 30 Tab 5    vortioxetine (TRINTELLIX) 20 mg tablet Take 1 Tab by mouth daily. 30 Tab 5    senna-docusate (PERICOLACE) 8.6-50 mg per tablet Take 1 Tab by mouth two (2) times daily as needed for Constipation. 60 Tab 3    ferrous sulfate 325 mg (65 mg iron) tablet Take 1 Tab by mouth Daily (before breakfast). Allergies: Amoxicillin and Penicillins   Social History     Socioeconomic History    Marital status: SINGLE     Spouse name: Not on file    Number of children: Not on file    Years of education: Not on file    Highest education level: Not on file   Occupational History    Not on file   Tobacco Use    Smoking status: Never Smoker    Smokeless tobacco: Never Used   Vaping Use    Vaping Use: Never used   Substance and Sexual Activity    Alcohol use: Yes     Comment: occasionally    Drug use: No    Sexual activity: Not Currently     Partners: Male   Other Topics Concern    Not on file   Social History Narrative    Not on file     Social Determinants of Health     Financial Resource Strain:     Difficulty of Paying Living Expenses: Not on file   Food Insecurity:     Worried About Running Out of Food in the Last Year: Not on file    Dianne of Food in the Last Year: Not on file   Transportation Needs:     Lack of Transportation (Medical): Not on file    Lack of Transportation (Non-Medical):  Not on file   Physical Activity:     Days of Exercise per Week: Not on file    Minutes of Exercise per Session: Not on file   Stress:     Feeling of Stress : Not on file   Social Connections:     Frequency of Communication with Friends and Family: Not on file    Frequency of Social Gatherings with Friends and Family: Not on file    Attends Voodoo Services: Not on file    Active Member of Clubs or Organizations: Not on file    Attends Club or Organization Meetings: Not on file    Marital Status: Not on file   Intimate Partner Violence:     Fear of Current or Ex-Partner: Not on file    Emotionally Abused: Not on file    Physically Abused: Not on file    Sexually Abused: Not on file   Housing Stability:     Unable to Pay for Housing in the Last Year: Not on file    Number of [place] in the Last Year: Not on file    Unstable Housing in the Last Year: Not on file     Tobacco History:  reports that she has never smoked. She has never used smokeless tobacco.  Alcohol Abuse:  reports current alcohol use. Drug Abuse:  reports no history of drug use. Patient Active Problem List   Diagnosis Code    Depression, major, single episode, moderate ([facility] [address] [address] F32.1    History of seizure Z87.898    MS (multiple sclerosis) ([facility] G35    History of cervical dysplasia Z87.410    Intrinsic eczema L20.84    Fatigue due to depression F32. A, R53.83    Obesity, Class II, BMI 35-39.9 E66.9    S/P laparoscopic cholecystectomy Z90.49    Hypercholesterolemia E78.00    Obesity, morbid (HCC) E66.01         Review of Systems - History obtained from the patient  Constitutional: negative for weight loss, fever, night sweats  HEENT: negative for hearing loss, earache, congestion, snoring, sorethroat  CV: negative for chest pain, palpitations, edema  Resp: negative for cough, shortness of breath, wheezing  GI: negative for change in bowel habits, abdominal pain, black or bloody stools  : negative for frequency, dysuria, hematuria, vaginal discharge  MSK: negative for back pain, joint pain, muscle pain  Breast: negative for breast lumps, nipple discharge, galactorrhea  Skin :negative for itching, rash, hives  Neuro: negative for dizziness, headache, confusion, weakness  Psych: negative for anxiety, depression, change in mood  Heme/lymph: negative for bleeding, bruising, pallor    Physical Exam    Visit Vitals  /80   Wt 259 lb (117.5 kg)   LMP 11/14/2021   BMI 40.57 kg/m²     Constitutional  · Appearance: well-nourished, well developed, alert, in no acute distress    HENT  · Head and Face: appears normal    Neck  · Inspection/Palpation: normal appearance, no masses or tenderness  · Lymph Nodes: no lymphadenopathy present  · Thyroid: gland size normal, nontender, no nodules or masses present on palpation    Chest  · Respiratory Effort: breathing normal  · Auscultation: normal breath sounds    Cardiovascular  · Heart:  · Auscultation: regular rate and rhythm without murmur    Breasts  · Inspection of Breasts: breasts symmetrical, no skin changes, no discharge present, nipple appearance normal, no skin retraction present  · Palpation of Breasts and Axillae: no masses present on palpation, no breast tenderness  · Axillary Lymph Nodes: no lymphadenopathy present    Gastrointestinal  · Abdominal Examination: abdomen non-tender to palpation, normal bowel sounds, no masses present  · Liver and spleen: no hepatomegaly present, spleen not palpable  · Hernias: no hernias identified    Skin  · General Inspection: no rash, no lesions identified    Neurologic/Psychiatric  · Mental Status:  · Orientation: grossly oriented to person, place and time  · Mood and Affect: mood normal, affect appropriate    Genitourinary  · External Genitalia: normal appearance for age, no discharge present, no tenderness present, no inflammatory lesions present, no masses present, no atrophy present  · Vagina: normal vaginal vault without central or paravaginal defects, no discharge present, no inflammatory lesions present, no masses present  · Bladder: non-tender to palpation  · Urethra: appears normal  · Cervix: normal   · Uterus: ?fundal fibroid  · Adnexa: no adnexal tenderness present, no adnexal masses present  · Perineum: perineum within normal limits, no evidence of trauma, no rashes or skin lesions present  · Anus: anus within normal limits, no hemorrhoids present  · Inguinal Lymph Nodes: no lymphadenopathy present    Assessment:  Routine gynecologic examination  Her current medical status is satisfactory with no evidence of significant gynecologic issues.     Plan:  Counseled re: diet, exercise, healthy lifestyle  Return for yearly wellness visits  Rec annual mammogram  Pap/HPV  US

## 2021-12-02 ENCOUNTER — OFFICE VISIT (OUTPATIENT)
Dept: OBGYN CLINIC | Age: 41
End: 2021-12-02
Payer: MEDICARE

## 2021-12-02 VITALS — BODY MASS INDEX: 40.57 KG/M2 | SYSTOLIC BLOOD PRESSURE: 130 MMHG | DIASTOLIC BLOOD PRESSURE: 80 MMHG | WEIGHT: 259 LBS

## 2021-12-02 DIAGNOSIS — Z01.419 ENCNTR FOR GYN EXAM (GENERAL) (ROUTINE) W/O ABN FINDINGS: ICD-10-CM

## 2021-12-02 DIAGNOSIS — Z01.419 ENCOUNTER FOR GYNECOLOGICAL EXAMINATION (GENERAL) (ROUTINE) WITHOUT ABNORMAL FINDINGS: Primary | ICD-10-CM

## 2021-12-02 PROCEDURE — G8417 CALC BMI ABV UP PARAM F/U: HCPCS | Performed by: OBSTETRICS & GYNECOLOGY

## 2021-12-02 PROCEDURE — G9717 DOC PT DX DEP/BP F/U NT REQ: HCPCS | Performed by: OBSTETRICS & GYNECOLOGY

## 2021-12-02 PROCEDURE — 99396 PREV VISIT EST AGE 40-64: CPT | Performed by: OBSTETRICS & GYNECOLOGY

## 2021-12-03 ENCOUNTER — TELEPHONE (OUTPATIENT)
Dept: FAMILY MEDICINE CLINIC | Age: 41
End: 2021-12-03

## 2021-12-03 NOTE — TELEPHONE ENCOUNTER
Her father had visit with me and he is concerned about her obesity. He wants her enrolled in a program for weight loss. Please have her schedule an appointment with me about this.

## 2021-12-03 NOTE — TELEPHONE ENCOUNTER
Called pt, and left a voice message, advising I was calling to schedule her a follow up with  [facility]. Advised to call office back when able.

## 2021-12-07 LAB
CYTOLOGIST CVX/VAG CYTO: NORMAL
CYTOLOGY CVX/VAG DOC CYTO: NORMAL
CYTOLOGY CVX/VAG DOC THIN PREP: NORMAL
CYTOLOGY HISTORY:: NORMAL
DX ICD CODE: NORMAL
HPV I/H RISK 4 DNA CVX QL PROBE+SIG AMP: NEGATIVE
Lab: NORMAL
OTHER STN SPEC: NORMAL
STAT OF ADQ CVX/VAG CYTO-IMP: NORMAL

## 2022-01-09 RX ORDER — ESCITALOPRAM OXALATE 20 MG/1
20 TABLET ORAL DAILY
Qty: 30 TABLET | Refills: 12 | Status: SHIPPED | OUTPATIENT
Start: 2022-01-09 | End: 2022-02-24

## 2022-02-24 ENCOUNTER — OFFICE VISIT (OUTPATIENT)
Dept: OBGYN CLINIC | Age: 42
End: 2022-02-24

## 2022-02-24 VITALS — SYSTOLIC BLOOD PRESSURE: 135 MMHG | DIASTOLIC BLOOD PRESSURE: 96 MMHG

## 2022-02-24 DIAGNOSIS — D21.9 FIBROIDS: Primary | ICD-10-CM

## 2022-02-24 PROCEDURE — G9717 DOC PT DX DEP/BP F/U NT REQ: HCPCS | Performed by: OBSTETRICS & GYNECOLOGY

## 2022-02-24 PROCEDURE — 99212 OFFICE O/P EST SF 10 MIN: CPT | Performed by: OBSTETRICS & GYNECOLOGY

## 2022-02-24 PROCEDURE — G8427 DOCREV CUR MEDS BY ELIG CLIN: HCPCS | Performed by: OBSTETRICS & GYNECOLOGY

## 2022-02-24 PROCEDURE — G8417 CALC BMI ABV UP PARAM F/U: HCPCS | Performed by: OBSTETRICS & GYNECOLOGY

## 2022-03-18 PROBLEM — E66.01 OBESITY, MORBID (HCC): Status: ACTIVE | Noted: 2018-06-05

## 2022-03-19 PROBLEM — E66.812 OBESITY, CLASS II, BMI 35-39.9: Status: ACTIVE | Noted: 2017-03-27

## 2022-03-19 PROBLEM — Z90.49 S/P LAPAROSCOPIC CHOLECYSTECTOMY: Status: ACTIVE | Noted: 2017-04-18

## 2022-03-19 PROBLEM — E66.9 OBESITY, CLASS II, BMI 35-39.9: Status: ACTIVE | Noted: 2017-03-27

## 2022-03-19 PROBLEM — E78.00 HYPERCHOLESTEROLEMIA: Status: ACTIVE | Noted: 2018-05-22

## 2022-03-20 DIAGNOSIS — F41.9 ANXIETY: ICD-10-CM

## 2022-03-21 RX ORDER — BENZTROPINE MESYLATE 0.5 MG/1
TABLET ORAL
Qty: 180 TABLET | Refills: 0 | Status: SHIPPED | OUTPATIENT
Start: 2022-03-21 | End: 2022-06-21

## 2022-06-18 DIAGNOSIS — F41.9 ANXIETY: ICD-10-CM

## 2022-06-18 NOTE — LETTER
6/21/2022 1:03 PM    Ms. uYnior Crawford  [id number] [facility] [id number]      Dear Ms. Samuels Marybel Arredondoon missed you! Please call our office at [phone] and schedule a follow up appointment for your continued care. I will be unable to continue refilling your medications until you have been seen for an appointment. Look forward to seeing you soon.          Sincerely,      Sisi Diehl MD

## 2022-06-21 RX ORDER — BENZTROPINE MESYLATE 0.5 MG/1
TABLET ORAL
Qty: 180 TABLET | Refills: 0 | Status: SHIPPED | OUTPATIENT
Start: 2022-06-21 | End: 2022-10-04

## 2022-09-28 DIAGNOSIS — F41.9 ANXIETY: ICD-10-CM

## 2022-10-04 RX ORDER — BENZTROPINE MESYLATE 0.5 MG/1
TABLET ORAL
Qty: 180 TABLET | Refills: 0 | Status: SHIPPED | OUTPATIENT
Start: 2022-10-04

## 2022-10-28 ENCOUNTER — VIRTUAL VISIT (OUTPATIENT)
Dept: FAMILY MEDICINE CLINIC | Age: 42
End: 2022-10-28
Payer: MEDICARE

## 2022-10-28 DIAGNOSIS — R06.09 DOE (DYSPNEA ON EXERTION): Primary | ICD-10-CM

## 2022-10-28 PROCEDURE — 99213 OFFICE O/P EST LOW 20 MIN: CPT | Performed by: FAMILY MEDICINE

## 2022-10-28 PROCEDURE — G8417 CALC BMI ABV UP PARAM F/U: HCPCS | Performed by: FAMILY MEDICINE

## 2022-10-28 PROCEDURE — G9717 DOC PT DX DEP/BP F/U NT REQ: HCPCS | Performed by: FAMILY MEDICINE

## 2022-10-28 PROCEDURE — G8427 DOCREV CUR MEDS BY ELIG CLIN: HCPCS | Performed by: FAMILY MEDICINE

## 2022-10-28 NOTE — PROGRESS NOTES
Ashleigh Webb is a 40 y.o. female who was seen by synchronous (real-time) audio-video technology on 10/28/2022. Assessment & Plan:   Diagnoses and all orders for this visit:    1. SABA (dyspnea on exertion)  -     XR CHEST PA LAT; Future  -     EKG, 12 LEAD, INITIAL; Future  -     ECHO ADULT COMPLETE; Future      Uncertain etiology  Consider pulmonary function tests if above tests negative    Follow-up and Dispositions    Return pending test results. Reviewed plan of care. Patient has provided input and agrees with goals. CPT Codes 25101-48219 for Established Patients may apply to this Telehealth Visit      Subjective:   Ashleigh Webb was seen for Shortness of Breath (Off and on when walking short distances )      Patient presents with:  Shortness of Breath: Off and on when walking short distances     This started 2-3 months ago. No history of pulmonary disease or smoking. Review of Systems   Constitutional:         No weight gain   Respiratory:  Positive for shortness of breath. Negative for cough and wheezing. Cardiovascular:  Negative for chest pain and leg swelling. Objective:     Physical Exam  Constitutional:       General: She is not in acute distress. Appearance: Normal appearance. Pulmonary:      Effort: Pulmonary effort is normal.   Neurological:      Mental Status: She is alert and oriented to person, place, and time. Due to this being a TeleHealth evaluation, many elements of the physical examination are unable to be assessed. We discussed the expected course, resolution and complications of the diagnosis(es) in detail. Medication risks, benefits, costs, interactions, and alternatives were discussed as indicated. I advised her to contact the office if her condition worsens, changes or fails to improve as anticipated. She expressed understanding with the diagnosis(es) and plan.          Pursuant to the emergency declaration under the [facility] waiver authority and the [employer] Act, this Virtual  Visit was conducted, with patient's consent, to reduce the patient's risk of exposure to COVID-19 and provide continuity of care for an established patient. Services were provided through a video synchronous discussion virtually to substitute for in-person clinic visit.     Irina Washington MD

## 2022-10-28 NOTE — PROGRESS NOTES
Chief Complaint   Patient presents with    Shortness of Breath     Off and on when walking short distances      Pt was recently in [place] when symptoms started.

## 2022-11-08 ENCOUNTER — HOSPITAL ENCOUNTER (OUTPATIENT)
Dept: GENERAL RADIOLOGY | Age: 42
Discharge: HOME | End: 2022-11-08
Payer: MEDICARE

## 2022-11-08 DIAGNOSIS — R06.09 DOE (DYSPNEA ON EXERTION): ICD-10-CM

## 2022-11-08 PROCEDURE — 71046 X-RAY EXAM CHEST 2 VIEWS: CPT

## 2022-12-06 ENCOUNTER — HOSPITAL ENCOUNTER (OUTPATIENT)
Dept: NON INVASIVE DIAGNOSTICS | Age: 42
Discharge: HOME | End: 2022-12-06
Attending: FAMILY MEDICINE
Payer: MEDICARE

## 2022-12-06 VITALS
HEIGHT: 67 IN | BODY MASS INDEX: 40.66 KG/M2 | DIASTOLIC BLOOD PRESSURE: 85 MMHG | WEIGHT: 259.04 LBS | SYSTOLIC BLOOD PRESSURE: 139 MMHG

## 2022-12-06 DIAGNOSIS — R06.09 DOE (DYSPNEA ON EXERTION): ICD-10-CM

## 2022-12-06 LAB
ECHO AO ARCH DIAM: 1.9 CM
ECHO AO ASC DIAM: 3.2 CM
ECHO AO ASCENDING AORTA INDEX: 1.42 CM/M2
ECHO AV AREA PEAK VELOCITY: 1.7 CM2
ECHO AV AREA PEAK VELOCITY: 1.8 CM2
ECHO AV AREA VTI: 1.8 CM2
ECHO AV AREA/BSA VTI: 0.8 CM2/M2
ECHO AV MEAN GRADIENT: 5 MMHG
ECHO AV MEAN VELOCITY: 1.1 M/S
ECHO AV PEAK GRADIENT: 10 MMHG
ECHO AV PEAK GRADIENT: 11 MMHG
ECHO AV PEAK VELOCITY: 1.5 M/S
ECHO AV PEAK VELOCITY: 1.6 M/S
ECHO AV VTI: 27.5 CM
ECHO LA DIAMETER INDEX: 1.15 CM/M2
ECHO LA DIAMETER: 2.6 CM
ECHO LA VOL 2C: 28 ML (ref 22–52)
ECHO LA VOL 4C: 28 ML (ref 22–52)
ECHO LA VOL BP: 32 ML (ref 22–52)
ECHO LA VOL/BSA BIPLANE: 14 ML/M2 (ref 16–34)
ECHO LA VOLUME AREA LENGTH: 35 ML
ECHO LA VOLUME INDEX A2C: 12 ML/M2 (ref 16–34)
ECHO LA VOLUME INDEX A4C: 12 ML/M2 (ref 16–34)
ECHO LA VOLUME INDEX AREA LENGTH: 15 ML/M2 (ref 16–34)
ECHO LV E' LATERAL VELOCITY: 9 CM/S
ECHO LV E' SEPTAL VELOCITY: 6 CM/S
ECHO LV EDV A2C: 61 ML
ECHO LV EDV A4C: 47 ML
ECHO LV EDV BP: 53 ML (ref 56–104)
ECHO LV EDV INDEX A4C: 21 ML/M2
ECHO LV EDV INDEX BP: 23 ML/M2
ECHO LV EDV NDEX A2C: 27 ML/M2
ECHO LV EJECTION FRACTION A2C: 74 %
ECHO LV EJECTION FRACTION A4C: 68 %
ECHO LV EJECTION FRACTION BIPLANE: 69 % (ref 55–100)
ECHO LV ESV A2C: 16 ML
ECHO LV ESV A4C: 15 ML
ECHO LV ESV BP: 16 ML (ref 19–49)
ECHO LV ESV INDEX A2C: 7 ML/M2
ECHO LV ESV INDEX A4C: 7 ML/M2
ECHO LV ESV INDEX BP: 7 ML/M2
ECHO LV FRACTIONAL SHORTENING: 43 % (ref 28–44)
ECHO LV INTERNAL DIMENSION DIASTOLE INDEX: 2.04 CM/M2
ECHO LV INTERNAL DIMENSION DIASTOLIC: 4.6 CM (ref 3.9–5.3)
ECHO LV INTERNAL DIMENSION SYSTOLIC INDEX: 1.15 CM/M2
ECHO LV INTERNAL DIMENSION SYSTOLIC: 2.6 CM
ECHO LV IVSD: 1 CM (ref 0.6–0.9)
ECHO LV MASS 2D: 158.8 G (ref 67–162)
ECHO LV MASS INDEX 2D: 70.3 G/M2 (ref 43–95)
ECHO LV POSTERIOR WALL DIASTOLIC: 1 CM (ref 0.6–0.9)
ECHO LV RELATIVE WALL THICKNESS RATIO: 0.43
ECHO LVOT AREA: 2.8 CM2
ECHO LVOT AV VTI INDEX: 0.68
ECHO LVOT DIAM: 1.9 CM
ECHO LVOT MEAN GRADIENT: 2 MMHG
ECHO LVOT PEAK GRADIENT: 4 MMHG
ECHO LVOT PEAK VELOCITY: 1.1 M/S
ECHO LVOT STROKE VOLUME INDEX: 23.4 ML/M2
ECHO LVOT SV: 53 ML
ECHO LVOT VTI: 18.7 CM
ECHO MV A VELOCITY: 0.59 M/S
ECHO MV E DECELERATION TIME (DT): 170.6 MS
ECHO MV E VELOCITY: 0.55 M/S
ECHO MV E/A RATIO: 0.93
ECHO MV E/E' LATERAL: 6.11
ECHO MV E/E' RATIO (AVERAGED): 7.64
ECHO MV E/E' SEPTAL: 9.17
ECHO PULMONARY ARTERY END DIASTOLIC PRESSURE: 6 MMHG
ECHO PV MAX VELOCITY: 0.9 M/S
ECHO PV PEAK GRADIENT: 3 MMHG
ECHO PV REGURGITANT MAX VELOCITY: 1.2 M/S
ECHO RV FREE WALL PEAK S': 12 CM/S
ECHO RV INTERNAL DIMENSION: 3.5 CM
ECHO RV TAPSE: 1.7 CM (ref 1.7–?)
ECHO TV REGURGITANT MAX VELOCITY: 2.34 M/S
ECHO TV REGURGITANT PEAK GRADIENT: 22 MMHG

## 2022-12-06 PROCEDURE — 93306 TTE W/DOPPLER COMPLETE: CPT

## 2022-12-06 PROCEDURE — 93306 TTE W/DOPPLER COMPLETE: CPT | Performed by: INTERNAL MEDICINE

## 2023-01-19 ENCOUNTER — HOSPITAL ENCOUNTER (OUTPATIENT)
Dept: NON INVASIVE DIAGNOSTICS | Age: 43
Discharge: HOME | End: 2023-01-19
Payer: MEDICARE

## 2023-01-19 DIAGNOSIS — R06.09 DOE (DYSPNEA ON EXERTION): ICD-10-CM

## 2023-01-19 PROCEDURE — 93005 ELECTROCARDIOGRAM TRACING: CPT

## 2023-01-20 LAB
ATRIAL RATE: 79 BPM
CALCULATED P AXIS, ECG09: 48 DEGREES
CALCULATED R AXIS, ECG10: 55 DEGREES
CALCULATED T AXIS, ECG11: 33 DEGREES
DIAGNOSIS, 93000: NORMAL
P-R INTERVAL, ECG05: 156 MS
Q-T INTERVAL, ECG07: 368 MS
QRS DURATION, ECG06: 80 MS
QTC CALCULATION (BEZET), ECG08: 421 MS
VENTRICULAR RATE, ECG03: 79 BPM

## 2023-07-31 ENCOUNTER — TELEPHONE (OUTPATIENT)
Age: 43
End: 2023-07-31

## 2023-07-31 NOTE — TELEPHONE ENCOUNTER
PT call returned, left voice message    PT info relayed could discuss concerns and starting ocp at ae 08/17/23.

## 2023-07-31 NOTE — TELEPHONE ENCOUNTER
Patient name and  verified    43yo last ae 21, cxd appt 23      PT calling with complaints of light bleeding starting 23, no clots. PT states her menstrual cycle is not due until 23, she thinks she is intermittently bleeding in between periods and wants to know if she can get back on birth control pills. PT did not have an ae, so schedule one for 23 at 130p.     Please advise    Thank you

## 2023-08-14 ENCOUNTER — TRANSCRIBE ORDERS (OUTPATIENT)
Facility: HOSPITAL | Age: 43
End: 2023-08-14

## 2023-08-14 DIAGNOSIS — M54.50 LOW BACK PAIN, UNSPECIFIED BACK PAIN LATERALITY, UNSPECIFIED CHRONICITY, UNSPECIFIED WHETHER SCIATICA PRESENT: Primary | ICD-10-CM

## 2023-08-22 ENCOUNTER — HOSPITAL ENCOUNTER (OUTPATIENT)
Facility: HOSPITAL | Age: 43
Discharge: HOME | End: 2023-08-25
Payer: MEDICARE

## 2023-08-22 DIAGNOSIS — M54.50 LOW BACK PAIN, UNSPECIFIED BACK PAIN LATERALITY, UNSPECIFIED CHRONICITY, UNSPECIFIED WHETHER SCIATICA PRESENT: ICD-10-CM

## 2023-08-22 PROCEDURE — 72148 MRI LUMBAR SPINE W/O DYE: CPT

## 2023-09-06 NOTE — PROGRESS NOTES
Ramesh Harman is a 40 y.o. female returns for an annual exam     Chief Complaint   Patient presents with    Annual Exam       Patient's last menstrual period was 07/25/2023 (exact date). Her periods are moderate in flow and often irregular with no apparent pattern. She has dysmenorrhea. Problems: problems - irregular menses  Birth Control: abstinence. Last Pap: 12/2/2021; NILM/ HPV-  She does not have a history of THA 2, 3 or cervical cancer. Last Mammogram: Was in our office today      Examination chaperoned by Kain Cleveland LPN.

## 2023-09-14 ENCOUNTER — OFFICE VISIT (OUTPATIENT)
Age: 43
End: 2023-09-14
Payer: MEDICARE

## 2023-09-14 VITALS — WEIGHT: 277.4 LBS | BODY MASS INDEX: 43.45 KG/M2 | SYSTOLIC BLOOD PRESSURE: 119 MMHG | DIASTOLIC BLOOD PRESSURE: 87 MMHG

## 2023-09-14 DIAGNOSIS — Z01.419 ENCOUNTER FOR GYNECOLOGICAL EXAMINATION (GENERAL) (ROUTINE) WITHOUT ABNORMAL FINDINGS: Primary | ICD-10-CM

## 2023-09-14 DIAGNOSIS — D21.9 FIBROIDS: ICD-10-CM

## 2023-09-14 PROCEDURE — 99396 PREV VISIT EST AGE 40-64: CPT | Performed by: OBSTETRICS & GYNECOLOGY

## 2023-09-14 RX ORDER — SIPONIMOD 1 MG/1
TABLET, FILM COATED ORAL
COMMUNITY

## 2023-09-14 RX ORDER — TRAZODONE HYDROCHLORIDE 100 MG/1
100 TABLET ORAL NIGHTLY
COMMUNITY

## 2023-09-14 NOTE — PROGRESS NOTES
Idania Calvillo is a No obstetric history on file. ,  40 y.o. female Black / Koehlerpetra Schwartz whose LMP was on 7/25/2023 who presents for her annual checkup. She is having no problems. Now in a wheelchair most of the time due to [address] [facility] [place]. Had MRI due to back pain - was told fibroids bigger    Menstrual status:    Her periods are moderate in flow, irregular    She denies dysmenorrhea. Contraception:    The current method of family planning is abstinence. Sexual history:    She  reports that she is not currently sexually active. Pap and Mammogram History:    Birth Control: abstinence. Last Pap: 12/2/2021; NILM/ HPV-  She does not have a history of THA 2, 3 or cervical cancer. Last Mammogram: Was in our office today         Breast Cancer History    She has a family history of breast cancer. Family History   Problem Relation Age of Onset    Diabetes Mother     Hypertension Mother     Mult Sclerosis Mother     Cancer Mother         breast    Breast Cancer Maternal Grandmother     No Known Problems Sister     Bipolar Disorder Father     Breast Cancer Mother        Past Medical History:   Diagnosis Date    Abnormal pap 3/2015; 4/2016; 5/16/17 2015 LGSIL +HPV; 2013 neg pap +HPV;4/2016 neg pap +HPV; 5/16/17 Neg pap +HPV    Anemia     Depression     Eczema     H/O colposcopy with cervical biopsy 3/15 + 10/2013    neg    History of seizure 4/15/2016    One in 2008    Hypercholesterolemia 5/22/2018    Morbid obesity ([address])     Multiple sclerosis ([address])      Past Surgical History:   Procedure Laterality Date    BUNIONECTOMY      CHOLECYSTECTOMY  2017    Robotic-assisted laparoscopic cholecystectomy with firefly. COLPOSCOPY  6/2016; 6/8/17    neg    GYN  unsure    LEEP    LEEP  6/08    neg    ORTHOPEDIC SURGERY Right     Right bunionectomy. ORTHOPEDIC SURGERY Left 2012    Left bunionectomy.      Current Outpatient Medications   Medication Sig Dispense Refill    traZODone (DESYREL) 100 MG tablet Take 1 tablet by

## 2023-09-15 RX ORDER — DROSPIRENONE AND ETHINYL ESTRADIOL 0.02-3(28)
1 KIT ORAL DAILY
Qty: 3 PACKET | Refills: 4 | Status: SHIPPED | OUTPATIENT
Start: 2023-09-15

## 2023-09-15 RX ORDER — MEDROXYPROGESTERONE ACETATE 10 MG/1
10 TABLET ORAL DAILY
Qty: 10 TABLET | Refills: 0 | Status: SHIPPED | OUTPATIENT
Start: 2023-09-15

## 2023-10-03 NOTE — PROGRESS NOTES
Jessica Mcgrath is a 40 y.o. female presents for a problem visit. Chief Complaint   Patient presents with    Ultrasound     fibroids     Patient's last menstrual period was 09/21/2023 (approximate). Birth Control: OCP (estrogen/progesterone). Last Pap: 12/2/2021; NILM/ HPV-    The patient is reporting having: Ultrasound Follow Up for fibroids  DIFFICULT SCAN DUE TO PATIENT BODY HABITUS. TA AND TV SCANS PERFORMED FOR BEST VISUALIZATION. UTERUS IS ANTEVERTED, ENLARGED IN SIZE AND HETEROGENEOUS IN ECHOGENICITY. MULTIPLE FIBROIDS ARE SEEN. THE TWO LARGEST FIBROIDS ARE MEASURED. FIBROID 1, FUNDAL, SUBSEROSAL  FIBROID 2, RIGHT LATERAL, SUBSEROSAL  ENDOMETRIUM MEASURES 9-10MM IN THICKNESS. NO EVIDENCE OF MASSES OR ABNORMALITIES ARE SEEN. RIGHT OVARY APPEARS WITHIN NORMAL LIMITS. LEFT OVARY APPEARS WITHIN NORMAL LIMITS. NO FREE FLUID SEEN IN THE CDS. Examination chaperoned by Dread Nath LPN.

## 2023-10-05 ENCOUNTER — OFFICE VISIT (OUTPATIENT)
Age: 43
End: 2023-10-05
Payer: MEDICARE

## 2023-10-05 VITALS — DIASTOLIC BLOOD PRESSURE: 88 MMHG | WEIGHT: 276.8 LBS | SYSTOLIC BLOOD PRESSURE: 125 MMHG | BODY MASS INDEX: 43.35 KG/M2

## 2023-10-05 DIAGNOSIS — D21.9 FIBROIDS: Primary | ICD-10-CM

## 2023-10-05 PROCEDURE — G8484 FLU IMMUNIZE NO ADMIN: HCPCS | Performed by: OBSTETRICS & GYNECOLOGY

## 2023-10-05 PROCEDURE — G8427 DOCREV CUR MEDS BY ELIG CLIN: HCPCS | Performed by: OBSTETRICS & GYNECOLOGY

## 2023-10-05 PROCEDURE — G8417 CALC BMI ABV UP PARAM F/U: HCPCS | Performed by: OBSTETRICS & GYNECOLOGY

## 2023-10-05 PROCEDURE — 99212 OFFICE O/P EST SF 10 MIN: CPT | Performed by: OBSTETRICS & GYNECOLOGY

## 2023-10-05 PROCEDURE — 1036F TOBACCO NON-USER: CPT | Performed by: OBSTETRICS & GYNECOLOGY

## 2023-11-16 ENCOUNTER — PROCEDURE VISIT (OUTPATIENT)
Age: 43
End: 2023-11-16

## 2023-11-16 VITALS — DIASTOLIC BLOOD PRESSURE: 88 MMHG | SYSTOLIC BLOOD PRESSURE: 137 MMHG | WEIGHT: 275.6 LBS | BODY MASS INDEX: 43.17 KG/M2

## 2023-11-16 DIAGNOSIS — N93.9 ABNORMAL UTERINE BLEEDING: Primary | ICD-10-CM

## 2023-11-16 NOTE — PROGRESS NOTES
Vi Dominguez is a 44 y.o. female presents for a problem visit. Chief Complaint   Patient presents with    Procedure     EMB       Problems: Abnormal Bleeding       Patient's last menstrual period was 10/31/2023 (approximate). Birth Control: abstinence. Last Pap: 12/21/2021 NILM/ HPV-      Chart reviewed for the following:   ILianne LPN, have reviewed the History, Physical and updated the Allergic reactions for [place] performed immediately prior to start of procedure:   Joe Ponce LPN, have performed the following reviews on Vi Dominguez prior to the start of the procedure:            * Patient was identified by name and date of birth   * Agreement on procedure being performed was verified  * Risks and Benefits explained to the patient  * Procedure site verified and marked as necessary  * Patient was positioned for comfort  * Consent was signed and verified     Time: 1:40 PM    Date of procedure: 11/16/2023    Procedure performed by:  Ludivina Quinn MD       Provider assisted by: Daren Arteaga MA    Patient assisted by: self    How tolerated by patient: tolerated the procedure well with no complications    Post Procedural Pain Scale: 2 - Hurts Little Bit    Comments: none    Examination chaperoned by Lianne Zavala LPN.

## 2023-11-16 NOTE — PROGRESS NOTES
OB/GYN Problem VIsit    HPI  Satnam Saldana is a No obstetric history on file. ,  44 y.o. female who presents for a problem visit. Periods became irregular since August. Started on OCP in September. Bleeding erratic, very heavy in October. Passing small clots. Had [address] last month that showed fibroids but did not look like submucosal. Bleeding now - just started third pack        Past Medical History:   Diagnosis Date    Abnormal pap 3/2015; 4/2016; 5/16/17 2015 LGSIL +HPV; 2013 neg pap +HPV;4/2016 neg pap +HPV; 5/16/17 Neg pap +HPV    Anemia     Depression     Eczema     H/O colposcopy with cervical biopsy 3/15 + 10/2013    neg    History of seizure 4/15/2016    One in 2008    Hypercholesterolemia 5/22/2018    Morbid obesity ([address])     Multiple sclerosis ([address])      Past Surgical History:   Procedure Laterality Date    BUNIONECTOMY      CHOLECYSTECTOMY  2017    Robotic-assisted laparoscopic cholecystectomy with firefly. COLPOSCOPY  6/2016; 6/8/17    neg    GYN  unsure    LEEP    LEEP  6/08    neg    ORTHOPEDIC SURGERY Right     Right bunionectomy. ORTHOPEDIC SURGERY Left 2012    Left bunionectomy. Social History     Occupational History    Not on file   Tobacco Use    Smoking status: Never    Smokeless tobacco: Never   Vaping Use    Vaping Use: Never used   Substance and Sexual Activity    Alcohol use: Not Currently    Drug use: No    Sexual activity: Not Currently     Comment: over 8 years     Family History   Problem Relation Age of Onset    Diabetes Mother     Hypertension Mother     Mult Sclerosis Mother     Cancer Mother         breast    Breast Cancer Maternal Grandmother     No Known Problems Sister     Bipolar Disorder Father     Breast Cancer Mother        Allergies   Allergen Reactions    Penicillins Hives     **Pt tolerated Ancef graded challenge**  **Pt tolerated Ancef graded challenge**       Prior to Admission medications    Medication Sig Start Date End Date Taking?  Authorizing Provider

## 2024-03-26 ENCOUNTER — OFFICE VISIT (OUTPATIENT)
Facility: CLINIC | Age: 44
End: 2024-03-26
Payer: MEDICARE

## 2024-03-26 VITALS
HEART RATE: 103 BPM | DIASTOLIC BLOOD PRESSURE: 82 MMHG | BODY MASS INDEX: 42.82 KG/M2 | SYSTOLIC BLOOD PRESSURE: 130 MMHG | OXYGEN SATURATION: 99 % | WEIGHT: 272.8 LBS | TEMPERATURE: 97.5 F | HEIGHT: 67 IN

## 2024-03-26 DIAGNOSIS — E78.00 HYPERCHOLESTEROLEMIA: ICD-10-CM

## 2024-03-26 DIAGNOSIS — J45.40 MODERATE PERSISTENT ASTHMA WITHOUT COMPLICATION: ICD-10-CM

## 2024-03-26 DIAGNOSIS — E55.9 VITAMIN D DEFICIENCY: ICD-10-CM

## 2024-03-26 DIAGNOSIS — D64.9 ANEMIA, UNSPECIFIED TYPE: ICD-10-CM

## 2024-03-26 DIAGNOSIS — Z00.00 ENCOUNTER FOR MEDICAL EXAMINATION TO ESTABLISH CARE: Primary | ICD-10-CM

## 2024-03-26 DIAGNOSIS — E66.01 OBESITY, CLASS III, BMI 40-49.9 (MORBID OBESITY) (HCC): ICD-10-CM

## 2024-03-26 DIAGNOSIS — G35 MS (MULTIPLE SCLEROSIS) (HCC): ICD-10-CM

## 2024-03-26 PROCEDURE — G8484 FLU IMMUNIZE NO ADMIN: HCPCS | Performed by: FAMILY MEDICINE

## 2024-03-26 PROCEDURE — G8417 CALC BMI ABV UP PARAM F/U: HCPCS | Performed by: FAMILY MEDICINE

## 2024-03-26 PROCEDURE — 1036F TOBACCO NON-USER: CPT | Performed by: FAMILY MEDICINE

## 2024-03-26 PROCEDURE — 99204 OFFICE O/P NEW MOD 45 MIN: CPT | Performed by: FAMILY MEDICINE

## 2024-03-26 PROCEDURE — G8427 DOCREV CUR MEDS BY ELIG CLIN: HCPCS | Performed by: FAMILY MEDICINE

## 2024-03-26 RX ORDER — HYDROXYZINE PAMOATE 50 MG/1
CAPSULE ORAL
COMMUNITY

## 2024-03-26 RX ORDER — DROSPIRENONE AND ETHINYL ESTRADIOL 0.02-3(28)
1 KIT ORAL DAILY
COMMUNITY
Start: 2024-02-28

## 2024-03-26 RX ORDER — MONTELUKAST SODIUM 10 MG/1
10 TABLET ORAL NIGHTLY
COMMUNITY
Start: 2024-02-11

## 2024-03-26 RX ORDER — IBUPROFEN 800 MG/1
800 TABLET ORAL DAILY PRN
COMMUNITY
Start: 2020-12-18

## 2024-03-26 RX ORDER — PREGABALIN 50 MG/1
CAPSULE ORAL
COMMUNITY
Start: 2024-01-18 | End: 2024-03-26

## 2024-03-26 RX ORDER — PREGABALIN 75 MG/1
75 CAPSULE ORAL 2 TIMES DAILY
COMMUNITY
Start: 2024-02-23

## 2024-03-26 RX ORDER — FLUTICASONE FUROATE, UMECLIDINIUM BROMIDE AND VILANTEROL TRIFENATATE 200; 62.5; 25 UG/1; UG/1; UG/1
POWDER RESPIRATORY (INHALATION)
COMMUNITY
Start: 2024-03-12

## 2024-03-26 RX ORDER — TRAZODONE HYDROCHLORIDE 50 MG/1
50 TABLET ORAL NIGHTLY
COMMUNITY

## 2024-03-26 RX ORDER — ARIPIPRAZOLE 15 MG/1
TABLET ORAL
COMMUNITY
Start: 2023-09-15

## 2024-03-26 RX ORDER — SIPONIMOD 2 MG/1
TABLET, FILM COATED ORAL
COMMUNITY
Start: 2022-05-31

## 2024-03-26 RX ORDER — ALBUTEROL SULFATE 90 UG/1
AEROSOL, METERED RESPIRATORY (INHALATION)
COMMUNITY
Start: 2023-02-23

## 2024-03-26 SDOH — ECONOMIC STABILITY: FOOD INSECURITY: WITHIN THE PAST 12 MONTHS, THE FOOD YOU BOUGHT JUST DIDN'T LAST AND YOU DIDN'T HAVE MONEY TO GET MORE.: NEVER TRUE

## 2024-03-26 SDOH — HEALTH STABILITY: PHYSICAL HEALTH: ON AVERAGE, HOW MANY DAYS PER WEEK DO YOU ENGAGE IN MODERATE TO STRENUOUS EXERCISE (LIKE A BRISK WALK)?: 2 DAYS

## 2024-03-26 SDOH — ECONOMIC STABILITY: FOOD INSECURITY: WITHIN THE PAST 12 MONTHS, YOU WORRIED THAT YOUR FOOD WOULD RUN OUT BEFORE YOU GOT MONEY TO BUY MORE.: NEVER TRUE

## 2024-03-26 SDOH — ECONOMIC STABILITY: HOUSING INSECURITY
IN THE LAST 12 MONTHS, WAS THERE A TIME WHEN YOU DID NOT HAVE A STEADY PLACE TO SLEEP OR SLEPT IN A SHELTER (INCLUDING NOW)?: NO

## 2024-03-26 SDOH — ECONOMIC STABILITY: INCOME INSECURITY: HOW HARD IS IT FOR YOU TO PAY FOR THE VERY BASICS LIKE FOOD, HOUSING, MEDICAL CARE, AND HEATING?: NOT HARD AT ALL

## 2024-03-26 ASSESSMENT — PATIENT HEALTH QUESTIONNAIRE - PHQ9
3. TROUBLE FALLING OR STAYING ASLEEP: NOT AT ALL
4. FEELING TIRED OR HAVING LITTLE ENERGY: NOT AT ALL
7. TROUBLE CONCENTRATING ON THINGS, SUCH AS READING THE NEWSPAPER OR WATCHING TELEVISION: NOT AT ALL
SUM OF ALL RESPONSES TO PHQ QUESTIONS 1-9: 6
5. POOR APPETITE OR OVEREATING: NOT AT ALL
SUM OF ALL RESPONSES TO PHQ9 QUESTIONS 1 & 2: 4
10. IF YOU CHECKED OFF ANY PROBLEMS, HOW DIFFICULT HAVE THESE PROBLEMS MADE IT FOR YOU TO DO YOUR WORK, TAKE CARE OF THINGS AT HOME, OR GET ALONG WITH OTHER PEOPLE: SOMEWHAT DIFFICULT
6. FEELING BAD ABOUT YOURSELF - OR THAT YOU ARE A FAILURE OR HAVE LET YOURSELF OR YOUR FAMILY DOWN: MORE THAN HALF THE DAYS
SUM OF ALL RESPONSES TO PHQ QUESTIONS 1-9: 6
1. LITTLE INTEREST OR PLEASURE IN DOING THINGS: MORE THAN HALF THE DAYS
SUM OF ALL RESPONSES TO PHQ QUESTIONS 1-9: 6
9. THOUGHTS THAT YOU WOULD BE BETTER OFF DEAD, OR OF HURTING YOURSELF: NOT AT ALL
2. FEELING DOWN, DEPRESSED OR HOPELESS: MORE THAN HALF THE DAYS
SUM OF ALL RESPONSES TO PHQ QUESTIONS 1-9: 6
8. MOVING OR SPEAKING SO SLOWLY THAT OTHER PEOPLE COULD HAVE NOTICED. OR THE OPPOSITE, BEING SO FIGETY OR RESTLESS THAT YOU HAVE BEEN MOVING AROUND A LOT MORE THAN USUAL: NOT AT ALL

## 2024-03-26 NOTE — PROGRESS NOTES
Chief Complaint   Patient presents with    Follow-up     Establish care/ needed a new pcp due to old pcp stopped seeing pt's      /82 (Site: Left Upper Arm, Position: Sitting, Cuff Size: Large Adult)   Pulse (!) 103   Temp 97.5 °F (36.4 °C) (Temporal)   Ht 1.702 m (5' 7\")   Wt 123.7 kg (272 lb 12.8 oz)   LMP 02/25/2024   SpO2 99%   BMI 42.73 kg/m²       \"Have you been to the ER, urgent care clinic since your last visit?  Hospitalized since your last visit?\"    YES - When: approximately 1 months ago.  Where and Why: [facility]. Sinus Infection.    “Have you seen or consulted any other health care providers outside of [facility] since your last visit?”    NO            Click Here for Release of Records Request   PHQ-9 Total Score: 6 (3/26/2024  2:14 PM)  Thoughts that you would be better off dead, or of hurting yourself in some way: 0 (3/26/2024  2:14 PM)

## 2024-03-26 NOTE — PROGRESS NOTES
[facility]      HPI: Pt is a 43 y.o. female who presents for establish care.     L forearm pain: For the past week or so she has had a shooting pain from her L elbow to hand whenever she reaches behind her body (internal rotation). Associated with locking of the fingers of the L hand. It only happens with this movement. She has a h/o fall 5 years ago and had to get hardware placed in her L upper arm. She had not had an issue with it until now and is no longer seeing Ortho. She does have MS.     Anemia: She has had this in the past and has noticed she is chewing more ice. She is not taking an iron supplement currently. She used to have heavy and irregular periods but is now on OCP's per GYN which has improved this.    Anxiety/Depression: Sees a therapist and a Psychiatry NP.     Asthma: Follows with [facility], takes Trelegy, albuterol, and Singulair.     MS: Follows with Neurology, Dr. Reid, and on Mayzent. Symptoms stable with this. She also works with PT for this. Walks with a rollator.    Spinal stenosis: Follows with Pain Management at [facility].    Past Medical History:   Diagnosis Date    Abnormal pap 3/2015; 4/2016; 5/16/17 2015 LGSIL +HPV; 2013 neg pap +HPV;4/2016 neg pap +HPV; 5/16/17 Neg pap +HPV    Anemia     Anxiety 2017    Asthma 2022    Depression     Eczema     H/O colposcopy with cervical biopsy 3/15 + 10/2013    neg    History of seizure 4/15/2016    One in 2008    Hypercholesterolemia 5/22/2018    Morbid obesity (HCC)     Multiple sclerosis (HCC)     Seizures (HCC) 2008       Family History   Problem Relation Age of Onset    Diabetes Mother     Hypertension Mother     Mult Sclerosis Mother     Cancer Mother         breast    Breast Cancer Mother     Breast Cancer Maternal Grandmother     No Known Problems Sister     Bipolar Disorder Father     Alcohol Abuse Father     Mental Illness Father     Other Father        Social History     Tobacco Use    Smoking status: Never    Smokeless

## 2024-03-27 LAB
25(OH)D3+25(OH)D2 SERPL-MCNC: 42.5 NG/ML (ref 30–100)
ALBUMIN SERPL-MCNC: 4.3 G/DL (ref 3.9–4.9)
ALBUMIN/GLOB SERPL: 1.3 {RATIO} (ref 1.2–2.2)
ALP SERPL-CCNC: 111 IU/L (ref 44–121)
ALT SERPL-CCNC: 12 IU/L (ref 0–32)
AST SERPL-CCNC: 21 IU/L (ref 0–40)
BASOPHILS # BLD AUTO: 0.1 X10E3/UL (ref 0–0.2)
BASOPHILS NFR BLD AUTO: 1 %
BILIRUB SERPL-MCNC: <0.2 MG/DL (ref 0–1.2)
BUN SERPL-MCNC: 11 MG/DL (ref 6–24)
BUN/CREAT SERPL: 15 (ref 9–23)
CALCIUM SERPL-MCNC: 9.9 MG/DL (ref 8.7–10.2)
CHLORIDE SERPL-SCNC: 102 MMOL/L (ref 96–106)
CHOLEST SERPL-MCNC: 200 MG/DL (ref 100–199)
CO2 SERPL-SCNC: 20 MMOL/L (ref 20–29)
CREAT SERPL-MCNC: 0.74 MG/DL (ref 0.57–1)
EGFRCR SERPLBLD CKD-EPI 2021: 104 ML/MIN/1.73
EOSINOPHIL # BLD AUTO: 0.1 X10E3/UL (ref 0–0.4)
EOSINOPHIL NFR BLD AUTO: 1 %
ERYTHROCYTE [DISTWIDTH] IN BLOOD BY AUTOMATED COUNT: 17.8 % (ref 11.7–15.4)
FOLATE SERPL-MCNC: 10.4 NG/ML
GLOBULIN SER CALC-MCNC: 3.2 G/DL (ref 1.5–4.5)
GLUCOSE SERPL-MCNC: 113 MG/DL (ref 70–99)
HCT VFR BLD AUTO: 28.5 % (ref 34–46.6)
HDLC SERPL-MCNC: 66 MG/DL
HGB BLD-MCNC: 8 G/DL (ref 11.1–15.9)
IMM GRANULOCYTES # BLD AUTO: 0 X10E3/UL (ref 0–0.1)
IMM GRANULOCYTES NFR BLD AUTO: 0 %
LDLC SERPL CALC-MCNC: 103 MG/DL (ref 0–99)
LYMPHOCYTES # BLD AUTO: 0.8 X10E3/UL (ref 0.7–3.1)
LYMPHOCYTES NFR BLD AUTO: 10 %
MCH RBC QN AUTO: 17.7 PG (ref 26.6–33)
MCHC RBC AUTO-ENTMCNC: 28.1 G/DL (ref 31.5–35.7)
MCV RBC AUTO: 63 FL (ref 79–97)
MONOCYTES # BLD AUTO: 0.7 X10E3/UL (ref 0.1–0.9)
MONOCYTES NFR BLD AUTO: 9 %
NEUTROPHILS # BLD AUTO: 6.2 X10E3/UL (ref 1.4–7)
NEUTROPHILS NFR BLD AUTO: 79 %
PLATELET # BLD AUTO: 537 X10E3/UL (ref 150–450)
POTASSIUM SERPL-SCNC: 4.4 MMOL/L (ref 3.5–5.2)
PROT SERPL-MCNC: 7.5 G/DL (ref 6–8.5)
RBC # BLD AUTO: 4.53 X10E6/UL (ref 3.77–5.28)
SODIUM SERPL-SCNC: 139 MMOL/L (ref 134–144)
TRIGL SERPL-MCNC: 180 MG/DL (ref 0–149)
TSH SERPL DL<=0.005 MIU/L-ACNC: 1.89 UIU/ML (ref 0.45–4.5)
VIT B12 SERPL-MCNC: 428 PG/ML (ref 232–1245)
VLDLC SERPL CALC-MCNC: 31 MG/DL (ref 5–40)
WBC # BLD AUTO: 7.8 X10E3/UL (ref 3.4–10.8)

## 2024-03-28 DIAGNOSIS — R73.01 IMPAIRED FASTING GLUCOSE: ICD-10-CM

## 2024-03-28 DIAGNOSIS — D50.0 IRON DEFICIENCY ANEMIA DUE TO CHRONIC BLOOD LOSS: Primary | ICD-10-CM

## 2024-03-28 DIAGNOSIS — D50.0 IRON DEFICIENCY ANEMIA DUE TO CHRONIC BLOOD LOSS: ICD-10-CM

## 2024-03-28 RX ORDER — FERROUS SULFATE 325(65) MG
325 TABLET ORAL
Qty: 90 TABLET | Refills: 1 | Status: SHIPPED | OUTPATIENT
Start: 2024-03-28

## 2024-04-12 ENCOUNTER — TELEMEDICINE (OUTPATIENT)
Facility: CLINIC | Age: 44
End: 2024-04-12
Payer: MEDICARE

## 2024-04-12 DIAGNOSIS — N30.90 CYSTITIS: Primary | ICD-10-CM

## 2024-04-12 DIAGNOSIS — R35.0 URINARY FREQUENCY: ICD-10-CM

## 2024-04-12 LAB
BILIRUBIN, URINE, POC: NEGATIVE
BLOOD URINE, POC: ABNORMAL
GLUCOSE URINE, POC: NEGATIVE
KETONES, URINE, POC: NEGATIVE
LEUKOCYTE ESTERASE, URINE, POC: ABNORMAL
NITRITE, URINE, POC: POSITIVE
PH, URINE, POC: 6 (ref 4.6–8)
PROTEIN,URINE, POC: 30
SPECIFIC GRAVITY, URINE, POC: 1.03 (ref 1–1.03)
URINALYSIS CLARITY, POC: ABNORMAL
URINALYSIS COLOR, POC: YELLOW
UROBILINOGEN, POC: ABNORMAL

## 2024-04-12 PROCEDURE — G8417 CALC BMI ABV UP PARAM F/U: HCPCS | Performed by: FAMILY MEDICINE

## 2024-04-12 PROCEDURE — G8427 DOCREV CUR MEDS BY ELIG CLIN: HCPCS | Performed by: FAMILY MEDICINE

## 2024-04-12 PROCEDURE — 1036F TOBACCO NON-USER: CPT | Performed by: FAMILY MEDICINE

## 2024-04-12 PROCEDURE — 99213 OFFICE O/P EST LOW 20 MIN: CPT | Performed by: FAMILY MEDICINE

## 2024-04-12 PROCEDURE — 81003 URINALYSIS AUTO W/O SCOPE: CPT | Performed by: FAMILY MEDICINE

## 2024-04-12 RX ORDER — CEPHALEXIN 500 MG/1
500 CAPSULE ORAL 4 TIMES DAILY
Qty: 20 CAPSULE | Refills: 0 | Status: SHIPPED | OUTPATIENT
Start: 2024-04-12 | End: 2024-04-17

## 2024-04-12 ASSESSMENT — PATIENT HEALTH QUESTIONNAIRE - PHQ9
SUM OF ALL RESPONSES TO PHQ QUESTIONS 1-9: 0
SUM OF ALL RESPONSES TO PHQ QUESTIONS 1-9: 0
1. LITTLE INTEREST OR PLEASURE IN DOING THINGS: NOT AT ALL
2. FEELING DOWN, DEPRESSED OR HOPELESS: NOT AT ALL
SUM OF ALL RESPONSES TO PHQ QUESTIONS 1-9: 0
SUM OF ALL RESPONSES TO PHQ QUESTIONS 1-9: 0
SUM OF ALL RESPONSES TO PHQ9 QUESTIONS 1 & 2: 0

## 2024-04-12 NOTE — PROGRESS NOTES
Pat Szymanski is a 43 y.o. female who was seen by synchronous (real-time) audio-video technology.     Consent:  Patient and/or their healthcare decision maker is aware that this patient-initiated Telehealth encounter is a billable service, with coverage as determined by their insurance carrier. They are aware that they may receive a bill and have provided verbal consent to proceed: Yes    I was at home while conducting this encounter.    Platform: Teleus    HPI: Pt is a 43 y.o. female who presents for urinary frequency. Has been present for the past week. No dysuria. She did have some blood in the urine but has also had an irregular period so was not sure where that was coming from. Denies fevers, N/V, and new low back pain. She has not had frequent UTI's.     Of note, there was a question whether she had been able to see her lab results and message. Pt states she was able to see this and does not have any questions about her labs.     Also of note she has an allergy to PCN but per the chart has tolerated Ancef graded challenge.       Past Medical History:   Diagnosis Date    Abnormal pap 3/2015; 4/2016; 5/16/17 2015 LGSIL +HPV; 2013 neg pap +HPV;4/2016 neg pap +HPV; 5/16/17 Neg pap +HPV    Anemia     Anxiety 2017    Asthma 2022    Depression     Eczema     H/O colposcopy with cervical biopsy 3/15 + 10/2013    neg    History of seizure 4/15/2016    One in 2008    Hypercholesterolemia 5/22/2018    Morbid obesity (HCC)     Multiple sclerosis (HCC)     Seizures (HCC) 2008       Family History   Problem Relation Age of Onset    Diabetes Mother     Hypertension Mother     Mult Sclerosis Mother     Cancer Mother         breast    Breast Cancer Mother     Breast Cancer Maternal Grandmother     No Known Problems Sister     Bipolar Disorder Father     Alcohol Abuse Father     Mental Illness Father     Other Father        Social History     Tobacco Use    Smoking status: Never    Smokeless tobacco: Never   Vaping Use

## 2024-04-12 NOTE — PROGRESS NOTES
Chief Complaint   Patient presents with    Urinary Frequency       \"Have you been to the ER, urgent care clinic since your last visit?  Hospitalized since your last visit?\"    NO    “Have you seen or consulted any other health care providers outside of [facility] since your last visit?”    NO            Click Here for Release of Records Request    PHQ-9 Total Score: 0 (4/12/2024  2:49 PM)

## 2024-04-16 LAB
BACTERIA UR CULT: ABNORMAL
BACTERIA UR CULT: ABNORMAL

## 2024-05-08 DIAGNOSIS — R26.81 GAIT INSTABILITY: ICD-10-CM

## 2024-05-08 DIAGNOSIS — G35 MS (MULTIPLE SCLEROSIS) (HCC): Primary | ICD-10-CM

## 2024-05-10 DIAGNOSIS — D50.0 IRON DEFICIENCY ANEMIA DUE TO CHRONIC BLOOD LOSS: ICD-10-CM

## 2024-05-10 RX ORDER — FERROUS SULFATE 325(65) MG
325 TABLET ORAL 2 TIMES DAILY WITH MEALS
Qty: 180 TABLET | Refills: 1 | Status: SHIPPED | OUTPATIENT
Start: 2024-05-10

## 2024-05-28 ENCOUNTER — NURSE ONLY (OUTPATIENT)
Facility: CLINIC | Age: 44
End: 2024-05-28

## 2024-05-29 LAB
HBA1C MFR BLD: 5.1 % (ref 4.8–5.6)
HCT VFR BLD AUTO: 42.1 % (ref 34–46.6)
HGB BLD-MCNC: 13.4 G/DL (ref 11.1–15.9)
MCH RBC QN AUTO: 26.2 PG (ref 26.6–33)
MCHC RBC AUTO-ENTMCNC: 31.8 G/DL (ref 31.5–35.7)
MCV RBC AUTO: 82 FL (ref 79–97)
PLATELET # BLD AUTO: 441 X10E3/UL (ref 150–450)
RBC # BLD AUTO: 5.12 X10E6/UL (ref 3.77–5.28)
WBC # BLD AUTO: 7.8 X10E3/UL (ref 3.4–10.8)

## 2024-05-30 ENCOUNTER — TELEPHONE (OUTPATIENT)
Facility: CLINIC | Age: 44
End: 2024-05-30

## 2024-05-30 DIAGNOSIS — D50.0 IRON DEFICIENCY ANEMIA DUE TO CHRONIC BLOOD LOSS: Primary | ICD-10-CM

## 2024-05-30 DIAGNOSIS — D50.0 IRON DEFICIENCY ANEMIA DUE TO CHRONIC BLOOD LOSS: ICD-10-CM

## 2024-05-30 RX ORDER — FERROUS SULFATE 325(65) MG
325 TABLET ORAL
Qty: 180 TABLET | Refills: 1 | Status: SHIPPED
Start: 2024-05-30

## 2024-05-30 NOTE — TELEPHONE ENCOUNTER
Patient called and scheduled labs for 3 months. Can we please get orders placed for lab appointment scheduled for August 29th, 2024?

## 2024-06-25 DIAGNOSIS — Z91.89 AT RISK FOR OBSTRUCTIVE SLEEP APNEA: Primary | ICD-10-CM

## 2024-07-08 ENCOUNTER — PATIENT MESSAGE (OUTPATIENT)
Facility: CLINIC | Age: 44
End: 2024-07-08

## 2024-07-09 NOTE — TELEPHONE ENCOUNTER
From: Pat Szymanski  To: Dr. Vanessa Emanuel  Sent: 7/8/2024 5:18 PM EDT  Subject: Home care    [facility] said they’ll need a referral from you. Thanks

## 2024-07-18 ENCOUNTER — TELEPHONE (OUTPATIENT)
Facility: CLINIC | Age: 44
End: 2024-07-18

## 2024-07-18 NOTE — TELEPHONE ENCOUNTER
She doesn't need Home Health, she just wants the aide. I told her I didn't think this was something we needed to place an order for but they sent us the paperwork.     Thanks!

## 2024-07-18 NOTE — TELEPHONE ENCOUNTER
Juan Manuel [facility] called in regards to the referral that they received that only specifies a home health aid. Stated that the order would require other disciplines to be able to be processed like PT,OT, etc.

## 2024-07-19 NOTE — TELEPHONE ENCOUNTER
Spoke to Chris and advised him of providers statements, he stated that they can't see patient for just the aide she would need additional disciplines in order for them to go out.  Message sent to patient via [employer].

## 2024-07-26 ENCOUNTER — TELEPHONE (OUTPATIENT)
Facility: CLINIC | Age: 44
End: 2024-07-26

## 2024-07-26 DIAGNOSIS — D50.0 IRON DEFICIENCY ANEMIA DUE TO CHRONIC BLOOD LOSS: ICD-10-CM

## 2024-07-26 NOTE — TELEPHONE ENCOUNTER
----- Message from Chaitanya Ramirezpaola sent at 7/26/2024 11:06 AM EDT -----  Regarding: [facility] Appointment Request  [facility] Appointment Request    Patient needs appointment for [facility] Appointment Type: Existing Condition Follow Up.    Patient Requested Dates(s): Tue/Thu/Fri appointment  Patient Requested Time: afternoon time  Provider Name: Vanessa Emanuel MD    Reason for Appointment Request: Established Patient - Available appointments did not meet patient need /Patient requested to reschedule this appointment (8/23/2024) because she need to see the provider sooner in order to get pt.  --------------------------------------------------------------------------------------------------------------------------    Relationship to Patient: Self     Call Back Information: OK to leave message on voicemail  Preferred Call Back Number: Phone  [phone]

## 2024-07-27 RX ORDER — FERROUS SULFATE 325(65) MG
1 TABLET ORAL
Qty: 90 TABLET | Refills: 1 | Status: SHIPPED | OUTPATIENT
Start: 2024-07-27

## 2024-08-02 ENCOUNTER — OFFICE VISIT (OUTPATIENT)
Age: 44
End: 2024-08-02
Payer: MEDICARE

## 2024-08-02 VITALS — DIASTOLIC BLOOD PRESSURE: 87 MMHG | SYSTOLIC BLOOD PRESSURE: 129 MMHG

## 2024-08-02 DIAGNOSIS — N93.9 ABNORMAL UTERINE BLEEDING: Primary | ICD-10-CM

## 2024-08-02 LAB — HEMOGLOBIN, POC: 12.1 G/DL

## 2024-08-02 PROCEDURE — 1036F TOBACCO NON-USER: CPT | Performed by: OBSTETRICS & GYNECOLOGY

## 2024-08-02 PROCEDURE — 99213 OFFICE O/P EST LOW 20 MIN: CPT | Performed by: OBSTETRICS & GYNECOLOGY

## 2024-08-02 PROCEDURE — 85018 HEMOGLOBIN: CPT | Performed by: OBSTETRICS & GYNECOLOGY

## 2024-08-02 PROCEDURE — G8417 CALC BMI ABV UP PARAM F/U: HCPCS | Performed by: OBSTETRICS & GYNECOLOGY

## 2024-08-02 PROCEDURE — G8427 DOCREV CUR MEDS BY ELIG CLIN: HCPCS | Performed by: OBSTETRICS & GYNECOLOGY

## 2024-08-02 RX ORDER — LEVONORGESTREL / ETHINYL ESTRADIOL AND ETHINYL ESTRADIOL 150-30(84)
1 KIT ORAL DAILY
Qty: 1 PACKET | Refills: 2 | Status: SHIPPED | OUTPATIENT
Start: 2024-08-02

## 2024-08-02 NOTE — PROGRESS NOTES
Pat Szymanski is a 43 y.o. female presents for a problem visit.    Chief Complaint   Patient presents with    Heavy Bleeding     Patient's last menstrual period was 07/16/2024.  Birth Control: OCP (estrogen/progesterone) and abstinence.  Last Pap: 12/02/2021 WNL    The patient is reporting having: heavy cycles with clots coming the week after her placebo pills on birth control since ~3/2024. Soaks through 2 overnight pads. Cycles lasting longer than 7 days sometimes. No cramping.     Pt is physically challenged & would like to just discuss sx with Dr. Francisco.     HGB today 12.1    She reports the symptoms are is unchanged.  Aggravating factors include none.  And alleviating factors include none.        Examination chaperoned by Mikaela Balderas MA.

## 2024-08-02 NOTE — PROGRESS NOTES
OB/GYN Problem VIsit    HPI  Pat Szymanski is a No obstetric history on file.,  43 y.o. female who presents for a problem visit.  Known history of uterine fibroids.  Last ultrasound was in the fall 2023 did not show any submucosal fibroids.  She has been taking Bettie and previously had good results but is now having heavier bleeding.  She wears 2 overnight pads at a time because she can soak through onto the furniture.  She is in a wheelchair due to her history of MS.  Previous endometrial biopsy done November 2023 is normal.    Patient's last menstrual period was 07/16/2024.  Birth Control: OCP (estrogen/progesterone) and abstinence.  Last Pap: 12/02/2021 WNL     She reports the symptoms are is unchanged.  Aggravating factors include none.  And alleviating factors include none.    Past Medical History:   Diagnosis Date    Abnormal pap 3/2015; 4/2016; 5/16/17 2015 LGSIL +HPV; 2013 neg pap +HPV;4/2016 neg pap +HPV; 5/16/17 Neg pap +HPV    Anemia     Anxiety 2017    Asthma 2022    Depression     Eczema     H/O colposcopy with cervical biopsy 3/15 + 10/2013    neg    History of seizure 4/15/2016    One in 2008    Hypercholesterolemia 5/22/2018    Morbid obesity (HCC)     Multiple sclerosis (HCC)     Seizures (HCC) 2008     Past Surgical History:   Procedure Laterality Date    BUNIONECTOMY      CHOLECYSTECTOMY  2017    Robotic-assisted laparoscopic cholecystectomy with firefly.    COLPOSCOPY  6/2016; 6/8/17    neg    GYN  unsure    LEEP    LEEP  6/08    neg    ORTHOPEDIC SURGERY Right     Right bunionectomy.    ORTHOPEDIC SURGERY Left 2012    Left bunionectomy.     Social History     Occupational History    Not on file   Tobacco Use    Smoking status: Never    Smokeless tobacco: Never   Vaping Use    Vaping Use: Never used   Substance and Sexual Activity    Alcohol use: Not Currently    Drug use: No    Sexual activity: Not Currently     Partners: Male     Birth control/protection: Abstinence     Comment: over 10 years

## 2024-08-06 ENCOUNTER — TELEMEDICINE (OUTPATIENT)
Facility: CLINIC | Age: 44
End: 2024-08-06
Payer: MEDICARE

## 2024-08-06 DIAGNOSIS — F33.2 MAJOR DEPRESSIVE DISORDER, RECURRENT SEVERE WITHOUT PSYCHOTIC FEATURES (HCC): ICD-10-CM

## 2024-08-06 DIAGNOSIS — N30.90 CYSTITIS: Primary | ICD-10-CM

## 2024-08-06 LAB
BILIRUBIN, URINE, POC: NEGATIVE
BLOOD URINE, POC: ABNORMAL
GLUCOSE URINE, POC: NEGATIVE
KETONES, URINE, POC: NEGATIVE
LEUKOCYTE ESTERASE, URINE, POC: ABNORMAL
NITRITE, URINE, POC: NEGATIVE
PH, URINE, POC: 6.5 (ref 4.6–8)
PROTEIN,URINE, POC: NEGATIVE
SPECIFIC GRAVITY, URINE, POC: 1.01 (ref 1–1.03)
URINALYSIS CLARITY, POC: ABNORMAL
URINALYSIS COLOR, POC: YELLOW
UROBILINOGEN, POC: ABNORMAL

## 2024-08-06 PROCEDURE — G8417 CALC BMI ABV UP PARAM F/U: HCPCS | Performed by: FAMILY MEDICINE

## 2024-08-06 PROCEDURE — 99213 OFFICE O/P EST LOW 20 MIN: CPT | Performed by: FAMILY MEDICINE

## 2024-08-06 PROCEDURE — 1036F TOBACCO NON-USER: CPT | Performed by: FAMILY MEDICINE

## 2024-08-06 PROCEDURE — G8427 DOCREV CUR MEDS BY ELIG CLIN: HCPCS | Performed by: FAMILY MEDICINE

## 2024-08-06 PROCEDURE — 81003 URINALYSIS AUTO W/O SCOPE: CPT | Performed by: FAMILY MEDICINE

## 2024-08-06 RX ORDER — KETOCONAZOLE 20 MG/G
CREAM TOPICAL
COMMUNITY
Start: 2024-06-27

## 2024-08-06 RX ORDER — ARIPIPRAZOLE 20 MG/1
20 TABLET ORAL DAILY
COMMUNITY
Start: 2024-06-20

## 2024-08-06 NOTE — PROGRESS NOTES
Chief Complaint   Patient presents with    Urinary Tract Infection       \"Have you been to the ER, urgent care clinic since your last visit?  Hospitalized since your last visit?\"    NO    “Have you seen or consulted any other health care providers outside of [facility] since your last visit?”    NO            Click Here for Release of Records Request    PLEASE SEND LINK [phone]

## 2024-08-06 NOTE — PROGRESS NOTES
Pat Szymanski is a 43 y.o. female who was seen by synchronous (real-time) audio-video technology.     Consent:  Patient and/or their healthcare decision maker is aware that this patient-initiated Telehealth encounter is a billable service, with coverage as determined by their insurance carrier. They are aware that they may receive a bill and have provided verbal consent to proceed: Yes    I was  in the office  while conducting this encounter.    Platform: gripNote    HPI: Pt is a 43 y.o. female who presents for     Concern for UTI: She has had increased urinary frequency and urgency. No dysuria or hematuria, fevers, or new low back pain. No vaginal discharge. Associated with fatigue. She has tried some Azo.     Depression: Takes Abilify, Trintellix and trazodone from Psychiatry. She feels like her mood is stable with this.       Past Medical History:   Diagnosis Date    Abnormal pap 3/2015; 4/2016; 5/16/17 2015 LGSIL +HPV; 2013 neg pap +HPV;4/2016 neg pap +HPV; 5/16/17 Neg pap +HPV    Anemia     Anxiety 2017    Asthma 2022    Depression     Eczema     H/O colposcopy with cervical biopsy 3/15 + 10/2013    neg    History of seizure 4/15/2016    One in 2008    Hypercholesterolemia 5/22/2018    Morbid obesity (HCC)     Multiple sclerosis (HCC)     Seizures (HCC) 2008       Family History   Problem Relation Age of Onset    Diabetes Mother     Hypertension Mother     Mult Sclerosis Mother     Cancer Mother         breast    Breast Cancer Mother     Breast Cancer Maternal Grandmother     No Known Problems Sister     Bipolar Disorder Father     Alcohol Abuse Father     Mental Illness Father     Other Father        Social History     Tobacco Use    Smoking status: Never    Smokeless tobacco: Never   Vaping Use    Vaping Use: Never used   Substance Use Topics    Alcohol use: Not Currently    Drug use: No       ROS:  Per HPI    PE:  LMP 07/16/2024   Gen: Pt in NAD  Head: Normocephalic, atraumatic  Eyes: Sclera anicteric,

## 2024-08-08 DIAGNOSIS — N30.90 CYSTITIS: Primary | ICD-10-CM

## 2024-08-08 RX ORDER — SULFAMETHOXAZOLE AND TRIMETHOPRIM 800; 160 MG/1; MG/1
1 TABLET ORAL 2 TIMES DAILY
Qty: 10 TABLET | Refills: 0 | Status: SHIPPED | OUTPATIENT
Start: 2024-08-08 | End: 2024-08-13

## 2024-08-10 LAB — BACTERIA UR CULT: ABNORMAL

## 2024-08-12 LAB — BACTERIA UR CULT: ABNORMAL

## 2024-08-16 ENCOUNTER — TELEMEDICINE (OUTPATIENT)
Facility: CLINIC | Age: 44
End: 2024-08-16
Payer: MEDICARE

## 2024-08-16 DIAGNOSIS — R26.81 GAIT INSTABILITY: ICD-10-CM

## 2024-08-16 DIAGNOSIS — G35 MS (MULTIPLE SCLEROSIS) (HCC): Primary | ICD-10-CM

## 2024-08-16 DIAGNOSIS — N30.90 RECURRENT CYSTITIS: ICD-10-CM

## 2024-08-16 PROCEDURE — 1036F TOBACCO NON-USER: CPT | Performed by: FAMILY MEDICINE

## 2024-08-16 PROCEDURE — G8417 CALC BMI ABV UP PARAM F/U: HCPCS | Performed by: FAMILY MEDICINE

## 2024-08-16 PROCEDURE — 99214 OFFICE O/P EST MOD 30 MIN: CPT | Performed by: FAMILY MEDICINE

## 2024-08-16 PROCEDURE — G8427 DOCREV CUR MEDS BY ELIG CLIN: HCPCS | Performed by: FAMILY MEDICINE

## 2024-08-16 NOTE — PROGRESS NOTES
Chief Complaint   Patient presents with    Knee Pain       \"Have you been to the ER, urgent care clinic since your last visit?  Hospitalized since your last visit?\"    NO    “Have you seen or consulted any other health care providers outside of [facility] since your last visit?”    NO            Click Here for Release of Records Request      PATIENT IS LOGGED IN TO [employer].

## 2024-08-16 NOTE — PROGRESS NOTES
Pat Szymanski is a 43 y.o. female who was seen by synchronous (real-time) audio-video technology.     Consent:  Patient and/or their healthcare decision maker is aware that this patient-initiated Telehealth encounter is a billable service, with coverage as determined by their insurance carrier. They are aware that they may receive a bill and have provided verbal consent to proceed: Yes    I was at home while conducting this encounter.    Platform: [employer] F[employer]    HPI: Pt is a 43 y.o. female who presents for follow-up.     Knee weakness: Related to her MS. She has seen Ortho and they tried some injections which didn't help much. Currently she is not having the pain as much as feeling weak and unsteady. She has done PT for this in the past and would like to try that again. Because of her unsteady gait and dependence on a walker it is difficult for her to leave the house and she would benefit from home PT.    UTI: She reports her symptoms are improved since taking the last antibiotic, however she has still had some urinary incontinence. She believes this is related to not being able to clean herself 2/2 mobility issues from her MS. She would benefit from a Home Aide.       Past Medical History:   Diagnosis Date    Abnormal pap 3/2015; 4/2016; 5/16/17 2015 LGSIL +HPV; 2013 neg pap +HPV;4/2016 neg pap +HPV; 5/16/17 Neg pap +HPV    Anemia     Anxiety 2017    Asthma 2022    Depression     Eczema     H/O colposcopy with cervical biopsy 3/15 + 10/2013    neg    History of seizure 4/15/2016    One in 2008    Hypercholesterolemia 5/22/2018    Morbid obesity (HCC)     Multiple sclerosis (HCC)     Seizures (HCC) 2008       Family History   Problem Relation Age of Onset    Diabetes Mother     Hypertension Mother     Mult Sclerosis Mother     Cancer Mother         breast    Breast Cancer Mother     Breast Cancer Maternal Grandmother     No Known Problems Sister     Bipolar Disorder Father     Alcohol Abuse Father     Mental Illness

## 2024-08-19 DIAGNOSIS — D50.0 IRON DEFICIENCY ANEMIA DUE TO CHRONIC BLOOD LOSS: ICD-10-CM

## 2024-09-04 ENCOUNTER — TELEPHONE (OUTPATIENT)
Facility: CLINIC | Age: 44
End: 2024-09-04

## 2024-09-09 ENCOUNTER — TELEPHONE (OUTPATIENT)
Facility: CLINIC | Age: 44
End: 2024-09-09

## 2024-09-13 NOTE — ADDENDUM NOTE
Addended by: Addison Wong on: 7/9/2020 04:02 PM     Modules accepted: Orders Adequate: hears normal conversation without difficulty

## 2024-09-25 DIAGNOSIS — R30.0 DYSURIA: Primary | ICD-10-CM

## 2024-10-08 NOTE — PROGRESS NOTES
Pat Szymanski is a 44 y.o. female presents for a problem visit.    Chief Complaint   Patient presents with    Ultrasound     Patient's last menstrual period was 10/19/2024 (exact date).  Birth Control: OCP (estrogen/progesterone).  Last Pap: Normal, HPV Negative obtained 3 year(s) ago 12/2/21    The patient is here for US follow up d/t fibroids  US:    1. Have you been to the ER, urgent care clinic, or hospitalized since your last visit? No    2. Have you seen or consulted any other health care providers outside of the [facility] since your last visit? No    Examination chaperoned by Little Mancilla LPN.

## 2024-11-07 ENCOUNTER — OFFICE VISIT (OUTPATIENT)
Age: 44
End: 2024-11-07
Payer: MEDICARE

## 2024-11-07 VITALS — SYSTOLIC BLOOD PRESSURE: 140 MMHG | HEART RATE: 92 BPM | RESPIRATION RATE: 20 BRPM | DIASTOLIC BLOOD PRESSURE: 90 MMHG

## 2024-11-07 DIAGNOSIS — D21.9 FIBROIDS: Primary | ICD-10-CM

## 2024-11-07 DIAGNOSIS — N93.9 ABNORMAL UTERINE BLEEDING: ICD-10-CM

## 2024-11-07 PROCEDURE — G8427 DOCREV CUR MEDS BY ELIG CLIN: HCPCS | Performed by: OBSTETRICS & GYNECOLOGY

## 2024-11-07 PROCEDURE — 99213 OFFICE O/P EST LOW 20 MIN: CPT | Performed by: OBSTETRICS & GYNECOLOGY

## 2024-11-07 PROCEDURE — 1036F TOBACCO NON-USER: CPT | Performed by: OBSTETRICS & GYNECOLOGY

## 2024-11-07 PROCEDURE — G8484 FLU IMMUNIZE NO ADMIN: HCPCS | Performed by: OBSTETRICS & GYNECOLOGY

## 2024-11-07 PROCEDURE — G8417 CALC BMI ABV UP PARAM F/U: HCPCS | Performed by: OBSTETRICS & GYNECOLOGY

## 2024-11-07 NOTE — PROGRESS NOTES
OB/GYN Problem VIsit    HPI  Pat Szymanski is a No obstetric history on file.,  44 y.o. female who presents for a problem visit. Long hx of uterine fibroids. Hx complicated by MS - she is in a wheelchair and has mobility issues. Currently taking Slynd - prog one pill to manage bleeding. Frustrated because bleeding all month - explained this is normal the first few packs. She does not desire childbearing. Difficult for her to manage changing bed etc due to bleeding    Patient's last menstrual period was 10/19/2024 (exact date).  Birth Control: OCP (estrogen/progesterone).  Last Pap: Normal, HPV Negative obtained 3 year(s) ago 12/2/21         Past Medical History:   Diagnosis Date    Abnormal pap 3/2015; 4/2016; 5/16/17 2015 LGSIL +HPV; 2013 neg pap +HPV;4/2016 neg pap +HPV; 5/16/17 Neg pap +HPV    Anemia     Anxiety 2017    Asthma 2022    Depression     Eczema     H/O colposcopy with cervical biopsy 3/15 + 10/2013    neg    History of seizure 4/15/2016    One in 2008    Hypercholesterolemia 5/22/2018    Morbid obesity     Multiple sclerosis (HCC)     Seizures (HCC) 2008     Past Surgical History:   Procedure Laterality Date    BUNIONECTOMY      CHOLECYSTECTOMY  2017    Robotic-assisted laparoscopic cholecystectomy with firefly.    COLPOSCOPY  6/2016; 6/8/17    neg    GYN  unsure    LEEP    LEEP  6/08    neg    ORTHOPEDIC SURGERY Right     Right bunionectomy.    ORTHOPEDIC SURGERY Left 2012    Left bunionectomy.     Social History     Occupational History    Not on file   Tobacco Use    Smoking status: Never    Smokeless tobacco: Never   Vaping Use    Vaping status: Never Used   Substance and Sexual Activity    Alcohol use: Not Currently    Drug use: No    Sexual activity: Not Currently     Partners: Male     Birth control/protection: Abstinence     Comment: over 10 years     Family History   Problem Relation Age of Onset    Diabetes Mother     Hypertension Mother     Mult Sclerosis Mother     Cancer Mother

## 2024-11-12 ENCOUNTER — OFFICE VISIT (OUTPATIENT)
Age: 44
End: 2024-11-12
Payer: MEDICARE

## 2024-11-12 VITALS
RESPIRATION RATE: 18 BRPM | DIASTOLIC BLOOD PRESSURE: 80 MMHG | BODY MASS INDEX: 42.69 KG/M2 | HEIGHT: 67 IN | SYSTOLIC BLOOD PRESSURE: 126 MMHG | WEIGHT: 272 LBS | OXYGEN SATURATION: 100 % | HEART RATE: 91 BPM

## 2024-11-12 DIAGNOSIS — G35 MULTIPLE SCLEROSIS, SECONDARY PROGRESSIVE (HCC): Primary | ICD-10-CM

## 2024-11-12 PROCEDURE — G8427 DOCREV CUR MEDS BY ELIG CLIN: HCPCS | Performed by: PSYCHIATRY & NEUROLOGY

## 2024-11-12 PROCEDURE — 99204 OFFICE O/P NEW MOD 45 MIN: CPT | Performed by: PSYCHIATRY & NEUROLOGY

## 2024-11-12 PROCEDURE — G8417 CALC BMI ABV UP PARAM F/U: HCPCS | Performed by: PSYCHIATRY & NEUROLOGY

## 2024-11-12 PROCEDURE — 1036F TOBACCO NON-USER: CPT | Performed by: PSYCHIATRY & NEUROLOGY

## 2024-11-12 PROCEDURE — G8484 FLU IMMUNIZE NO ADMIN: HCPCS | Performed by: PSYCHIATRY & NEUROLOGY

## 2024-11-12 NOTE — PROGRESS NOTES
Room:  I have reviewed all needed documentation in preparation for visit. Verified patient by name and date of birth  Chief Complaint   Patient presents with    New Patient     giovana BARNHART from 1997, previously was treated at [facility]       Vitals:    11/12/24 1335   BP: 126/80   Pulse: 91   Resp: 18   SpO2: 100%   Weight: 123.4 kg (272 lb)   Height: 1.702 m (5' 7\")        Health Maintenance Due   Topic Date Due    Varicella vaccine (1 of 2 - 13+ 2-dose series) Never done    HIV screen  Never done    Hepatitis C screen  Never done    Hepatitis B vaccine (1 of 3 - 19+ 3-dose series) Never done    DTaP/Tdap/Td vaccine (1 - Tdap) Never done    Annual Wellness Visit (Medicare Advantage)  Never done    Flu vaccine (1) 08/01/2024    Breast cancer screen  09/14/2024        1. \"Have you been to the ER, urgent care clinic since your last visit?  Hospitalized since your last visit?\" No     2. \"Have you seen or consulted any other health care providers outside of the [facility] since your last visit?\" Yes [facility]    
about the possibility of her being seen at their [place] clinic that will allow her to be easily transported rather than the [facility] site.  As for the issue with hysterectomy.  There is no neurological contraindication for patient to undergo hysterectomy.  As to the need for inpatient rehab afterwards that should be determined with regards to the patient's level of function postop.  All questions and concerns were answered.    Visit lasted 45 minutes.  Greater than 50% was spent reviewing available medical records are summarized above, discussion about her condition, etiology, prognosis, the need to continue care at an MS center or MS specialist given the progressive nature of her multiple sclerosis and the treatment that she is currently on, no contraindication for hysterectomy, messaging her primary neurologist    This note was created using voice recognition software. Despite editing, there may be syntax errors.

## 2024-12-17 NOTE — PROGRESS NOTES
Pat Szymanski is a 44 y.o. female returns for an annual exam     Chief Complaint   Patient presents with    Annual Exam     Patient's last menstrual period was 10/16/2024 (within days).  Her periods are moderate in flow and very light to non existent due to contraceptive hormones.  She does not have dysmenorrhea.  Problems: fatigue, getting bloodwork with PCP  Birth Control: OCP (estrogen/progesterone).  Last Pap: Normal, HPV Negative obtained 3 year(s) ago 12/2/21  She does not have a history of THA 2, 3 or cervical cancer.   Last Mammogram: has not had a recent mammogram.   Last colonoscopy: a while ago per pt    1. Have you been to the ER, urgent care clinic, or hospitalized since your last visit? No    2. Have you seen or consulted any other health care providers outside of the [facility] since your last visit? No    Examination chaperoned by Little Mancilla LPN.

## 2024-12-18 SDOH — HEALTH STABILITY: PHYSICAL HEALTH: ON AVERAGE, HOW MANY DAYS PER WEEK DO YOU ENGAGE IN MODERATE TO STRENUOUS EXERCISE (LIKE A BRISK WALK)?: 0 DAYS

## 2024-12-19 ENCOUNTER — OFFICE VISIT (OUTPATIENT)
Facility: CLINIC | Age: 44
End: 2024-12-19

## 2024-12-19 VITALS
OXYGEN SATURATION: 98 % | TEMPERATURE: 98.7 F | RESPIRATION RATE: 16 BRPM | HEIGHT: 67 IN | HEART RATE: 85 BPM | DIASTOLIC BLOOD PRESSURE: 93 MMHG | WEIGHT: 284.2 LBS | BODY MASS INDEX: 44.61 KG/M2 | SYSTOLIC BLOOD PRESSURE: 134 MMHG

## 2024-12-19 DIAGNOSIS — Z00.00 MEDICARE ANNUAL WELLNESS VISIT, SUBSEQUENT: Primary | ICD-10-CM

## 2024-12-19 DIAGNOSIS — Z12.39 ENCOUNTER FOR SCREENING FOR MALIGNANT NEOPLASM OF BREAST, UNSPECIFIED SCREENING MODALITY: ICD-10-CM

## 2024-12-19 DIAGNOSIS — D25.9 UTERINE LEIOMYOMA, UNSPECIFIED LOCATION: ICD-10-CM

## 2024-12-19 DIAGNOSIS — F33.2 MAJOR DEPRESSIVE DISORDER, RECURRENT SEVERE WITHOUT PSYCHOTIC FEATURES (HCC): ICD-10-CM

## 2024-12-19 DIAGNOSIS — D50.0 IRON DEFICIENCY ANEMIA DUE TO CHRONIC BLOOD LOSS: ICD-10-CM

## 2024-12-19 DIAGNOSIS — G35 MS (MULTIPLE SCLEROSIS) (HCC): ICD-10-CM

## 2024-12-19 RX ORDER — FERROUS SULFATE 325(65) MG
1 TABLET ORAL
Qty: 90 TABLET | Refills: 1 | Status: SHIPPED | OUTPATIENT
Start: 2024-12-19

## 2024-12-19 ASSESSMENT — PATIENT HEALTH QUESTIONNAIRE - PHQ9
7. TROUBLE CONCENTRATING ON THINGS, SUCH AS READING THE NEWSPAPER OR WATCHING TELEVISION: NOT AT ALL
SUM OF ALL RESPONSES TO PHQ QUESTIONS 1-9: 2
6. FEELING BAD ABOUT YOURSELF - OR THAT YOU ARE A FAILURE OR HAVE LET YOURSELF OR YOUR FAMILY DOWN: MORE THAN HALF THE DAYS
10. IF YOU CHECKED OFF ANY PROBLEMS, HOW DIFFICULT HAVE THESE PROBLEMS MADE IT FOR YOU TO DO YOUR WORK, TAKE CARE OF THINGS AT HOME, OR GET ALONG WITH OTHER PEOPLE: SOMEWHAT DIFFICULT
SUM OF ALL RESPONSES TO PHQ QUESTIONS 1-9: 2
3. TROUBLE FALLING OR STAYING ASLEEP: NOT AT ALL
SUM OF ALL RESPONSES TO PHQ QUESTIONS 1-9: 2
1. LITTLE INTEREST OR PLEASURE IN DOING THINGS: NOT AT ALL
4. FEELING TIRED OR HAVING LITTLE ENERGY: NOT AT ALL
SUM OF ALL RESPONSES TO PHQ9 QUESTIONS 1 & 2: 0
5. POOR APPETITE OR OVEREATING: NOT AT ALL
SUM OF ALL RESPONSES TO PHQ QUESTIONS 1-9: 2
8. MOVING OR SPEAKING SO SLOWLY THAT OTHER PEOPLE COULD HAVE NOTICED. OR THE OPPOSITE, BEING SO FIGETY OR RESTLESS THAT YOU HAVE BEEN MOVING AROUND A LOT MORE THAN USUAL: NOT AT ALL
2. FEELING DOWN, DEPRESSED OR HOPELESS: NOT AT ALL
9. THOUGHTS THAT YOU WOULD BE BETTER OFF DEAD, OR OF HURTING YOURSELF: NOT AT ALL

## 2024-12-19 ASSESSMENT — LIFESTYLE VARIABLES
HOW OFTEN DO YOU HAVE A DRINK CONTAINING ALCOHOL: NEVER
HOW MANY STANDARD DRINKS CONTAINING ALCOHOL DO YOU HAVE ON A TYPICAL DAY: PATIENT DOES NOT DRINK

## 2024-12-19 NOTE — PATIENT INSTRUCTIONS

## 2024-12-19 NOTE — PROGRESS NOTES
Chief Complaint   Patient presents with    New Patient     Pat Szymanski is an 44 y.o. female who presents as a new patient to [facility] to establish care.       \"Have you been to the ER, urgent care clinic since your last visit?  Hospitalized since your last visit?\"    NO    “Have you seen or consulted any other health care providers outside of [facility] since your last visit?”    NO    Have you had a mammogram?”   NO has appt already     Date of last Mammogram: 9/14/2023             Click Here for Release of Records Request  
Medicare Annual Wellness Visit    Pat Szymanski is here for New Patient (Pat Szymanski is an 44 y.o. female who presents as a new patient to [facility] to establish care.  ) and Medicare AWV    Assessment & Plan      Assessment & Plan  Medicare annual wellness visit, subsequent    Living with her father, working on getting out more and staying active.          MS (multiple sclerosis) ([facility])    She tells me her MS is currently progressive. She believes that her medication and intermittent physical therapy (PT) have helped slow the progression. She is not currently in PT but has found walking, leg lifts, and knee bends to be beneficial. She uses a Rollator for mobility and has good arm strength. She is advised to continue staying as active as possible. Awaiting new neurologist in March.         Major depressive disorder, recurrent severe without psychotic features ([facility])    She attends a day program twice a week and is under the care of a therapist and psychiatrist. She reports that her current treatment is going well. She is encouraged to maintain social connections to help manage her symptoms.         Iron deficiency anemia due to chronic blood loss    She was previously taking two iron tablets daily but reduced to one due to bowel movement accidents. Her anemia may be related to her fibroids, and a hysterectomy might improve her condition. Her iron levels will be rechecked during her next visit. A prescription for iron tablets has been sent to [facility].    Orders:    ferrous sulfate (FEROSUL) 325 (65 Fe) MG tablet; Take 1 tablet by mouth daily (with breakfast)    Uterine leiomyoma, unspecified location    Her OB/GYN is concerned about the size of her fibroids and has recommended a hysterectomy, potentially scheduled for July. There are concerns about her mobility post-surgery. She is advised to plan ahead and stay as active as possible to aid in recovery. PT can be prescribed post-surgery to 
ug/mL     nitrofurantoin I Intermediate ug/mL     tetracycline S Sensitive ug/mL     tobramycin S Sensitive ug/mL     trimethoprim-sulfamethoxazole S Sensitive ug/mL     * Performed at:  [address] [place] [address] [address], [place], [place]  [id number] Director: Ankur Marcus MD, Phone:  [phone]   AMB POC URINALYSIS DIP STICK AUTO W/O MICRO   Result Value Ref Range    Color, Urine, POC Yellow     Clarity, Urine, POC Cloudy     Glucose, Urine, POC Negative     Bilirubin, Urine, POC Negative     Ketones, Urine, POC Negative     Specific Gravity, Urine, POC 1.010 1.001 - 1.035    Blood, Urine, POC Moderate     pH, Urine, POC 6.5 4.6 - 8.0    Protein, Urine, POC Negative     Urobilinogen, POC 0.2 mg/dL     Nitrite, Urine, POC Negative     Leukocyte Esterase, Urine, POC Small        The 10-year ASCVD risk score (Raissa DK, et al., 2019) is: 0.7%    Values used to calculate the score:      Age: 44 years      Sex: Female      Is Non- : Yes      Diabetic: No      Tobacco smoker: No      Systolic Blood Pressure: 134 mmHg      Is BP treated: No      HDL Cholesterol: 66 mg/dL      Total Cholesterol: 200 mg/dL    Immunization History   Administered Date(s) Administered    COVID-19, PFIZER Bivalent, DO NOT Dilute, (age 12y+), IM, 30 mcg/0.3 mL 10/07/2022    COVID-19, PFIZER PURPLE top, DILUTE for use, (age 12 y+), 30mcg/0.3mL 03/24/2021, 04/14/2021, 12/21/2021    COVID-19, PFIZER, (age 12y+), IM, 30mcg/0.3mL 10/16/2023    Influenza Virus Vaccine 02/15/2018, 10/08/2020, 12/21/2021, 10/07/2022, 10/16/2023, 09/20/2024    MMR, PRIORIX, M-M-R II, (age 12m+), SC, 0.5mL 05/14/1993        Assessment & Plan  MS (multiple sclerosis) (HCC)            Major depressive disorder, recurrent severe without psychotic features (HCC)            Iron deficiency anemia due to chronic blood loss              No follow-ups on file.     There are no Patient Instructions on file for this visit.    I have discussed

## 2024-12-19 NOTE — ASSESSMENT & PLAN NOTE
She tells me her MS is currently progressive. She believes that her medication and intermittent physical therapy (PT) have helped slow the progression. She is not currently in PT but has found walking, leg lifts, and knee bends to be beneficial. She uses a Rollator for mobility and has good arm strength. She is advised to continue staying as active as possible. Awaiting new neurologist in March.

## 2024-12-22 ENCOUNTER — PATIENT MESSAGE (OUTPATIENT)
Facility: CLINIC | Age: 44
End: 2024-12-22

## 2024-12-30 ENCOUNTER — OFFICE VISIT (OUTPATIENT)
Age: 44
End: 2024-12-30
Payer: MEDICARE

## 2024-12-30 VITALS — HEART RATE: 90 BPM | SYSTOLIC BLOOD PRESSURE: 144 MMHG | DIASTOLIC BLOOD PRESSURE: 96 MMHG

## 2024-12-30 DIAGNOSIS — D21.9 FIBROIDS: ICD-10-CM

## 2024-12-30 DIAGNOSIS — Z12.4 CERVICAL CANCER SCREENING: ICD-10-CM

## 2024-12-30 DIAGNOSIS — Z01.419 WELL WOMAN EXAM: Primary | ICD-10-CM

## 2024-12-30 PROCEDURE — 99459 PELVIC EXAMINATION: CPT | Performed by: OBSTETRICS & GYNECOLOGY

## 2024-12-30 PROCEDURE — 99396 PREV VISIT EST AGE 40-64: CPT | Performed by: OBSTETRICS & GYNECOLOGY

## 2024-12-30 PROCEDURE — G8484 FLU IMMUNIZE NO ADMIN: HCPCS | Performed by: OBSTETRICS & GYNECOLOGY

## 2024-12-30 RX ORDER — DROSPIRENONE 4 MG/1
1 TABLET, FILM COATED ORAL DAILY
Qty: 84 TABLET | Refills: 4 | Status: SHIPPED | OUTPATIENT
Start: 2024-12-30

## 2024-12-30 NOTE — PROGRESS NOTES
Pat Szymanski is a No obstetric history on file.,  44 y.o. female Black /  whose LMP was on  who presents for her annual checkup. She is having problems - fatigue - seeing PCP.    Menstrual status:    Her periods are light in flow, regular    She denies dysmenorrhea.    Contraception:    The current method of family planning is abstinence.    Sexual history:    She  reports that she is not currently sexually active and has had partner(s) who are male. She reports using the following method of birth control/protection: Abstinence.        Pap and Mammogram History:    Birth Control: OCP (estrogen/progesterone).  Last Pap: Normal, HPV Negative obtained 3 year(s) ago 12/2/21  She does not have a history of THA 2, 3 or cervical cancer.   Last Mammogram: has not had a recent mammogram.   Last colonoscopy: a while ago per pt         Breast Cancer History    She has a family history of breast cancer.    Family History   Problem Relation Age of Onset    Diabetes Mother     Hypertension Mother     Mult Sclerosis Mother     Cancer Mother         breast    Breast Cancer Mother     Breast Cancer Maternal Grandmother     No Known Problems Sister     Bipolar Disorder Father     Alcohol Abuse Father     Mental Illness Father     Other Father        Past Medical History:   Diagnosis Date    Abnormal pap 3/2015; 4/2016; 5/16/17 2015 LGSIL +HPV; 2013 neg pap +HPV;4/2016 neg pap +HPV; 5/16/17 Neg pap +HPV    Anemia     Anxiety 2017    Asthma 2022    Depression     Eczema     H/O colposcopy with cervical biopsy 3/15 + 10/2013    neg    History of seizure 4/15/2016    One in 2008    Hypercholesterolemia 5/22/2018    Morbid obesity     Multiple sclerosis (HCC)     Seizures (HCC) 2008     Past Surgical History:   Procedure Laterality Date    BUNIONECTOMY      CHOLECYSTECTOMY  2017    Robotic-assisted laparoscopic cholecystectomy with firefly.    COLPOSCOPY  6/2016; 6/8/17    neg    GYN  unsure    LEEP    LEEP  6/08

## 2024-12-31 ENCOUNTER — PATIENT MESSAGE (OUTPATIENT)
Facility: CLINIC | Age: 44
End: 2024-12-31

## 2024-12-31 DIAGNOSIS — G35 MS (MULTIPLE SCLEROSIS) (HCC): ICD-10-CM

## 2024-12-31 DIAGNOSIS — Z11.59 NEED FOR HEPATITIS C SCREENING TEST: ICD-10-CM

## 2024-12-31 DIAGNOSIS — E78.00 HYPERCHOLESTEROLEMIA: ICD-10-CM

## 2024-12-31 DIAGNOSIS — R53.83 OTHER FATIGUE: Primary | ICD-10-CM

## 2024-12-31 DIAGNOSIS — R73.01 IMPAIRED FASTING BLOOD SUGAR: ICD-10-CM

## 2024-12-31 DIAGNOSIS — D50.0 IRON DEFICIENCY ANEMIA DUE TO CHRONIC BLOOD LOSS: ICD-10-CM

## 2024-12-31 DIAGNOSIS — E66.01 OBESITY, CLASS III, BMI 40-49.9 (MORBID OBESITY): ICD-10-CM

## 2024-12-31 DIAGNOSIS — F33.2 MAJOR DEPRESSIVE DISORDER, RECURRENT SEVERE WITHOUT PSYCHOTIC FEATURES (HCC): ICD-10-CM

## 2024-12-31 DIAGNOSIS — Z11.4 SCREENING FOR HIV WITHOUT PRESENCE OF RISK FACTORS: ICD-10-CM

## 2024-12-31 DIAGNOSIS — D50.0 IRON DEFICIENCY ANEMIA DUE TO CHRONIC BLOOD LOSS: Primary | ICD-10-CM

## 2025-01-03 ENCOUNTER — PREP FOR PROCEDURE (OUTPATIENT)
Facility: HOSPITAL | Age: 45
End: 2025-01-03

## 2025-01-03 PROBLEM — N92.0 MENORRHAGIA: Status: ACTIVE | Noted: 2025-01-03

## 2025-01-03 PROBLEM — D25.9 FIBROID, UTERINE: Status: ACTIVE | Noted: 2025-01-03

## 2025-01-03 LAB
CYTOLOGIST CVX/VAG CYTO: NORMAL
CYTOLOGY CVX/VAG DOC CYTO: NORMAL
CYTOLOGY CVX/VAG DOC THIN PREP: NORMAL
DX ICD CODE: NORMAL
HPV GENOTYPE REFLEX: NORMAL
HPV I/H RISK 4 DNA CVX QL PROBE+SIG AMP: NEGATIVE
Lab: NORMAL
OTHER STN SPEC: NORMAL
STAT OF ADQ CVX/VAG CYTO-IMP: NORMAL

## 2025-01-14 ENCOUNTER — PATIENT MESSAGE (OUTPATIENT)
Facility: CLINIC | Age: 45
End: 2025-01-14

## 2025-02-03 ENCOUNTER — PATIENT MESSAGE (OUTPATIENT)
Facility: CLINIC | Age: 45
End: 2025-02-03

## 2025-02-05 ENCOUNTER — PATIENT MESSAGE (OUTPATIENT)
Facility: CLINIC | Age: 45
End: 2025-02-05

## 2025-02-28 ENCOUNTER — PREP FOR PROCEDURE (OUTPATIENT)
Facility: HOSPITAL | Age: 45
End: 2025-02-28

## 2025-02-28 DIAGNOSIS — D25.9 UTERINE LEIOMYOMA, UNSPECIFIED LOCATION: Primary | ICD-10-CM

## 2025-02-28 DIAGNOSIS — N92.0 MENORRHAGIA WITH REGULAR CYCLE: ICD-10-CM

## 2025-03-06 ENCOUNTER — PATIENT MESSAGE (OUTPATIENT)
Facility: CLINIC | Age: 45
End: 2025-03-06

## 2025-03-06 DIAGNOSIS — R30.0 DYSURIA: Primary | ICD-10-CM

## 2025-03-12 ENCOUNTER — TELEPHONE (OUTPATIENT)
Facility: CLINIC | Age: 45
End: 2025-03-12

## 2025-03-12 NOTE — TELEPHONE ENCOUNTER
Lab orders placed. 01/01/2025.    Please call pt, and ask that female have labs drawn prior to scheduled appt, so results can be discussed at that time.

## 2025-03-13 ENCOUNTER — HOSPITAL ENCOUNTER (OUTPATIENT)
Facility: HOSPITAL | Age: 45
Discharge: HOME | End: 2025-03-16
Payer: MEDICARE

## 2025-03-13 VITALS
HEIGHT: 67 IN | WEIGHT: 277 LBS | RESPIRATION RATE: 20 BRPM | HEART RATE: 84 BPM | TEMPERATURE: 97.3 F | OXYGEN SATURATION: 99 % | BODY MASS INDEX: 43.47 KG/M2 | DIASTOLIC BLOOD PRESSURE: 85 MMHG | SYSTOLIC BLOOD PRESSURE: 132 MMHG

## 2025-03-13 DIAGNOSIS — N92.0 MENORRHAGIA WITH REGULAR CYCLE: ICD-10-CM

## 2025-03-13 DIAGNOSIS — D25.9 UTERINE LEIOMYOMA, UNSPECIFIED LOCATION: ICD-10-CM

## 2025-03-13 LAB
ABO + RH BLD: NORMAL
ALBUMIN SERPL-MCNC: 4 G/DL (ref 3.5–5)
ALBUMIN/GLOB SERPL: 1.2 (ref 1.1–2.2)
ALP SERPL-CCNC: 111 U/L (ref 45–117)
ALT SERPL-CCNC: 33 U/L (ref 12–78)
ANION GAP SERPL CALC-SCNC: 7 MMOL/L (ref 2–12)
AST SERPL-CCNC: 19 U/L (ref 15–37)
BASOPHILS # BLD: 0.04 K/UL (ref 0–0.1)
BASOPHILS NFR BLD: 0.7 % (ref 0–1)
BILIRUB SERPL-MCNC: 0.4 MG/DL (ref 0.2–1)
BLOOD GROUP ANTIBODIES SERPL: NORMAL
BUN SERPL-MCNC: 9 MG/DL (ref 6–20)
BUN/CREAT SERPL: 11 (ref 12–20)
CALCIUM SERPL-MCNC: 10.1 MG/DL (ref 8.5–10.1)
CHLORIDE SERPL-SCNC: 104 MMOL/L (ref 97–108)
CO2 SERPL-SCNC: 26 MMOL/L (ref 21–32)
CREAT SERPL-MCNC: 0.83 MG/DL (ref 0.55–1.02)
DIFFERENTIAL METHOD BLD: ABNORMAL
EOSINOPHIL # BLD: 0.15 K/UL (ref 0–0.4)
EOSINOPHIL NFR BLD: 2.5 % (ref 0–7)
ERYTHROCYTE [DISTWIDTH] IN BLOOD BY AUTOMATED COUNT: 12.3 % (ref 11.5–14.5)
GLOBULIN SER CALC-MCNC: 3.3 G/DL (ref 2–4)
GLUCOSE SERPL-MCNC: 90 MG/DL (ref 65–100)
HCG SERPL QL: NEGATIVE
HCT VFR BLD AUTO: 45.7 % (ref 35–47)
HGB BLD-MCNC: 15.5 G/DL (ref 11.5–16)
IMM GRANULOCYTES # BLD AUTO: 0.01 K/UL (ref 0–0.04)
IMM GRANULOCYTES NFR BLD AUTO: 0.2 % (ref 0–0.5)
LYMPHOCYTES # BLD: 0.57 K/UL (ref 0.8–3.5)
LYMPHOCYTES NFR BLD: 9.5 % (ref 12–49)
MCH RBC QN AUTO: 29.8 PG (ref 26–34)
MCHC RBC AUTO-ENTMCNC: 33.9 G/DL (ref 30–36.5)
MCV RBC AUTO: 87.7 FL (ref 80–99)
MONOCYTES # BLD: 0.64 K/UL (ref 0–1)
MONOCYTES NFR BLD: 10.6 % (ref 5–13)
NEUTS SEG # BLD: 4.59 K/UL (ref 1.8–8)
NEUTS SEG NFR BLD: 76.5 % (ref 32–75)
NRBC # BLD: 0 K/UL (ref 0–0.01)
NRBC BLD-RTO: 0 PER 100 WBC
PLATELET # BLD AUTO: 381 K/UL (ref 150–400)
PMV BLD AUTO: 9.6 FL (ref 8.9–12.9)
POTASSIUM SERPL-SCNC: 4.4 MMOL/L (ref 3.5–5.1)
PROT SERPL-MCNC: 7.3 G/DL (ref 6.4–8.2)
RBC # BLD AUTO: 5.21 M/UL (ref 3.8–5.2)
RBC MORPH BLD: ABNORMAL
SODIUM SERPL-SCNC: 137 MMOL/L (ref 136–145)
SPECIMEN EXP DATE BLD: NORMAL
WBC # BLD AUTO: 6 K/UL (ref 3.6–11)

## 2025-03-13 PROCEDURE — 84703 CHORIONIC GONADOTROPIN ASSAY: CPT

## 2025-03-13 PROCEDURE — 36415 COLL VENOUS BLD VENIPUNCTURE: CPT

## 2025-03-13 PROCEDURE — 86850 RBC ANTIBODY SCREEN: CPT

## 2025-03-13 PROCEDURE — 86900 BLOOD TYPING SEROLOGIC ABO: CPT

## 2025-03-13 PROCEDURE — 85025 COMPLETE CBC W/AUTO DIFF WBC: CPT

## 2025-03-13 PROCEDURE — 86901 BLOOD TYPING SEROLOGIC RH(D): CPT

## 2025-03-13 PROCEDURE — 93005 ELECTROCARDIOGRAM TRACING: CPT | Performed by: OBSTETRICS & GYNECOLOGY

## 2025-03-13 PROCEDURE — 80053 COMPREHEN METABOLIC PANEL: CPT

## 2025-03-13 RX ORDER — BUSPIRONE HYDROCHLORIDE 5 MG/1
5 TABLET ORAL 2 TIMES DAILY
COMMUNITY

## 2025-03-13 NOTE — TELEPHONE ENCOUNTER
Jonas Rivers, SANTY  You16 minutes ago (1:35 PM)     AC   Unable to reach patient at number on file,left message on patient's personal VM to please complete labs prior to appointment.

## 2025-03-13 NOTE — PERIOP NOTE
[facility]                   [address] [place] [address]   MAIN OR                                  [phone]   MAIN PRE OP                          [phone]                                                              AMBULATORY PRE OP          [phone]  PRE-ADMISSION TESTING    [phone]   Surgery Date:  Thursday, 3/27       Is surgery arrival time given by surgeon?   NO  If “NO”, [facility] staff will call you between 3 and 7pm the day before your surgery with your arrival time. (If your surgery is on a Monday, we will call you the Friday before.)    Call [phone] after 7pm Monday-Friday if you did not receive this call.    INSTRUCTIONS BEFORE YOUR SURGERY   When You  Arrive Arrive at the 2nd Floor Admitting Desk on the day of your surgery.  Have your insurance card, photo ID,living will/advanced directive/POA (if applicable),  and any copayment (if needed)   Food   and   Drink NO food or drink after midnight the night before surgery    This means NO gum, mints, coffee, juice, food, etc.     You may drink WATER ONLY up until two (2) hours before your surgery. DO NOT ADD ANYTHING TO THIS WATER to avoid postponement of your surgery.    No alcohol (beer, wine, liquor) or marijuana 24 hours before and after surgery   Medications to   TAKE   Morning of Surgery MEDICATIONS TO TAKE THE MORNING OF SURGERY WITH A SIP OF WATER:     ALBUTEROL IF NEEDED AND BRING WITH YOU  BUSPAR  TRINTELLIX     Medications  To  STOP      7 days before surgery Non-Steroidal anti-inflammatory Drugs (NSAID's): for example, Ibuprofen (Advil, Motrin), Naproxen (Aleve)  Aspirin, if taking for pain   Herbal supplements, vitamins, and fish oil  GLP-1 medications must be stopped at LEAST seven (7) days prior to surgery to prevent postponement of your surgery.   Other:  (Pain medications not listed above, including Tylenol may be taken)   Blood  Thinners If you take  Aspirin, Plavix,  patient was contacted by  IN-PERSON.  The patient verbalizes understanding of all instructions and DOES NOT need reinforcement.

## 2025-03-14 LAB
EKG ATRIAL RATE: 79 BPM
EKG DIAGNOSIS: NORMAL
EKG P AXIS: 26 DEGREES
EKG P-R INTERVAL: 142 MS
EKG Q-T INTERVAL: 384 MS
EKG QRS DURATION: 82 MS
EKG QTC CALCULATION (BAZETT): 440 MS
EKG R AXIS: 15 DEGREES
EKG T AXIS: 11 DEGREES
EKG VENTRICULAR RATE: 79 BPM

## 2025-03-14 PROCEDURE — 93010 ELECTROCARDIOGRAM REPORT: CPT | Performed by: SPECIALIST

## 2025-03-17 SDOH — HEALTH STABILITY: PHYSICAL HEALTH
ON AVERAGE, HOW MANY DAYS PER WEEK DO YOU ENGAGE IN MODERATE TO STRENUOUS EXERCISE (LIKE A BRISK WALK)?: PATIENT DECLINED

## 2025-03-17 SDOH — ECONOMIC STABILITY: TRANSPORTATION INSECURITY
IN THE PAST 12 MONTHS, HAS THE LACK OF TRANSPORTATION KEPT YOU FROM MEDICAL APPOINTMENTS OR FROM GETTING MEDICATIONS?: PATIENT DECLINED

## 2025-03-17 SDOH — ECONOMIC STABILITY: INCOME INSECURITY: IN THE LAST 12 MONTHS, WAS THERE A TIME WHEN YOU WERE NOT ABLE TO PAY THE MORTGAGE OR RENT ON TIME?: PATIENT DECLINED

## 2025-03-17 SDOH — ECONOMIC STABILITY: FOOD INSECURITY: WITHIN THE PAST 12 MONTHS, THE FOOD YOU BOUGHT JUST DIDN'T LAST AND YOU DIDN'T HAVE MONEY TO GET MORE.: PATIENT DECLINED

## 2025-03-17 SDOH — ECONOMIC STABILITY: FOOD INSECURITY: WITHIN THE PAST 12 MONTHS, YOU WORRIED THAT YOUR FOOD WOULD RUN OUT BEFORE YOU GOT MONEY TO BUY MORE.: PATIENT DECLINED

## 2025-03-17 SDOH — ECONOMIC STABILITY: TRANSPORTATION INSECURITY
IN THE PAST 12 MONTHS, HAS LACK OF TRANSPORTATION KEPT YOU FROM MEETINGS, WORK, OR FROM GETTING THINGS NEEDED FOR DAILY LIVING?: PATIENT DECLINED

## 2025-03-17 ASSESSMENT — PATIENT HEALTH QUESTIONNAIRE - PHQ9
SUM OF ALL RESPONSES TO PHQ QUESTIONS 1-9: 1
1. LITTLE INTEREST OR PLEASURE IN DOING THINGS: SEVERAL DAYS
6. FEELING BAD ABOUT YOURSELF - OR THAT YOU ARE A FAILURE OR HAVE LET YOURSELF OR YOUR FAMILY DOWN: NOT AT ALL
SUM OF ALL RESPONSES TO PHQ QUESTIONS 1-9: 1
9. THOUGHTS THAT YOU WOULD BE BETTER OFF DEAD, OR OF HURTING YOURSELF: NOT AT ALL
SUM OF ALL RESPONSES TO PHQ QUESTIONS 1-9: 1
7. TROUBLE CONCENTRATING ON THINGS, SUCH AS READING THE NEWSPAPER OR WATCHING TELEVISION: NOT AT ALL
3. TROUBLE FALLING OR STAYING ASLEEP: NOT AT ALL
SUM OF ALL RESPONSES TO PHQ QUESTIONS 1-9: 1
10. IF YOU CHECKED OFF ANY PROBLEMS, HOW DIFFICULT HAVE THESE PROBLEMS MADE IT FOR YOU TO DO YOUR WORK, TAKE CARE OF THINGS AT HOME, OR GET ALONG WITH OTHER PEOPLE: NOT DIFFICULT AT ALL
4. FEELING TIRED OR HAVING LITTLE ENERGY: NOT AT ALL
5. POOR APPETITE OR OVEREATING: NOT AT ALL
8. MOVING OR SPEAKING SO SLOWLY THAT OTHER PEOPLE COULD HAVE NOTICED. OR THE OPPOSITE, BEING SO FIGETY OR RESTLESS THAT YOU HAVE BEEN MOVING AROUND A LOT MORE THAN USUAL: NOT AT ALL
2. FEELING DOWN, DEPRESSED OR HOPELESS: NOT AT ALL

## 2025-03-17 ASSESSMENT — LIFESTYLE VARIABLES
HOW OFTEN DO YOU HAVE SIX OR MORE DRINKS ON ONE OCCASION: 98
HOW OFTEN DO YOU HAVE A DRINK CONTAINING ALCOHOL: 98
HOW MANY STANDARD DRINKS CONTAINING ALCOHOL DO YOU HAVE ON A TYPICAL DAY: 98
HOW OFTEN DO YOU HAVE A DRINK CONTAINING ALCOHOL: PATIENT DECLINED
HOW MANY STANDARD DRINKS CONTAINING ALCOHOL DO YOU HAVE ON A TYPICAL DAY: PATIENT DECLINED

## 2025-03-20 ENCOUNTER — OFFICE VISIT (OUTPATIENT)
Facility: CLINIC | Age: 45
End: 2025-03-20
Payer: MEDICARE

## 2025-03-20 VITALS
OXYGEN SATURATION: 97 % | BODY MASS INDEX: 43.66 KG/M2 | SYSTOLIC BLOOD PRESSURE: 126 MMHG | WEIGHT: 278.2 LBS | DIASTOLIC BLOOD PRESSURE: 77 MMHG | TEMPERATURE: 97.7 F | HEART RATE: 97 BPM | RESPIRATION RATE: 16 BRPM | HEIGHT: 67 IN

## 2025-03-20 DIAGNOSIS — R26.81 GAIT INSTABILITY: ICD-10-CM

## 2025-03-20 DIAGNOSIS — Z12.39 ENCOUNTER FOR SCREENING FOR MALIGNANT NEOPLASM OF BREAST, UNSPECIFIED SCREENING MODALITY: ICD-10-CM

## 2025-03-20 DIAGNOSIS — G35 MS (MULTIPLE SCLEROSIS) (HCC): Primary | ICD-10-CM

## 2025-03-20 DIAGNOSIS — R53.83 OTHER FATIGUE: ICD-10-CM

## 2025-03-20 DIAGNOSIS — D25.9 UTERINE LEIOMYOMA, UNSPECIFIED LOCATION: ICD-10-CM

## 2025-03-20 DIAGNOSIS — E66.01 OBESITY, CLASS III, BMI 40-49.9 (MORBID OBESITY): ICD-10-CM

## 2025-03-20 DIAGNOSIS — F33.2 MAJOR DEPRESSIVE DISORDER, RECURRENT SEVERE WITHOUT PSYCHOTIC FEATURES (HCC): ICD-10-CM

## 2025-03-20 PROCEDURE — G8417 CALC BMI ABV UP PARAM F/U: HCPCS | Performed by: FAMILY MEDICINE

## 2025-03-20 PROCEDURE — 1036F TOBACCO NON-USER: CPT | Performed by: FAMILY MEDICINE

## 2025-03-20 PROCEDURE — G8427 DOCREV CUR MEDS BY ELIG CLIN: HCPCS | Performed by: FAMILY MEDICINE

## 2025-03-20 PROCEDURE — 99213 OFFICE O/P EST LOW 20 MIN: CPT | Performed by: FAMILY MEDICINE

## 2025-03-20 NOTE — PROGRESS NOTES
Chief Complaint   Patient presents with    Medicare AWV     Pat Szymanski is a 44 y.o. female who presents for his/her AWV. Their most recent medicare wellness examination was done over 1 yr ago.      \"Have you been to the ER, urgent care clinic since your last visit?  Hospitalized since your last visit?\"    NO    “Have you seen or consulted any other health care providers outside of [facility] since your last visit?”    NO    Have you had a mammogram?”   NO    Date of last Mammogram: 9/14/2023             Click Here for Release of Records Request  
 Med Hx  Surg Hx  Fam Hx  Sexual   Hx                  The patient (or guardian, if applicable) and other individuals in attendance with the patient were advised that Artificial Intelligence will be utilized during this visit to record and process the conversation to generate a clinical note. The patient (or guardian, if applicable) and other individuals in attendance at the appointment consented to the use of AI, including the recording.

## 2025-03-20 NOTE — ASSESSMENT & PLAN NOTE
Blood pressure normal. EKG: stable heart rate and rhythm.  - Referral for physical therapy at [facility] provided.

## 2025-03-20 NOTE — ASSESSMENT & PLAN NOTE
MS might be exacerbated by upcoming surgery.  - Continue current medication regimen as per neurologist's recommendation.  - If fatigue returns, consider contacting neurologist for potential fatigue medication.  - Start fiber supplements and vitamin D as advised.  Orders:    External Referral To Physical Therapy

## 2025-03-24 ENCOUNTER — PATIENT MESSAGE (OUTPATIENT)
Facility: CLINIC | Age: 45
End: 2025-03-24

## 2025-03-24 ENCOUNTER — ANESTHESIA EVENT (OUTPATIENT)
Facility: HOSPITAL | Age: 45
End: 2025-03-24
Payer: MEDICARE

## 2025-03-26 NOTE — H&P
Gynecology History and Physical    Name: Pat Szymanski MRN: [id number] SSN: [id number]    YOB: 1980  Age: 44 y.o.  Sex: female       Subjective:      Chief complaint:  Fibroids    Pat is a 44 y.o.  female with a history of fibroids and menorrhagia. Previous workup included Ultrasound which revealed fibroid(s). Previous treatment measures included oral contraceptives and progesterone only contraceptive. She is admitted for Procedure(s) (LRB):  TOTAL ABDOMINAL HYSTERECTOMY, BILATERAL SALPINGECTOMY (N/A).        OB History          0    Para        Term                AB        Living   0         SAB        IAB        Ectopic        Molar        Multiple        Live Births                  Past Medical History:   Diagnosis Date    Abnormal pap 3/2015; 2016; 17 LGSIL +HPV;  neg pap +HPV;2016 neg pap +HPV; 17 Neg pap +HPV    Anemia     Anxiety     Asthma     Depression     Eczema     H/O colposcopy with cervical biopsy 3/15 + 10/2013    neg    History of seizure 4/15/2016    One in    (from MS lesion. As of 3/13/25, no further sz, no sz meds after 10 yrs)    Hypercholesterolemia 2018    Morbid obesity     Multiple sclerosis (HCC)     Papanicolaou smear for cervical cancer screening 2024    Normal     Past Surgical History:   Procedure Laterality Date    CHOLECYSTECTOMY  2017    Robotic-assisted laparoscopic cholecystectomy with firefly.    COLPOSCOPY  2016; 17    neg    LEEP      neg    ORTHOPEDIC SURGERY Right     Right bunionectomy.    ORTHOPEDIC SURGERY Left 2012    Left bunionectomy.    WISDOM TOOTH EXTRACTION Bilateral      Social History     Occupational History    Not on file   Tobacco Use    Smoking status: Never    Smokeless tobacco: Never   Vaping Use    Vaping status: Never Used   Substance and Sexual Activity    Alcohol use: Not Currently    Drug use: No    Sexual activity: Not Currently     Partners:

## 2025-03-27 ENCOUNTER — HOSPITAL ENCOUNTER (INPATIENT)
Facility: HOSPITAL | Age: 45
LOS: 6 days | Discharge: NURSING, REHAB OR LONG-TERM CARE | DRG: 742 | End: 2025-04-02
Attending: OBSTETRICS & GYNECOLOGY | Admitting: OBSTETRICS & GYNECOLOGY
Payer: MEDICARE

## 2025-03-27 ENCOUNTER — ANESTHESIA (OUTPATIENT)
Facility: HOSPITAL | Age: 45
End: 2025-03-27
Payer: MEDICARE

## 2025-03-27 DIAGNOSIS — D25.9 UTERINE LEIOMYOMA, UNSPECIFIED LOCATION: ICD-10-CM

## 2025-03-27 DIAGNOSIS — N92.0 MENORRHAGIA WITH REGULAR CYCLE: ICD-10-CM

## 2025-03-27 PROBLEM — D21.9 FIBROIDS: Status: ACTIVE | Noted: 2025-03-27

## 2025-03-27 LAB
ABO + RH BLD: NORMAL
ABO + RH BLD: NORMAL
BLOOD GROUP ANTIBODIES SERPL: NORMAL
BLOOD GROUP ANTIBODIES SERPL: NORMAL
HCG SERPL QL: NEGATIVE
SPECIMEN EXP DATE BLD: NORMAL
SPECIMEN EXP DATE BLD: NORMAL

## 2025-03-27 PROCEDURE — 0UT90ZZ RESECTION OF UTERUS, OPEN APPROACH: ICD-10-PCS | Performed by: OBSTETRICS & GYNECOLOGY

## 2025-03-27 PROCEDURE — 3700000000 HC ANESTHESIA ATTENDED CARE: Performed by: OBSTETRICS & GYNECOLOGY

## 2025-03-27 PROCEDURE — 3700000001 HC ADD 15 MINUTES (ANESTHESIA): Performed by: OBSTETRICS & GYNECOLOGY

## 2025-03-27 PROCEDURE — 0UT70ZZ RESECTION OF BILATERAL FALLOPIAN TUBES, OPEN APPROACH: ICD-10-PCS | Performed by: OBSTETRICS & GYNECOLOGY

## 2025-03-27 PROCEDURE — 6360000002 HC RX W HCPCS: Performed by: OBSTETRICS & GYNECOLOGY

## 2025-03-27 PROCEDURE — 2500000003 HC RX 250 WO HCPCS: Performed by: OBSTETRICS & GYNECOLOGY

## 2025-03-27 PROCEDURE — 2580000003 HC RX 258: Performed by: NURSE ANESTHETIST, CERTIFIED REGISTERED

## 2025-03-27 PROCEDURE — 84703 CHORIONIC GONADOTROPIN ASSAY: CPT

## 2025-03-27 PROCEDURE — 2580000003 HC RX 258: Performed by: OBSTETRICS & GYNECOLOGY

## 2025-03-27 PROCEDURE — 2709999900 HC NON-CHARGEABLE SUPPLY: Performed by: OBSTETRICS & GYNECOLOGY

## 2025-03-27 PROCEDURE — 86900 BLOOD TYPING SEROLOGIC ABO: CPT

## 2025-03-27 PROCEDURE — 94761 N-INVAS EAR/PLS OXIMETRY MLT: CPT

## 2025-03-27 PROCEDURE — 6360000002 HC RX W HCPCS: Performed by: ANESTHESIOLOGY

## 2025-03-27 PROCEDURE — 7100000000 HC PACU RECOVERY - FIRST 15 MIN: Performed by: OBSTETRICS & GYNECOLOGY

## 2025-03-27 PROCEDURE — 2500000003 HC RX 250 WO HCPCS: Performed by: NURSE ANESTHETIST, CERTIFIED REGISTERED

## 2025-03-27 PROCEDURE — 2720000010 HC SURG SUPPLY STERILE: Performed by: OBSTETRICS & GYNECOLOGY

## 2025-03-27 PROCEDURE — 86901 BLOOD TYPING SEROLOGIC RH(D): CPT

## 2025-03-27 PROCEDURE — 58150 TOTAL HYSTERECTOMY: CPT | Performed by: STUDENT IN AN ORGANIZED HEALTH CARE EDUCATION/TRAINING PROGRAM

## 2025-03-27 PROCEDURE — 88307 TISSUE EXAM BY PATHOLOGIST: CPT

## 2025-03-27 PROCEDURE — 64488 TAP BLOCK BI INJECTION: CPT | Performed by: ANESTHESIOLOGY

## 2025-03-27 PROCEDURE — 58150 TOTAL HYSTERECTOMY: CPT | Performed by: OBSTETRICS & GYNECOLOGY

## 2025-03-27 PROCEDURE — P9045 ALBUMIN (HUMAN), 5%, 250 ML: HCPCS | Performed by: NURSE ANESTHETIST, CERTIFIED REGISTERED

## 2025-03-27 PROCEDURE — 6370000000 HC RX 637 (ALT 250 FOR IP): Performed by: OBSTETRICS & GYNECOLOGY

## 2025-03-27 PROCEDURE — 7100000001 HC PACU RECOVERY - ADDTL 15 MIN: Performed by: OBSTETRICS & GYNECOLOGY

## 2025-03-27 PROCEDURE — 86850 RBC ANTIBODY SCREEN: CPT

## 2025-03-27 PROCEDURE — 1100000000 HC RM PRIVATE

## 2025-03-27 PROCEDURE — 6360000002 HC RX W HCPCS: Performed by: NURSE ANESTHETIST, CERTIFIED REGISTERED

## 2025-03-27 PROCEDURE — 3600000003 HC SURGERY LEVEL 3 BASE: Performed by: OBSTETRICS & GYNECOLOGY

## 2025-03-27 PROCEDURE — 3600000013 HC SURGERY LEVEL 3 ADDTL 15MIN: Performed by: OBSTETRICS & GYNECOLOGY

## 2025-03-27 PROCEDURE — 36415 COLL VENOUS BLD VENIPUNCTURE: CPT

## 2025-03-27 RX ORDER — ACETAMINOPHEN 500 MG
1000 TABLET ORAL EVERY 8 HOURS PRN
Status: DISCONTINUED | OUTPATIENT
Start: 2025-03-27 | End: 2025-04-02 | Stop reason: HOSPADM

## 2025-03-27 RX ORDER — PROCHLORPERAZINE EDISYLATE 5 MG/ML
10 INJECTION INTRAMUSCULAR; INTRAVENOUS EVERY 6 HOURS PRN
Status: DISCONTINUED | OUTPATIENT
Start: 2025-03-27 | End: 2025-04-02 | Stop reason: HOSPADM

## 2025-03-27 RX ORDER — BUSPIRONE HYDROCHLORIDE 5 MG/1
5 TABLET ORAL 2 TIMES DAILY
Status: DISCONTINUED | OUTPATIENT
Start: 2025-03-27 | End: 2025-04-02 | Stop reason: HOSPADM

## 2025-03-27 RX ORDER — MIDAZOLAM HYDROCHLORIDE 1 MG/ML
INJECTION, SOLUTION INTRAMUSCULAR; INTRAVENOUS
Status: DISCONTINUED | OUTPATIENT
Start: 2025-03-27 | End: 2025-03-27 | Stop reason: SDUPTHER

## 2025-03-27 RX ORDER — PROPOFOL 10 MG/ML
INJECTION, EMULSION INTRAVENOUS
Status: DISCONTINUED | OUTPATIENT
Start: 2025-03-27 | End: 2025-03-27 | Stop reason: SDUPTHER

## 2025-03-27 RX ORDER — MAGNESIUM HYDROXIDE/ALUMINUM HYDROXICE/SIMETHICONE 120; 1200; 1200 MG/30ML; MG/30ML; MG/30ML
30 SUSPENSION ORAL EVERY 6 HOURS PRN
Status: DISCONTINUED | OUTPATIENT
Start: 2025-03-27 | End: 2025-04-02 | Stop reason: HOSPADM

## 2025-03-27 RX ORDER — ONDANSETRON 2 MG/ML
INJECTION INTRAMUSCULAR; INTRAVENOUS
Status: DISCONTINUED | OUTPATIENT
Start: 2025-03-27 | End: 2025-03-27 | Stop reason: SDUPTHER

## 2025-03-27 RX ORDER — ONDANSETRON 2 MG/ML
4 INJECTION INTRAMUSCULAR; INTRAVENOUS EVERY 6 HOURS PRN
Status: DISCONTINUED | OUTPATIENT
Start: 2025-03-27 | End: 2025-04-02 | Stop reason: HOSPADM

## 2025-03-27 RX ORDER — KETOROLAC TROMETHAMINE 30 MG/ML
INJECTION, SOLUTION INTRAMUSCULAR; INTRAVENOUS
Status: DISCONTINUED | OUTPATIENT
Start: 2025-03-27 | End: 2025-03-27 | Stop reason: SDUPTHER

## 2025-03-27 RX ORDER — SODIUM CHLORIDE, SODIUM LACTATE, POTASSIUM CHLORIDE, CALCIUM CHLORIDE 600; 310; 30; 20 MG/100ML; MG/100ML; MG/100ML; MG/100ML
INJECTION, SOLUTION INTRAVENOUS CONTINUOUS
Status: DISCONTINUED | OUTPATIENT
Start: 2025-03-27 | End: 2025-04-02 | Stop reason: HOSPADM

## 2025-03-27 RX ORDER — METRONIDAZOLE 500 MG/100ML
500 INJECTION, SOLUTION INTRAVENOUS ONCE
Status: COMPLETED | OUTPATIENT
Start: 2025-03-27 | End: 2025-03-27

## 2025-03-27 RX ORDER — HYDROXYZINE HYDROCHLORIDE 25 MG/1
50 TABLET, FILM COATED ORAL NIGHTLY PRN
Status: DISCONTINUED | OUTPATIENT
Start: 2025-03-27 | End: 2025-04-02 | Stop reason: HOSPADM

## 2025-03-27 RX ORDER — PHENYLEPHRINE HCL IN 0.9% NACL 0.4MG/10ML
SYRINGE (ML) INTRAVENOUS
Status: DISCONTINUED | OUTPATIENT
Start: 2025-03-27 | End: 2025-03-27 | Stop reason: SDUPTHER

## 2025-03-27 RX ORDER — ONDANSETRON 4 MG/1
4 TABLET, ORALLY DISINTEGRATING ORAL EVERY 8 HOURS PRN
Status: DISCONTINUED | OUTPATIENT
Start: 2025-03-27 | End: 2025-04-02 | Stop reason: HOSPADM

## 2025-03-27 RX ORDER — TRAZODONE HYDROCHLORIDE 50 MG/1
50 TABLET ORAL NIGHTLY
Status: DISCONTINUED | OUTPATIENT
Start: 2025-03-27 | End: 2025-04-02 | Stop reason: HOSPADM

## 2025-03-27 RX ORDER — FENTANYL CITRATE 50 UG/ML
100 INJECTION, SOLUTION INTRAMUSCULAR; INTRAVENOUS
Status: DISCONTINUED | OUTPATIENT
Start: 2025-03-27 | End: 2025-03-27 | Stop reason: HOSPADM

## 2025-03-27 RX ORDER — EPHEDRINE SULFATE/0.9% NACL/PF 50 MG/5 ML
SYRINGE (ML) INTRAVENOUS
Status: DISCONTINUED | OUTPATIENT
Start: 2025-03-27 | End: 2025-03-27 | Stop reason: SDUPTHER

## 2025-03-27 RX ORDER — ENOXAPARIN SODIUM 100 MG/ML
30 INJECTION SUBCUTANEOUS 2 TIMES DAILY
Status: DISCONTINUED | OUTPATIENT
Start: 2025-03-28 | End: 2025-04-02 | Stop reason: HOSPADM

## 2025-03-27 RX ORDER — SODIUM CHLORIDE 0.9 % (FLUSH) 0.9 %
5-40 SYRINGE (ML) INJECTION PRN
Status: DISCONTINUED | OUTPATIENT
Start: 2025-03-27 | End: 2025-04-02 | Stop reason: HOSPADM

## 2025-03-27 RX ORDER — DEXAMETHASONE SODIUM PHOSPHATE 4 MG/ML
INJECTION, SOLUTION INTRA-ARTICULAR; INTRALESIONAL; INTRAMUSCULAR; INTRAVENOUS; SOFT TISSUE
Status: DISCONTINUED | OUTPATIENT
Start: 2025-03-27 | End: 2025-03-27 | Stop reason: SDUPTHER

## 2025-03-27 RX ORDER — FENTANYL CITRATE 50 UG/ML
INJECTION, SOLUTION INTRAMUSCULAR; INTRAVENOUS
Status: DISCONTINUED | OUTPATIENT
Start: 2025-03-27 | End: 2025-03-27 | Stop reason: SDUPTHER

## 2025-03-27 RX ORDER — ALBUMIN HUMAN 50 G/1000ML
SOLUTION INTRAVENOUS
Status: DISCONTINUED | OUTPATIENT
Start: 2025-03-27 | End: 2025-03-27 | Stop reason: SDUPTHER

## 2025-03-27 RX ORDER — SODIUM CHLORIDE 0.9 % (FLUSH) 0.9 %
5-40 SYRINGE (ML) INJECTION EVERY 12 HOURS SCHEDULED
Status: DISCONTINUED | OUTPATIENT
Start: 2025-03-27 | End: 2025-04-02 | Stop reason: HOSPADM

## 2025-03-27 RX ORDER — DOCUSATE SODIUM 100 MG/1
100 CAPSULE, LIQUID FILLED ORAL 2 TIMES DAILY
Status: DISCONTINUED | OUTPATIENT
Start: 2025-03-27 | End: 2025-03-31

## 2025-03-27 RX ORDER — LIDOCAINE HYDROCHLORIDE 10 MG/ML
1 INJECTION, SOLUTION EPIDURAL; INFILTRATION; INTRACAUDAL; PERINEURAL
Status: DISCONTINUED | OUTPATIENT
Start: 2025-03-27 | End: 2025-03-27 | Stop reason: HOSPADM

## 2025-03-27 RX ORDER — NALOXONE HYDROCHLORIDE 0.4 MG/ML
INJECTION, SOLUTION INTRAMUSCULAR; INTRAVENOUS; SUBCUTANEOUS PRN
Status: DISCONTINUED | OUTPATIENT
Start: 2025-03-27 | End: 2025-03-27 | Stop reason: HOSPADM

## 2025-03-27 RX ORDER — ARIPIPRAZOLE 5 MG/1
20 TABLET ORAL NIGHTLY
Status: DISCONTINUED | OUTPATIENT
Start: 2025-03-27 | End: 2025-04-02 | Stop reason: HOSPADM

## 2025-03-27 RX ORDER — MIDAZOLAM HYDROCHLORIDE 2 MG/2ML
2 INJECTION, SOLUTION INTRAMUSCULAR; INTRAVENOUS
Status: DISCONTINUED | OUTPATIENT
Start: 2025-03-27 | End: 2025-03-27 | Stop reason: HOSPADM

## 2025-03-27 RX ORDER — SODIUM CHLORIDE, SODIUM LACTATE, POTASSIUM CHLORIDE, CALCIUM CHLORIDE 600; 310; 30; 20 MG/100ML; MG/100ML; MG/100ML; MG/100ML
INJECTION, SOLUTION INTRAVENOUS
Status: DISCONTINUED | OUTPATIENT
Start: 2025-03-27 | End: 2025-03-27 | Stop reason: SDUPTHER

## 2025-03-27 RX ORDER — SODIUM CHLORIDE, SODIUM LACTATE, POTASSIUM CHLORIDE, CALCIUM CHLORIDE 600; 310; 30; 20 MG/100ML; MG/100ML; MG/100ML; MG/100ML
INJECTION, SOLUTION INTRAVENOUS CONTINUOUS
Status: DISCONTINUED | OUTPATIENT
Start: 2025-03-27 | End: 2025-03-27 | Stop reason: HOSPADM

## 2025-03-27 RX ORDER — DIPHENHYDRAMINE HYDROCHLORIDE 50 MG/ML
12.5 INJECTION, SOLUTION INTRAMUSCULAR; INTRAVENOUS
Status: DISCONTINUED | OUTPATIENT
Start: 2025-03-27 | End: 2025-03-27 | Stop reason: HOSPADM

## 2025-03-27 RX ORDER — ROCURONIUM BROMIDE 10 MG/ML
INJECTION, SOLUTION INTRAVENOUS
Status: DISCONTINUED | OUTPATIENT
Start: 2025-03-27 | End: 2025-03-27 | Stop reason: SDUPTHER

## 2025-03-27 RX ORDER — BUPIVACAINE HYDROCHLORIDE 2.5 MG/ML
INJECTION, SOLUTION EPIDURAL; INFILTRATION; INTRACAUDAL; PERINEURAL
Status: DISCONTINUED | OUTPATIENT
Start: 2025-03-27 | End: 2025-03-27 | Stop reason: SDUPTHER

## 2025-03-27 RX ORDER — SODIUM CHLORIDE 9 MG/ML
INJECTION, SOLUTION INTRAVENOUS CONTINUOUS
Status: DISCONTINUED | OUTPATIENT
Start: 2025-03-27 | End: 2025-03-27 | Stop reason: HOSPADM

## 2025-03-27 RX ORDER — KETOROLAC TROMETHAMINE 30 MG/ML
30 INJECTION, SOLUTION INTRAMUSCULAR; INTRAVENOUS EVERY 6 HOURS
Status: COMPLETED | OUTPATIENT
Start: 2025-03-27 | End: 2025-03-28

## 2025-03-27 RX ORDER — ONDANSETRON 2 MG/ML
4 INJECTION INTRAMUSCULAR; INTRAVENOUS
Status: DISCONTINUED | OUTPATIENT
Start: 2025-03-27 | End: 2025-03-27 | Stop reason: HOSPADM

## 2025-03-27 RX ORDER — FAMOTIDINE 10 MG/ML
INJECTION, SOLUTION INTRAVENOUS
Status: DISCONTINUED | OUTPATIENT
Start: 2025-03-27 | End: 2025-03-27 | Stop reason: SDUPTHER

## 2025-03-27 RX ORDER — SODIUM CHLORIDE 9 MG/ML
INJECTION, SOLUTION INTRAVENOUS PRN
Status: DISCONTINUED | OUTPATIENT
Start: 2025-03-27 | End: 2025-04-02 | Stop reason: HOSPADM

## 2025-03-27 RX ADMIN — BUPIVACAINE HYDROCHLORIDE 10 ML: 2.5 INJECTION, SOLUTION EPIDURAL; INFILTRATION; INTRACAUDAL; PERINEURAL at 11:39

## 2025-03-27 RX ADMIN — SODIUM CHLORIDE, POTASSIUM CHLORIDE, SODIUM LACTATE AND CALCIUM CHLORIDE: 600; 310; 30; 20 INJECTION, SOLUTION INTRAVENOUS at 10:12

## 2025-03-27 RX ADMIN — KETOROLAC TROMETHAMINE 30 MG: 30 INJECTION, SOLUTION INTRAMUSCULAR at 10:52

## 2025-03-27 RX ADMIN — Medication 40 MCG: at 08:51

## 2025-03-27 RX ADMIN — FAMOTIDINE 20 MG: 10 INJECTION INTRAVENOUS at 08:16

## 2025-03-27 RX ADMIN — PROPOFOL 50 MG: 10 INJECTION, EMULSION INTRAVENOUS at 11:31

## 2025-03-27 RX ADMIN — MIDAZOLAM HYDROCHLORIDE 2 MG: 1 INJECTION, SOLUTION INTRAMUSCULAR; INTRAVENOUS at 08:16

## 2025-03-27 RX ADMIN — FENTANYL CITRATE 25 MCG: 50 INJECTION, SOLUTION INTRAMUSCULAR; INTRAVENOUS at 11:18

## 2025-03-27 RX ADMIN — PROPOFOL 50 MCG/KG/MIN: 10 INJECTION, EMULSION INTRAVENOUS at 08:28

## 2025-03-27 RX ADMIN — PHENYLEPHRINE HYDROCHLORIDE 50 MCG/MIN: 10 INJECTION INTRAVENOUS at 08:53

## 2025-03-27 RX ADMIN — ROCURONIUM BROMIDE 10 MG: 10 INJECTION INTRAVENOUS at 08:48

## 2025-03-27 RX ADMIN — Medication 5 MG: at 08:46

## 2025-03-27 RX ADMIN — ROCURONIUM BROMIDE 40 MG: 10 INJECTION INTRAVENOUS at 08:26

## 2025-03-27 RX ADMIN — FENTANYL CITRATE 25 MCG: 50 INJECTION, SOLUTION INTRAMUSCULAR; INTRAVENOUS at 10:02

## 2025-03-27 RX ADMIN — BUPIVACAINE 10 ML: 13.3 INJECTION, SUSPENSION, LIPOSOMAL INFILTRATION at 11:34

## 2025-03-27 RX ADMIN — HYDROMORPHONE HYDROCHLORIDE 0.5 MG: 1 INJECTION, SOLUTION INTRAMUSCULAR; INTRAVENOUS; SUBCUTANEOUS at 12:26

## 2025-03-27 RX ADMIN — SODIUM CHLORIDE, PRESERVATIVE FREE 10 ML: 5 INJECTION INTRAVENOUS at 21:19

## 2025-03-27 RX ADMIN — Medication 5 MG: at 08:37

## 2025-03-27 RX ADMIN — PROPOFOL 20 MG: 10 INJECTION, EMULSION INTRAVENOUS at 11:35

## 2025-03-27 RX ADMIN — FENTANYL CITRATE 25 MCG: 50 INJECTION, SOLUTION INTRAMUSCULAR; INTRAVENOUS at 10:46

## 2025-03-27 RX ADMIN — FENTANYL CITRATE 25 MCG: 50 INJECTION, SOLUTION INTRAMUSCULAR; INTRAVENOUS at 09:51

## 2025-03-27 RX ADMIN — BUSPIRONE HYDROCHLORIDE 5 MG: 5 TABLET ORAL at 21:19

## 2025-03-27 RX ADMIN — TRAZODONE HYDROCHLORIDE 50 MG: 50 TABLET ORAL at 21:19

## 2025-03-27 RX ADMIN — ROCURONIUM BROMIDE 10 MG: 10 INJECTION INTRAVENOUS at 08:54

## 2025-03-27 RX ADMIN — ROCURONIUM BROMIDE 10 MG: 10 INJECTION INTRAVENOUS at 09:21

## 2025-03-27 RX ADMIN — Medication 40 MCG: at 09:04

## 2025-03-27 RX ADMIN — PROPOFOL 200 MG: 10 INJECTION, EMULSION INTRAVENOUS at 08:26

## 2025-03-27 RX ADMIN — KETOROLAC TROMETHAMINE 30 MG: 30 INJECTION, SOLUTION INTRAMUSCULAR at 17:04

## 2025-03-27 RX ADMIN — ROCURONIUM BROMIDE 10 MG: 10 INJECTION INTRAVENOUS at 09:27

## 2025-03-27 RX ADMIN — BUPIVACAINE 10 ML: 13.3 INJECTION, SUSPENSION, LIPOSOMAL INFILTRATION at 11:39

## 2025-03-27 RX ADMIN — CEFAZOLIN 3000 MG: 3 INJECTION, POWDER, FOR SOLUTION INTRAVENOUS at 08:29

## 2025-03-27 RX ADMIN — DOCUSATE SODIUM 100 MG: 100 CAPSULE, LIQUID FILLED ORAL at 21:19

## 2025-03-27 RX ADMIN — ROCURONIUM BROMIDE 10 MG: 10 INJECTION INTRAVENOUS at 09:24

## 2025-03-27 RX ADMIN — HYDROMORPHONE HYDROCHLORIDE 0.5 MG: 1 INJECTION, SOLUTION INTRAMUSCULAR; INTRAVENOUS; SUBCUTANEOUS at 12:10

## 2025-03-27 RX ADMIN — DEXAMETHASONE SODIUM PHOSPHATE 8 MG: 4 INJECTION INTRA-ARTICULAR; INTRALESIONAL; INTRAMUSCULAR; INTRAVENOUS; SOFT TISSUE at 09:01

## 2025-03-27 RX ADMIN — ARIPIPRAZOLE 20 MG: 5 TABLET ORAL at 21:19

## 2025-03-27 RX ADMIN — KETOROLAC TROMETHAMINE 30 MG: 30 INJECTION, SOLUTION INTRAMUSCULAR at 22:33

## 2025-03-27 RX ADMIN — ONDANSETRON HYDROCHLORIDE 4 MG: 2 SOLUTION INTRAMUSCULAR; INTRAVENOUS at 10:52

## 2025-03-27 RX ADMIN — SUGAMMADEX 200 MG: 100 INJECTION, SOLUTION INTRAVENOUS at 11:35

## 2025-03-27 RX ADMIN — FENTANYL CITRATE 50 MCG: 50 INJECTION, SOLUTION INTRAMUSCULAR; INTRAVENOUS at 08:26

## 2025-03-27 RX ADMIN — SODIUM CHLORIDE, SODIUM LACTATE, POTASSIUM CHLORIDE, AND CALCIUM CHLORIDE: .6; .31; .03; .02 INJECTION, SOLUTION INTRAVENOUS at 21:22

## 2025-03-27 RX ADMIN — SODIUM CHLORIDE, POTASSIUM CHLORIDE, SODIUM LACTATE AND CALCIUM CHLORIDE: 600; 310; 30; 20 INJECTION, SOLUTION INTRAVENOUS at 08:26

## 2025-03-27 RX ADMIN — METRONIDAZOLE 500 MG: 500 INJECTION, SOLUTION INTRAVENOUS at 08:28

## 2025-03-27 RX ADMIN — BUPIVACAINE HYDROCHLORIDE 10 ML: 2.5 INJECTION, SOLUTION EPIDURAL; INFILTRATION; INTRACAUDAL; PERINEURAL at 11:34

## 2025-03-27 RX ADMIN — PROPOFOL 30 MG: 10 INJECTION, EMULSION INTRAVENOUS at 11:30

## 2025-03-27 RX ADMIN — ALBUMIN (HUMAN) 12.5 G: 12.5 SOLUTION INTRAVENOUS at 09:48

## 2025-03-27 RX ADMIN — Medication 40 MCG: at 10:51

## 2025-03-27 ASSESSMENT — PAIN SCALES - GENERAL
PAINLEVEL_OUTOF10: 5
PAINLEVEL_OUTOF10: 6
PAINLEVEL_OUTOF10: 3
PAINLEVEL_OUTOF10: 4
PAINLEVEL_OUTOF10: 0
PAINLEVEL_OUTOF10: 3
PAINLEVEL_OUTOF10: 5

## 2025-03-27 ASSESSMENT — PAIN DESCRIPTION - DESCRIPTORS
DESCRIPTORS: ACHING;SORE
DESCRIPTORS: ACHING;SORE
DESCRIPTORS: ACHING
DESCRIPTORS: ACHING;SORE

## 2025-03-27 ASSESSMENT — PAIN DESCRIPTION - LOCATION
LOCATION: ABDOMEN

## 2025-03-27 ASSESSMENT — PAIN DESCRIPTION - ORIENTATION
ORIENTATION: LOWER;MID
ORIENTATION: MID
ORIENTATION: LOWER
ORIENTATION: LOWER;MID

## 2025-03-27 ASSESSMENT — PAIN - FUNCTIONAL ASSESSMENT
PAIN_FUNCTIONAL_ASSESSMENT: 0-10
PAIN_FUNCTIONAL_ASSESSMENT: ACTIVITIES ARE NOT PREVENTED
PAIN_FUNCTIONAL_ASSESSMENT: ACTIVITIES ARE NOT PREVENTED

## 2025-03-27 ASSESSMENT — PAIN DESCRIPTION - FREQUENCY: FREQUENCY: CONTINUOUS

## 2025-03-27 ASSESSMENT — PAIN DESCRIPTION - PAIN TYPE: TYPE: SURGICAL PAIN

## 2025-03-27 ASSESSMENT — PAIN DESCRIPTION - ONSET: ONSET: PROGRESSIVE

## 2025-03-27 NOTE — PERIOP NOTE
TRANSFER - OUT REPORT:    Verbal report given to 525 on Pat Szymanski  being transferred to [facility] for routine post-op       Report consisted of patient's Situation, Background, Assessment and   Recommendations(SBAR).     Information from the following report(s) Nurse Handoff Report, Surgery Report, Intake/Output, MAR, Cardiac Rhythm nsr, and Procedure Verification was reviewed with the receiving nurse.           Lines:   Peripheral IV 03/27/25 Posterior;Right Hand (Active)   Site Assessment Clean, dry & intact 03/27/25 1448   Line Status Blood return noted 03/27/25 1448   Line Care Connections checked and tightened 03/27/25 1448   Phlebitis Assessment No symptoms 03/27/25 1448   Infiltration Assessment 0 03/27/25 1448   Alcohol Cap Used Yes 03/27/25 0635   Dressing Status New dressing applied;Clean, dry & intact 03/27/25 1448   Dressing Type Transparent 03/27/25 1448   Dressing Intervention New 03/27/25 0635        Opportunity for questions and clarification was provided.      Patient transported with:  Registered Nurse

## 2025-03-27 NOTE — ANESTHESIA PROCEDURE NOTES
Peripheral Block    Patient location during procedure: pre-op  Reason for block: procedure for pain, post-op pain management, primary anesthetic and at surgeon's request  Start time: 3/27/2025 11:29 AM  End time: 3/27/2025 11:39 AM  Staffing  Performed: anesthesiologist   Anesthesiologist: Dionisio Rodríguez MD  Performed by: Dionisio Rodríguez MD  Authorized by: Dionisio Rodríguez MD    Preanesthetic Checklist  Completed: patient identified, IV checked, site marked, risks and benefits discussed, surgical/procedural consents, pre-op evaluation, timeout performed, anesthesia consent given, oxygen available and monitors applied/VS acknowledged  Peripheral Block   Patient position: supine  Prep: ChloraPrep  Provider prep: mask and sterile gloves  Patient monitoring: cardiac monitor, continuous pulse ox, continuous capnometry, frequent blood pressure checks, IV access, oxygen and responsive to questions  Block type: TAP  Laterality: bilateral  Injection technique: single-shot  Guidance: ultrasound guided    Needle   Needle type: Other   Needle gauge: 21 G  Needle localization: ultrasound guidance  Needle length: 10 cmOther needle type: STIMUPLEX  Assessment   Injection assessment: negative aspiration for heme, no paresthesia on injection, local visualized surrounding nerve on ultrasound and no intravascular symptoms  Hemodynamics: stable  Outcomes: patient tolerated procedure well    Medications Administered  BUPivacaine liposome (EXPAREL) injection 1.3% - Perineural   10 mL - 3/27/2025 11:39:00 AM

## 2025-03-27 NOTE — ANESTHESIA PRE PROCEDURE
Department of Anesthesiology  Preprocedure Note       Name:  Pat Szymanski   Age:  44 y.o.  :  1980                                          MRN:  [id number]         Date:  3/27/2025      Surgeon: Surgeon(s):  Yenny Francisco MD Deal, Laura, MD    Procedure: Procedure(s):  TOTAL ABDOMINAL HYSTERECTOMY, BILATERAL SALPINGECTOMY    Medications prior to admission:   Prior to Admission medications    Medication Sig Start Date End Date Taking? Authorizing Provider   busPIRone (BUSPAR) 5 MG tablet Take 1 tablet by mouth 2 times daily   Yes Provider, MD Jose   FIBER GUMMIES PO Take by mouth daily   Yes Provider, MD Jose   Vitamin D3 125 MCG (5000 UT) TABS tablet Take 1 tablet by mouth daily   Yes ProviderJose MD   Drospirenone (SLYND) 4 MG TABS Take 1 tablet by mouth daily 24  Yes Yenny Francisco MD   ferrous sulfate (FEROSUL) 325 (65 Fe) MG tablet Take 1 tablet by mouth daily (with breakfast) 24  Yes Kathya Bennett APRN - CNP   ketoconazole (NIZORAL) 2 % cream APPLY TOPICALLY TO FEET TWICE DAILY 24  Yes Provider, MD Jose   ABILIFY 20 MG tablet Take 1 tablet by mouth at bedtime 24  Yes Provider, MD Jose   albuterol sulfate HFA (PROVENTIL;VENTOLIN;PROAIR) 108 (90 Base) MCG/ACT inhaler INHALE 2 PUFFS BY MOUTH FOUR TIMES DAILY AS NEEDED FOR WHEEZING OR SHORTNESS OF BREATH 23  Yes Provider, MD Jose   hydrOXYzine pamoate (VISTARIL) 50 MG capsule TAKE 1 CAPSULE BY MOUTH AT BEDTIME AS NEEDED   Yes Provider, MD Jose   ibuprofen (ADVIL;MOTRIN) 800 MG tablet Take 1 tablet by mouth daily as needed 20  Yes Provider, MD Jose   traZODone (DESYREL) 50 MG tablet Take 1 tablet by mouth nightly at bedtime.   Yes ProviderJose MD   MAYZENT 2 MG TABS Take 1 tablet by mouth nightly 22  Yes ProviderJose MD   VORTIoxetine HBr (TRINTELLIX) 20 MG TABS tablet Take 1 tablet by mouth daily 19  Yes Automatic Reconciliation, Ar

## 2025-03-27 NOTE — BRIEF OP NOTE
Brief Postoperative Note      Patient: Pat Szymanski  YOB: 1980  MRN: [id number]    Date of Procedure: 3/27/2025    Pre-Op Diagnosis Codes:      * Fibroid, uterine [D25.9]     * Menorrhagia [N92.0]    Post-Op Diagnosis: Same       Procedure(s):  TOTAL ABDOMINAL HYSTERECTOMY, BILATERAL SALPINGECTOMY    Surgeon(s):  Autumn Ramos MD Hicks, Tracy, MD    Assistant:  Surgical Assistant: Chapito Ramon    Anesthesia: General    Estimated Blood Loss (mL): 200     Complications: None    Specimens:   ID Type Source Tests Collected by Time Destination   1 : Uterus, cervix, bilateral fallopian tubes Tissue Uterus SURGICAL PATHOLOGY Yenny Francisco MD 3/27/2025 0949        Implants:  * No implants in log *      Drains:   Urinary Catheter 03/27/25 2 Way;Gonzalez-Temperature (Active)       Findings:  Infection Present At Time Of Surgery (PATOS) (choose all levels that have infection present):  No infection present  Other Findings: >1000g fibroid uterus    Electronically signed by Yenny Francisco MD on 3/27/2025 at 12:11 PM

## 2025-03-27 NOTE — OP NOTE
was identified and the rectus muscles were .  The peritoneum was entered sharply under direct visualization and the incision was done superiorly and inferiorly.  The uterus was bulky and extended across the entire abdomen.  The patient was placed in Trendelenburg, 2 Dover retractors were placed and the bowels were packed.  Large clamps were placed across both cornua.  Attention was first turned to the left side.  The left round ligament was ligated and cut.  Incision was made in the anterior leaf of the broad ligament carried forward towards the bladder.  The left utero-ovarian ligament was then isolated doubly clamped cut and doubly ligated.  Attention was then turned to the right side where the right round ligament was ligated in a similar fashion.  An incision was made in the intra leaf of the broad ligament carried forward across the lower uterine segment to meet the previous incision.  The bladder was bluntly sharply dissected out of the operative field.  The right utero-ovarian ligament was then isolated doubly clamped cut and doubly ligated.  At this point it was extremely difficult to get visualization and the decision was made to extend the incision above the umbilicus.  This was subsequently performed.  All the retractors and bowels were replaced.  Visualization was much better.  The uterine vessels were identified on the right side skeletonized doubly clamped cut and doubly ligated.  The uterine vessels were then skeletonized on the left side doubly clamped cut and doubly ligated.  The bladder was once again noted to be adequately out of the operative field.  The uterine fundus was then amputated and handed off the table.  Weight in the OR was greater than 1000 g.  The cervix was grasped with Kocher clamps.  The cardinal uterosacral vessels were then subsequently clamped with straight clamps cut and ligated with any transfixion sutures of 0 Vicryl.  This was done to the level of the cervix

## 2025-03-27 NOTE — PROGRESS NOTES
Spiritual Health History and Assessment/Progress Note  [facility] [place] 2[place]    Pre-Op,  ,  ,      Name: Pat Szymanski MRN: [id number]    Age: 44 y.o.     Sex: female   Language: English   [employer]ybeterian: Mormon   Fibroids     Date: 3/27/2025            Total Time Calculated: 18 min              Spiritual Assessment began in [facility]        Referral/Consult From: Patient   Encounter Overview/Reason: Pre-Op  Service Provided For: Patient and family together    Aleena, Belief, Meaning:   Patient identifies as spiritual, is connected with a aleena tradition or spiritual practice, and has beliefs or practices that help with coping during difficult times  Family/Friends identify as spiritual, are connected with a aleena tradition or spiritual practice, and have beliefs or practices that help with coping during difficult times      Importance and Influence:  Patient has spiritual/personal beliefs that influence decisions regarding their health  Family/Friends have spiritual/personal beliefs that influence decisions regarding the patient's health    Community:  Patient is connected with a spiritual community and feels well-supported. Support system includes: Parent/s, Aleena Community, Friends, and Extended family  Family/Friends are connected with a spiritual community: and feel well-supported. Support system includes: Children, Aleena Community, Friends, and Extended family    Assessment and Plan of Care:   Patient requested Pre-Op prayer before procedure.  met and consulted with attending RN who introduced  to Pt and her father in pre-op room. There they shared life review and their Caodaism aleena, hoping for the greatest outcome.  listened, spoke words of comfort and support and held prayer. Ms. Szymanski and dad expressed much appreciation.       Patient Interventions include: Facilitated expression of thoughts and feelings, Explored spiritual coping/struggle/distress, Engaged in theological

## 2025-03-27 NOTE — ANESTHESIA POSTPROCEDURE EVALUATION
Department of Anesthesiology  Postprocedure Note    Patient: Pat Szymanski  MRN: [id number]  YOB: 1980  Date of evaluation: 3/27/2025    Procedure Summary       Date: 03/27/25 Room / Location: [facility] MAIN OR F6 / [facility] MAIN OR    Anesthesia Start: 0816 Anesthesia Stop: 1152    Procedure: TOTAL ABDOMINAL HYSTERECTOMY, BILATERAL SALPINGECTOMY (Abdomen/Perineum) Diagnosis:       Fibroid, uterine      Menorrhagia      (Fibroid, uterine [D25.9])      (Menorrhagia [N92.0])    Surgeons: Yenny Francisco MD Responsible Provider: Dionisio Rodríguez MD    Anesthesia Type: general, TIVA ASA Status: 3            Anesthesia Type: No value filed.    Farzaneh Phase I: Farzaneh Score: 10    Farzaneh Phase II:      Anesthesia Post Evaluation    Patient location during evaluation: PACU  Patient participation: complete - patient participated  Level of consciousness: awake  Airway patency: patent  Nausea & Vomiting: no vomiting and no nausea  Cardiovascular status: hemodynamically stable  Respiratory status: acceptable  Hydration status: stable  Multimodal analgesia pain management approach  Pain management: adequate    No notable events documented.

## 2025-03-28 LAB
ERYTHROCYTE [DISTWIDTH] IN BLOOD BY AUTOMATED COUNT: 12.5 % (ref 11.5–14.5)
HCT VFR BLD AUTO: 37.7 % (ref 35–47)
HGB BLD-MCNC: 12.5 G/DL (ref 11.5–16)
MCH RBC QN AUTO: 29.1 PG (ref 26–34)
MCHC RBC AUTO-ENTMCNC: 33.2 G/DL (ref 30–36.5)
MCV RBC AUTO: 87.7 FL (ref 80–99)
NRBC # BLD: 0 K/UL (ref 0–0.01)
NRBC BLD-RTO: 0 PER 100 WBC
PLATELET # BLD AUTO: 370 K/UL (ref 150–400)
PMV BLD AUTO: 9.6 FL (ref 8.9–12.9)
RBC # BLD AUTO: 4.3 M/UL (ref 3.8–5.2)
WBC # BLD AUTO: 8.4 K/UL (ref 3.6–11)

## 2025-03-28 PROCEDURE — 6360000002 HC RX W HCPCS: Performed by: OBSTETRICS & GYNECOLOGY

## 2025-03-28 PROCEDURE — 97166 OT EVAL MOD COMPLEX 45 MIN: CPT

## 2025-03-28 PROCEDURE — 51798 US URINE CAPACITY MEASURE: CPT

## 2025-03-28 PROCEDURE — 2500000003 HC RX 250 WO HCPCS: Performed by: OBSTETRICS & GYNECOLOGY

## 2025-03-28 PROCEDURE — 97530 THERAPEUTIC ACTIVITIES: CPT

## 2025-03-28 PROCEDURE — 94761 N-INVAS EAR/PLS OXIMETRY MLT: CPT

## 2025-03-28 PROCEDURE — 97162 PT EVAL MOD COMPLEX 30 MIN: CPT

## 2025-03-28 PROCEDURE — 6370000000 HC RX 637 (ALT 250 FOR IP): Performed by: OBSTETRICS & GYNECOLOGY

## 2025-03-28 PROCEDURE — 85027 COMPLETE CBC AUTOMATED: CPT

## 2025-03-28 PROCEDURE — 97116 GAIT TRAINING THERAPY: CPT

## 2025-03-28 PROCEDURE — 1100000000 HC RM PRIVATE

## 2025-03-28 PROCEDURE — 2580000003 HC RX 258: Performed by: OBSTETRICS & GYNECOLOGY

## 2025-03-28 RX ORDER — OXYCODONE HYDROCHLORIDE 5 MG/1
5 TABLET ORAL EVERY 4 HOURS PRN
Refills: 0 | Status: DISCONTINUED | OUTPATIENT
Start: 2025-03-28 | End: 2025-04-02 | Stop reason: HOSPADM

## 2025-03-28 RX ORDER — OXYCODONE HYDROCHLORIDE 5 MG/1
10 TABLET ORAL EVERY 4 HOURS PRN
Refills: 0 | Status: DISCONTINUED | OUTPATIENT
Start: 2025-03-28 | End: 2025-04-02 | Stop reason: HOSPADM

## 2025-03-28 RX ADMIN — SODIUM CHLORIDE, PRESERVATIVE FREE 10 ML: 5 INJECTION INTRAVENOUS at 09:48

## 2025-03-28 RX ADMIN — KETOROLAC TROMETHAMINE 30 MG: 30 INJECTION, SOLUTION INTRAMUSCULAR at 05:09

## 2025-03-28 RX ADMIN — TRAZODONE HYDROCHLORIDE 50 MG: 50 TABLET ORAL at 20:15

## 2025-03-28 RX ADMIN — VORTIOXETINE 20 MG: 10 TABLET, FILM COATED ORAL at 09:54

## 2025-03-28 RX ADMIN — ARIPIPRAZOLE 20 MG: 5 TABLET ORAL at 20:15

## 2025-03-28 RX ADMIN — KETOROLAC TROMETHAMINE 30 MG: 30 INJECTION, SOLUTION INTRAMUSCULAR at 12:49

## 2025-03-28 RX ADMIN — ENOXAPARIN SODIUM 30 MG: 100 INJECTION SUBCUTANEOUS at 20:14

## 2025-03-28 RX ADMIN — SODIUM CHLORIDE, SODIUM LACTATE, POTASSIUM CHLORIDE, AND CALCIUM CHLORIDE: .6; .31; .03; .02 INJECTION, SOLUTION INTRAVENOUS at 05:10

## 2025-03-28 RX ADMIN — BUSPIRONE HYDROCHLORIDE 5 MG: 5 TABLET ORAL at 20:15

## 2025-03-28 RX ADMIN — BUSPIRONE HYDROCHLORIDE 5 MG: 5 TABLET ORAL at 09:48

## 2025-03-28 RX ADMIN — DOCUSATE SODIUM 100 MG: 100 CAPSULE, LIQUID FILLED ORAL at 20:15

## 2025-03-28 RX ADMIN — SODIUM CHLORIDE, PRESERVATIVE FREE 10 ML: 5 INJECTION INTRAVENOUS at 20:16

## 2025-03-28 RX ADMIN — ONDANSETRON 4 MG: 4 TABLET, ORALLY DISINTEGRATING ORAL at 17:45

## 2025-03-28 RX ADMIN — OXYCODONE 5 MG: 5 TABLET ORAL at 14:19

## 2025-03-28 RX ADMIN — IBUPROFEN 800 MG: 200 TABLET, FILM COATED ORAL at 17:45

## 2025-03-28 RX ADMIN — DOCUSATE SODIUM 100 MG: 100 CAPSULE, LIQUID FILLED ORAL at 09:48

## 2025-03-28 RX ADMIN — ENOXAPARIN SODIUM 30 MG: 100 INJECTION SUBCUTANEOUS at 09:48

## 2025-03-28 ASSESSMENT — PAIN SCALES - GENERAL
PAINLEVEL_OUTOF10: 5
PAINLEVEL_OUTOF10: 5
PAINLEVEL_OUTOF10: 0
PAINLEVEL_OUTOF10: 0
PAINLEVEL_OUTOF10: 5

## 2025-03-28 ASSESSMENT — PAIN DESCRIPTION - DESCRIPTORS
DESCRIPTORS: ACHING
DESCRIPTORS: ACHING

## 2025-03-28 ASSESSMENT — PAIN DESCRIPTION - ORIENTATION
ORIENTATION: MID

## 2025-03-28 ASSESSMENT — PAIN DESCRIPTION - LOCATION
LOCATION: ABDOMEN

## 2025-03-28 NOTE — PROGRESS NOTES
Gynecology Progress Note    Pat Szymanski      She is without significant complaints. Pain controlled on current medication.   Patient is passing flatus. She is is tolerating her diet. Has not voided - neil out 4 hours ago        Vitals:  BP (!) 132/93   Pulse 92   Temp 98.1 °F (36.7 °C) (Oral)   Resp 22   Ht 1.702 m (5' 7\")   Wt (!) 136.5 kg (301 lb)   LMP  (LMP Unknown)   SpO2 97%   BMI 47.14 kg/m²   Temp (24hrs), Av.4 °F (36.9 °C), Min:98.1 °F (36.7 °C), Max:98.8 °F (37.1 °C)      Last 24hr Input/Output:    Intake/Output Summary (Last 24 hours) at 3/28/2025 1654  Last data filed at 3/28/2025 0652  Gross per 24 hour   Intake 1158.96 ml   Output 950 ml   Net 208.96 ml          Exam:  General: alert, appears stated age, and cooperative       Abdomen: abdomen is soft without significant tenderness, masses, organomegaly or guarding     Extremities: extremities normal, atraumatic, no cyanosis or edema      Labs:   Lab Results   Component Value Date/Time    WBC 8.4 2025 03:45 AM    WBC 6.0 2025 03:34 PM    WBC 7.8 2024 03:11 PM    WBC 7.8 2024 03:30 PM    WBC 6.3 10/08/2020 01:52 PM    WBC 6.4 2020 01:52 PM    WBC 6.7 03/10/2020 03:48 PM    WBC 4.6 08/15/2019 01:24 PM    WBC 8.3 2019 02:56 PM    WBC Normal 2018 01:57 PM    WBC 7.8 2018 01:57 PM    HGB 12.5 2025 03:45 AM    HGB 15.5 2025 03:34 PM    HGB 13.4 2024 03:11 PM    HGB 8.0 2024 03:30 PM    HGB 12.7 10/08/2020 01:52 PM    HGB 12.6 2020 01:52 PM    HGB 12.4 03/10/2020 03:48 PM    HGB 11.5 08/15/2019 01:24 PM    HGB 10.6 2019 02:56 PM    HGB 11.3 2018 01:57 PM    HCT 37.7 2025 03:45 AM    HCT 45.7 2025 03:34 PM    HCT 42.1 2024 03:11 PM    HCT 28.5 2024 03:30 PM    HCT 36.2 10/08/2020 01:52 PM    HCT 36.9 2020 01:52 PM    HCT 36.0 03/10/2020 03:48 PM    HCT 36.0 08/15/2019 01:24 PM    HCT 34.6 2019 02:56 PM    HCT 35.2

## 2025-03-28 NOTE — CARE COORDINATION
Care Management Progress Note    Reason for Admission:   Fibroid, uterine [D25.9]  Menorrhagia [N92.0]  Fibroids [D21.9]  Procedure(s) (LRB):  TOTAL ABDOMINAL HYSTERECTOMY, BILATERAL SALPINGECTOMY (N/A)  1 Day Post-Op    Patient Admission Status: Inpatient  RUR:  5%  Hospitalization in the last 30 days (Readmission):  No        CM reviewed EMR.    Transition Plan of Care:  OB/Gyn following for medical management - pt is s/p total abd hysterectomy, mallory salpingectomy on 3/27  Plan is for pt to return home   Outpatient follow up  Pt will arrange her own transport home    CM will continue to follow pt from afar and will assist with any discharge needs.  Donnell

## 2025-03-29 PROBLEM — Z90.49 S/P LAPAROSCOPIC CHOLECYSTECTOMY: Status: RESOLVED | Noted: 2017-04-18 | Resolved: 2025-03-29

## 2025-03-29 PROBLEM — E66.01 OBESITY, MORBID, BMI 40.0-49.9: Status: ACTIVE | Noted: 2017-03-27

## 2025-03-29 PROCEDURE — 51798 US URINE CAPACITY MEASURE: CPT

## 2025-03-29 PROCEDURE — 1100000000 HC RM PRIVATE

## 2025-03-29 PROCEDURE — 6370000000 HC RX 637 (ALT 250 FOR IP): Performed by: OBSTETRICS & GYNECOLOGY

## 2025-03-29 PROCEDURE — 2500000003 HC RX 250 WO HCPCS: Performed by: OBSTETRICS & GYNECOLOGY

## 2025-03-29 PROCEDURE — 94761 N-INVAS EAR/PLS OXIMETRY MLT: CPT

## 2025-03-29 PROCEDURE — 6360000002 HC RX W HCPCS: Performed by: OBSTETRICS & GYNECOLOGY

## 2025-03-29 RX ADMIN — DOCUSATE SODIUM 100 MG: 100 CAPSULE, LIQUID FILLED ORAL at 22:18

## 2025-03-29 RX ADMIN — BUSPIRONE HYDROCHLORIDE 5 MG: 5 TABLET ORAL at 22:18

## 2025-03-29 RX ADMIN — ENOXAPARIN SODIUM 30 MG: 100 INJECTION SUBCUTANEOUS at 09:20

## 2025-03-29 RX ADMIN — ARIPIPRAZOLE 20 MG: 5 TABLET ORAL at 22:18

## 2025-03-29 RX ADMIN — BUSPIRONE HYDROCHLORIDE 5 MG: 5 TABLET ORAL at 09:19

## 2025-03-29 RX ADMIN — ENOXAPARIN SODIUM 30 MG: 100 INJECTION SUBCUTANEOUS at 22:18

## 2025-03-29 RX ADMIN — SODIUM CHLORIDE, PRESERVATIVE FREE 10 ML: 5 INJECTION INTRAVENOUS at 09:21

## 2025-03-29 RX ADMIN — IBUPROFEN 800 MG: 200 TABLET, FILM COATED ORAL at 09:19

## 2025-03-29 RX ADMIN — IBUPROFEN 800 MG: 200 TABLET, FILM COATED ORAL at 17:28

## 2025-03-29 RX ADMIN — DOCUSATE SODIUM 100 MG: 100 CAPSULE, LIQUID FILLED ORAL at 09:19

## 2025-03-29 RX ADMIN — IBUPROFEN 800 MG: 200 TABLET, FILM COATED ORAL at 00:26

## 2025-03-29 RX ADMIN — VORTIOXETINE 20 MG: 10 TABLET, FILM COATED ORAL at 09:19

## 2025-03-29 RX ADMIN — SODIUM CHLORIDE, PRESERVATIVE FREE 10 ML: 5 INJECTION INTRAVENOUS at 22:57

## 2025-03-29 RX ADMIN — TRAZODONE HYDROCHLORIDE 50 MG: 50 TABLET ORAL at 22:18

## 2025-03-29 ASSESSMENT — PAIN SCALES - GENERAL
PAINLEVEL_OUTOF10: 0
PAINLEVEL_OUTOF10: 3
PAINLEVEL_OUTOF10: 0
PAINLEVEL_OUTOF10: 4

## 2025-03-29 ASSESSMENT — PAIN DESCRIPTION - DESCRIPTORS: DESCRIPTORS: ACHING

## 2025-03-29 ASSESSMENT — PAIN DESCRIPTION - LOCATION
LOCATION: ABDOMEN
LOCATION: ABDOMEN

## 2025-03-29 ASSESSMENT — PAIN DESCRIPTION - ORIENTATION: ORIENTATION: MID

## 2025-03-29 NOTE — PROGRESS NOTES
Gynecology Progress Note    Patient doing well post-op day 3 from Procedure(s):  TOTAL ABDOMINAL HYSTERECTOMY, BILATERAL SALPINGECTOMY without significant complaints.  Pain controlled on current medication.  Patient is  passing flatus.  Patient was able to void but could not get out of the bed in time and it went on the chux and was not measurable.     Vitals:  Blood pressure 119/82, pulse 86, temperature 98.1 °F (36.7 °C), resp. rate 16, height 1.702 m (5' 7\"), weight (!) 136.5 kg (301 lb), SpO2 92%.  Temp (24hrs), Av.4 °F (36.9 °C), Min:98.1 °F (36.7 °C), Max:99.1 °F (37.3 °C)        Exam:  Patient without distress.               Abdomen soft,  nontender.                 Incision dry and clean without erythema.               Lower extremities are negative for swelling, cords, or tenderness.    Labs: No results found for this or any previous visit (from the past 24 hours).    Assessment and Plan:    Principal Problem:    Fibroids  Active Problems:    Obesity, morbid, BMI 40.0-49.9    MS (multiple sclerosis) (HCC)    Fibroid, uterine    Menorrhagia  Resolved Problems:    * No resolved hospital problems. *     Patient appears to be having uncomplicated post Procedure(s):  TOTAL ABDOMINAL HYSTERECTOMY, BILATERAL SALPINGECTOMY course.  Continue routine post-op care.      Will use Purewick while in bed  Continue PT/OT eval   May need to DC Monday to rehab due to decreased mobility

## 2025-03-30 PROCEDURE — 2500000003 HC RX 250 WO HCPCS: Performed by: OBSTETRICS & GYNECOLOGY

## 2025-03-30 PROCEDURE — 6370000000 HC RX 637 (ALT 250 FOR IP): Performed by: OBSTETRICS & GYNECOLOGY

## 2025-03-30 PROCEDURE — 51798 US URINE CAPACITY MEASURE: CPT

## 2025-03-30 PROCEDURE — 1100000000 HC RM PRIVATE

## 2025-03-30 PROCEDURE — 94761 N-INVAS EAR/PLS OXIMETRY MLT: CPT

## 2025-03-30 PROCEDURE — 6360000002 HC RX W HCPCS: Performed by: OBSTETRICS & GYNECOLOGY

## 2025-03-30 RX ADMIN — ENOXAPARIN SODIUM 30 MG: 100 INJECTION SUBCUTANEOUS at 08:32

## 2025-03-30 RX ADMIN — ARIPIPRAZOLE 20 MG: 5 TABLET ORAL at 20:22

## 2025-03-30 RX ADMIN — BUSPIRONE HYDROCHLORIDE 5 MG: 5 TABLET ORAL at 08:32

## 2025-03-30 RX ADMIN — TRAZODONE HYDROCHLORIDE 50 MG: 50 TABLET ORAL at 20:21

## 2025-03-30 RX ADMIN — DOCUSATE SODIUM 100 MG: 100 CAPSULE, LIQUID FILLED ORAL at 08:32

## 2025-03-30 RX ADMIN — IBUPROFEN 800 MG: 200 TABLET, FILM COATED ORAL at 16:42

## 2025-03-30 RX ADMIN — DOCUSATE SODIUM 100 MG: 100 CAPSULE, LIQUID FILLED ORAL at 20:21

## 2025-03-30 RX ADMIN — SODIUM CHLORIDE, PRESERVATIVE FREE 10 ML: 5 INJECTION INTRAVENOUS at 20:23

## 2025-03-30 RX ADMIN — VORTIOXETINE 20 MG: 10 TABLET, FILM COATED ORAL at 08:32

## 2025-03-30 RX ADMIN — IBUPROFEN 800 MG: 200 TABLET, FILM COATED ORAL at 01:44

## 2025-03-30 RX ADMIN — IBUPROFEN 800 MG: 200 TABLET, FILM COATED ORAL at 08:32

## 2025-03-30 RX ADMIN — ENOXAPARIN SODIUM 30 MG: 100 INJECTION SUBCUTANEOUS at 20:22

## 2025-03-30 RX ADMIN — SODIUM CHLORIDE, PRESERVATIVE FREE 10 ML: 5 INJECTION INTRAVENOUS at 08:35

## 2025-03-30 RX ADMIN — BUSPIRONE HYDROCHLORIDE 5 MG: 5 TABLET ORAL at 20:22

## 2025-03-30 ASSESSMENT — PAIN - FUNCTIONAL ASSESSMENT: PAIN_FUNCTIONAL_ASSESSMENT: ACTIVITIES ARE NOT PREVENTED

## 2025-03-30 ASSESSMENT — PAIN SCALES - GENERAL
PAINLEVEL_OUTOF10: 4
PAINLEVEL_OUTOF10: 0

## 2025-03-30 ASSESSMENT — PAIN DESCRIPTION - LOCATION: LOCATION: ABDOMEN

## 2025-03-30 ASSESSMENT — PAIN DESCRIPTION - ORIENTATION: ORIENTATION: MID

## 2025-03-30 ASSESSMENT — PAIN DESCRIPTION - DESCRIPTORS: DESCRIPTORS: ACHING

## 2025-03-30 NOTE — PROGRESS NOTES
Gynecology Progress Note    Patient doing well post-op day 5 from Procedure(s):  TOTAL ABDOMINAL HYSTERECTOMY, BILATERAL SALPINGECTOMY without significant complaints.  Pain controlled on current medication.  Voiding without difficulty. Patient is  passing flatus.    Vitals:  Blood pressure 116/77, pulse 89, temperature 97.9 °F (36.6 °C), temperature source Oral, resp. rate 18, height 1.702 m (5' 7\"), weight (!) 136.5 kg (301 lb), SpO2 93%.  Temp (24hrs), Av.9 °F (36.6 °C), Min:97.7 °F (36.5 °C), Max:98.1 °F (36.7 °C)        Exam:  Patient without distress.               Abdomen soft,  nontender.                 Incision dry and clean without erythema.               Lower extremities are negative for swelling, cords, or tenderness.    Labs: No results found for this or any previous visit (from the past 24 hours).    Assessment and Plan:    Principal Problem:    Fibroids  Active Problems:    Obesity, morbid, BMI 40.0-49.9    MS (multiple sclerosis) (HCC)    Fibroid, uterine    Menorrhagia  Resolved Problems:    * No resolved hospital problems. *     Patient appears to be having uncomplicated post Procedure(s):  TOTAL ABDOMINAL HYSTERECTOMY, BILATERAL SALPINGECTOMY course.  Continue routine post-op care.

## 2025-03-31 PROCEDURE — 6360000002 HC RX W HCPCS: Performed by: OBSTETRICS & GYNECOLOGY

## 2025-03-31 PROCEDURE — 97530 THERAPEUTIC ACTIVITIES: CPT

## 2025-03-31 PROCEDURE — 97535 SELF CARE MNGMENT TRAINING: CPT

## 2025-03-31 PROCEDURE — 6370000000 HC RX 637 (ALT 250 FOR IP): Performed by: OBSTETRICS & GYNECOLOGY

## 2025-03-31 PROCEDURE — 94761 N-INVAS EAR/PLS OXIMETRY MLT: CPT

## 2025-03-31 PROCEDURE — 97116 GAIT TRAINING THERAPY: CPT

## 2025-03-31 PROCEDURE — 1100000000 HC RM PRIVATE

## 2025-03-31 PROCEDURE — 2500000003 HC RX 250 WO HCPCS: Performed by: OBSTETRICS & GYNECOLOGY

## 2025-03-31 RX ORDER — BISACODYL 10 MG
10 SUPPOSITORY, RECTAL RECTAL DAILY PRN
Status: DISCONTINUED | OUTPATIENT
Start: 2025-03-31 | End: 2025-04-02 | Stop reason: HOSPADM

## 2025-03-31 RX ORDER — POLYETHYLENE GLYCOL 3350 17 G/17G
17 POWDER, FOR SOLUTION ORAL DAILY
Status: DISCONTINUED | OUTPATIENT
Start: 2025-03-31 | End: 2025-04-02 | Stop reason: HOSPADM

## 2025-03-31 RX ORDER — SENNA AND DOCUSATE SODIUM 50; 8.6 MG/1; MG/1
2 TABLET, FILM COATED ORAL DAILY
Status: DISCONTINUED | OUTPATIENT
Start: 2025-03-31 | End: 2025-04-02 | Stop reason: HOSPADM

## 2025-03-31 RX ADMIN — ARIPIPRAZOLE 20 MG: 5 TABLET ORAL at 21:24

## 2025-03-31 RX ADMIN — ENOXAPARIN SODIUM 30 MG: 100 INJECTION SUBCUTANEOUS at 21:25

## 2025-03-31 RX ADMIN — DOCUSATE SODIUM 100 MG: 100 CAPSULE, LIQUID FILLED ORAL at 09:54

## 2025-03-31 RX ADMIN — VORTIOXETINE 20 MG: 10 TABLET, FILM COATED ORAL at 09:52

## 2025-03-31 RX ADMIN — IBUPROFEN 800 MG: 200 TABLET, FILM COATED ORAL at 00:46

## 2025-03-31 RX ADMIN — SODIUM CHLORIDE, PRESERVATIVE FREE 10 ML: 5 INJECTION INTRAVENOUS at 21:30

## 2025-03-31 RX ADMIN — IBUPROFEN 800 MG: 200 TABLET, FILM COATED ORAL at 09:53

## 2025-03-31 RX ADMIN — BUSPIRONE HYDROCHLORIDE 5 MG: 5 TABLET ORAL at 09:54

## 2025-03-31 RX ADMIN — SENNOSIDES, DOCUSATE SODIUM 2 TABLET: 50; 8.6 TABLET, FILM COATED ORAL at 16:58

## 2025-03-31 RX ADMIN — BUSPIRONE HYDROCHLORIDE 5 MG: 5 TABLET ORAL at 21:24

## 2025-03-31 RX ADMIN — POLYETHYLENE GLYCOL 3350 17 G: 17 POWDER, FOR SOLUTION ORAL at 16:59

## 2025-03-31 RX ADMIN — ENOXAPARIN SODIUM 30 MG: 100 INJECTION SUBCUTANEOUS at 09:54

## 2025-03-31 RX ADMIN — SODIUM CHLORIDE, PRESERVATIVE FREE 10 ML: 5 INJECTION INTRAVENOUS at 09:55

## 2025-03-31 RX ADMIN — IBUPROFEN 800 MG: 200 TABLET, FILM COATED ORAL at 16:34

## 2025-03-31 RX ADMIN — HYDROXYZINE HYDROCHLORIDE 50 MG: 25 TABLET, FILM COATED ORAL at 00:53

## 2025-03-31 RX ADMIN — TRAZODONE HYDROCHLORIDE 50 MG: 50 TABLET ORAL at 21:23

## 2025-03-31 NOTE — CARE COORDINATION
Care Management Progress Note    Reason for Admission:   Fibroid, uterine [D25.9]  Menorrhagia [N92.0]  Fibroids [D21.9]  Procedure(s) (LRB):  TOTAL ABDOMINAL HYSTERECTOMY, BILATERAL SALPINGECTOMY (N/A)  4 Days Post-Op    Patient Admission Status: Inpatient  RUR:  5%; LOS 4 days  Hospitalization in the last 30 days (Readmission):  No        CM reviewed EMR for clinical updates.     Transition Plan of Care:  OB/Gyn following for medical management - pt is s/p total abd hysterectomy, mallory salpingectomy on 3/27  PT/OT evals complete; pt ambulated 10 feet on 3/28 and pt's progress will continue to be monitored  On 3/31, pt ambulated 40 feet and IPR was recommended.    CM sent out referrals to [facility]PR and SNFs for review  [employer] auth will be needed.  Outpatient follow up  Mode of transport to be determined     CM will continue to follow pt for definitive discharge plan ie [facility] vs SNF  [place]

## 2025-03-31 NOTE — PROGRESS NOTES
Gynecology Post Operative Note    Pat GARCIA Nessa    Post-op day 4 from Procedure(s):  TOTAL ABDOMINAL HYSTERECTOMY, BILATERAL SALPINGECTOMY  She is without significant complaints. Pain controlled on current medication.  Voiding without difficulty. Patient is passing flatus. She is is tolerating her diet.        Vitals:  /85   Pulse 85   Temp 97.9 °F (36.6 °C)   Resp 17   Ht 1.702 m (5' 7\")   Wt (!) 136.5 kg (301 lb)   LMP  (LMP Unknown)   SpO2 96%   BMI 47.14 kg/m²   Temp (24hrs), Av °F (36.7 °C), Min:97.7 °F (36.5 °C), Max:98.2 °F (36.8 °C)      Last 24hr Input/Output:  No intake or output data in the 24 hours ending 25 162       Exam:  Patient without distress.                  Abdomen soft, bowel sounds present, expected tenderness.                Incision dry and clean without erythema.              Lower extremities are negative for swelling, cords, or tenderness.    Labs:   Lab Results   Component Value Date/Time    WBC 8.4 2025 03:45 AM    WBC 6.0 2025 03:34 PM    WBC 7.8 2024 03:11 PM    WBC 7.8 2024 03:30 PM    WBC 6.3 10/08/2020 01:52 PM    WBC 6.4 2020 01:52 PM    WBC 6.7 03/10/2020 03:48 PM    WBC 4.6 08/15/2019 01:24 PM    WBC 8.3 2019 02:56 PM    WBC Normal 2018 01:57 PM    WBC 7.8 2018 01:57 PM    HGB 12.5 2025 03:45 AM    HGB 15.5 2025 03:34 PM    HGB 13.4 2024 03:11 PM    HGB 8.0 2024 03:30 PM    HGB 12.7 10/08/2020 01:52 PM    HGB 12.6 2020 01:52 PM    HGB 12.4 03/10/2020 03:48 PM    HGB 11.5 08/15/2019 01:24 PM    HGB 10.6 2019 02:56 PM    HGB 11.3 2018 01:57 PM    HCT 37.7 2025 03:45 AM    HCT 45.7 2025 03:34 PM    HCT 42.1 2024 03:11 PM    HCT 28.5 2024 03:30 PM    HCT 36.2 10/08/2020 01:52 PM    HCT 36.9 2020 01:52 PM    HCT 36.0 03/10/2020 03:48 PM    HCT 36.0 08/15/2019 01:24 PM    HCT 34.6 2019 02:56 PM    HCT 35.2 2018 01:57 PM    PLT

## 2025-04-01 PROCEDURE — 2500000003 HC RX 250 WO HCPCS: Performed by: OBSTETRICS & GYNECOLOGY

## 2025-04-01 PROCEDURE — 6370000000 HC RX 637 (ALT 250 FOR IP): Performed by: OBSTETRICS & GYNECOLOGY

## 2025-04-01 PROCEDURE — 97116 GAIT TRAINING THERAPY: CPT

## 2025-04-01 PROCEDURE — 6360000002 HC RX W HCPCS: Performed by: OBSTETRICS & GYNECOLOGY

## 2025-04-01 PROCEDURE — 97535 SELF CARE MNGMENT TRAINING: CPT

## 2025-04-01 PROCEDURE — 94761 N-INVAS EAR/PLS OXIMETRY MLT: CPT

## 2025-04-01 PROCEDURE — 97110 THERAPEUTIC EXERCISES: CPT

## 2025-04-01 PROCEDURE — 1100000000 HC RM PRIVATE

## 2025-04-01 RX ADMIN — TRAZODONE HYDROCHLORIDE 50 MG: 50 TABLET ORAL at 22:45

## 2025-04-01 RX ADMIN — IBUPROFEN 800 MG: 200 TABLET, FILM COATED ORAL at 11:00

## 2025-04-01 RX ADMIN — SENNOSIDES, DOCUSATE SODIUM 2 TABLET: 50; 8.6 TABLET, FILM COATED ORAL at 11:01

## 2025-04-01 RX ADMIN — SODIUM CHLORIDE, PRESERVATIVE FREE 10 ML: 5 INJECTION INTRAVENOUS at 11:02

## 2025-04-01 RX ADMIN — IBUPROFEN 800 MG: 200 TABLET, FILM COATED ORAL at 16:57

## 2025-04-01 RX ADMIN — SODIUM CHLORIDE, PRESERVATIVE FREE 10 ML: 5 INJECTION INTRAVENOUS at 22:44

## 2025-04-01 RX ADMIN — BUSPIRONE HYDROCHLORIDE 5 MG: 5 TABLET ORAL at 10:59

## 2025-04-01 RX ADMIN — IBUPROFEN 800 MG: 200 TABLET, FILM COATED ORAL at 01:09

## 2025-04-01 RX ADMIN — VORTIOXETINE 20 MG: 10 TABLET, FILM COATED ORAL at 11:00

## 2025-04-01 RX ADMIN — ARIPIPRAZOLE 20 MG: 5 TABLET ORAL at 22:44

## 2025-04-01 RX ADMIN — ENOXAPARIN SODIUM 30 MG: 100 INJECTION SUBCUTANEOUS at 22:44

## 2025-04-01 RX ADMIN — ENOXAPARIN SODIUM 30 MG: 100 INJECTION SUBCUTANEOUS at 11:02

## 2025-04-01 RX ADMIN — BUSPIRONE HYDROCHLORIDE 5 MG: 5 TABLET ORAL at 22:45

## 2025-04-01 ASSESSMENT — PAIN SCALES - GENERAL
PAINLEVEL_OUTOF10: 0

## 2025-04-01 NOTE — CARE COORDINATION
Care Management Progress Note    Reason for Admission:   Fibroid, uterine [D25.9]  Menorrhagia [N92.0]  Fibroids [D21.9]  Procedure(s) (LRB):  TOTAL ABDOMINAL HYSTERECTOMY, BILATERAL SALPINGECTOMY (N/A)  5 Days Post-Op    Patient Admission Status: Inpatient  RUR:  5%; LOS 5 days  Hospitalization in the last 30 days (Readmission):  No        Transition Plan of Care:  OB/Gyn following for medical management - pt is s/p total abd hysterectomy, mallory salpingectomy on 3/27  SNF - accepted by [facility]  [employer] auth was initiated by [facility] on 4/1  Outpatient follow up  Pt's father will transport pt      CM will continue to follow pt for SNF  [place]

## 2025-04-01 NOTE — PROGRESS NOTES
Gynecology Progress Note    Patient doing well post-op day 5 from Procedure(s):  TOTAL ABDOMINAL HYSTERECTOMY, BILATERAL SALPINGECTOMY without significant complaints.  Pain controlled on current medication.  Voiding without difficulty. Patient is  passing flatus.    Vitals:  Blood pressure 120/85, pulse 81, temperature 98.1 °F (36.7 °C), resp. rate 16, height 1.702 m (5' 7\"), weight (!) 136.5 kg (301 lb), SpO2 98%.  Temp (24hrs), Av.8 °F (36.6 °C), Min:97.3 °F (36.3 °C), Max:98.1 °F (36.7 °C)        Exam:  Patient without distress.               Abdomen soft,  nontender.                 Incision dry and clean without erythema.               Lower extremities are negative for swelling, cords, or tenderness.    Labs: No results found for this or any previous visit (from the past 24 hours).    Assessment and Plan:  Patient appears to be having uncomplicated post Procedure(s):  TOTAL ABDOMINAL HYSTERECTOMY, BILATERAL SALPINGECTOMY course.  Continue routine post-op care.   Trying to arrange for DC with continued post op care at rehab.  Working with PT/OT

## 2025-04-01 NOTE — DISCHARGE INSTRUCTIONS
POSTOPERATIVE INSTRUCTIONS        ABDOMINAL HYSTERECTOMY    Now that you are preparing for discharge from the hospital and going home, there are several things to consider:       1. Absolutely NO intercourse, douching or tampon use until cleared by your doctor.     This precaution helps reduce the possibility of infection.       2. You can expect some bleeding and bloody vaginal discharge for 2-6 weeks,    which should not be excessive.  Also bleeding may stop completely or reoccur,    depending on activity.       3. You should try to rest 2-4 hours daily at least twice a day for the first 2 weeks.       4. You may experience pelvic soreness and you can expect your incision to show    good signs of healing, such as slight swelling and crusty scab.  This is normal.        5. Do not lift anything heavier than 5 pounds or do any heavy housework until    cleared by your doctor.       6. You may use the stairs, but limit excursions at first and then progress gradually.       7. You may begin driving a car 3 weeks after surgery.        8. You may ride in a car within 2 weeks after discharge.        9. You may take tub baths or showers, but no bubble baths.  Be very careful not to    slip in the tub, though.      10.   Notify us if you experience:     a.  heavy vaginal bleeding (soak at least one pad an hour) or abnormally          foul smelling vaginal discharge (you may experience some vaginal                                  odor)    b.  chills or fever greater than 101° on two occasions, taking your                                  temperature every 4 hours.      c.  severe, sharp and/or persistent pelvic or vaginal pain (some mild                                  soreness and slight discomfort is expected)    d.  incision pain or signs of infection, such as extreme redness, swelling          and liquid discharge along the incision line.     11. Please call the office today at [phone] to schedule an

## 2025-04-01 NOTE — PLAN OF CARE
Problem: Occupational Therapy - Adult  Goal: By Discharge: Performs self-care activities at highest level of function for planned discharge setting.  See evaluation for individualized goals.  Description: FUNCTIONAL STATUS PRIOR TO ADMISSION:  Patient lives with her father and is Mod I with rollator use at baseline. Her father or a care aide completes IADLs. Patient has a history of MS, she attends a day program a couple of days per week. She has a raised toilet seat, grab bars, and shower chair. She reports using a w/c for longer distances    Occupational Therapy Goals:  Initiated 3/28/2025  1.  Patient will perform lower body dressing with CGA within 7 day(s).  2.  Patient will perform grooming in standing, sitting PRN with Modified [place] within 7 day(s).  3.  Patient will perform bathing with CGA within 7 day(s).  4.  Patient will perform toilet transfers with Modified [place]  within 7 day(s).  5.  Patient will perform all aspects of toileting with CGA within 7 day(s).  6.  Patient will participate in upper extremity therapeutic exercise/activities with [place] for 5 minutes within 7 day(s).    7.  Patient will utilize energy conservation techniques during functional activities with verbal cues within 7 day(s).    Outcome: Progressing   OCCUPATIONAL THERAPY TREATMENT  Patient: Pat Szymanski (44 y.o. female)  Date: 3/31/2025  Primary Diagnosis: Fibroid, uterine [D25.9]  Menorrhagia [N92.0]  Fibroids [D21.9]  Procedure(s) (LRB):  TOTAL ABDOMINAL HYSTERECTOMY, BILATERAL SALPINGECTOMY (N/A) 4 Days Post-Op   Precautions:                  Chart, occupational therapy assessment, plan of care, and goals were reviewed.    ASSESSMENT  Patient continues to benefit from skilled OT services and is slowly progressing towards goals. Nursing cleared for therapy and patient received in bed with multiple family members present.  She was highly motivated for therapy and reports ambulation to bathroom with staff 
  Problem: Occupational Therapy - Adult  Goal: By Discharge: Performs self-care activities at highest level of function for planned discharge setting.  See evaluation for individualized goals.  Description: FUNCTIONAL STATUS PRIOR TO ADMISSION:  Patient lives with her father and is Mod I with rollator use at baseline. Her father or a care aide completes IADLs. Patient has a history of MS, she attends a day program a couple of days per week. She has a raised toilet seat, grab bars, and shower chair. She reports using a w/c for longer distances    Occupational Therapy Goals:  Initiated 3/28/2025  1.  Patient will perform lower body dressing with CGA within 7 day(s).  2.  Patient will perform grooming in standing, sitting PRN with Modified [place] within 7 day(s).  3.  Patient will perform bathing with CGA within 7 day(s).  4.  Patient will perform toilet transfers with Modified [place]  within 7 day(s).  5.  Patient will perform all aspects of toileting with CGA within 7 day(s).  6.  Patient will participate in upper extremity therapeutic exercise/activities with [place] for 5 minutes within 7 day(s).    7.  Patient will utilize energy conservation techniques during functional activities with verbal cues within 7 day(s).    Outcome: Progressing   OCCUPATIONAL THERAPY TREATMENT  Patient: Pat Szymanski (44 y.o. female)  Date: 4/1/2025  Primary Diagnosis: Fibroid, uterine [D25.9]  Menorrhagia [N92.0]  Fibroids [D21.9]  Procedure(s) (LRB):  TOTAL ABDOMINAL HYSTERECTOMY, BILATERAL SALPINGECTOMY (N/A) 5 Days Post-Op   Precautions:                  Chart, occupational therapy assessment, plan of care, and goals were reviewed.    ASSESSMENT  Patient continues to benefit from skilled OT services and is progressing towards goals. Pt ambulated to restroom, stood to brush her teeth, uses rollator. She ambulated to sink in her room and able to reach for ACTIVITIES OF DAILY LIVING items without difficulty. She 
  Problem: Occupational Therapy - Adult  Goal: By Discharge: Performs self-care activities at highest level of function for planned discharge setting.  See evaluation for individualized goals.  Description: FUNCTIONAL STATUS PRIOR TO ADMISSION:  Patient lives with her father and is Mod I with rollator use at baseline. Her father or a care aide completes IADLs. Patient has a history of MS, she attends a day program a couple of days per week. She has a raised toilet seat, grab bars, and shower chair. She reports using a w/c for longer distances    Occupational Therapy Goals:  Initiated 3/28/2025  1.  Patient will perform lower body dressing with CGA within 7 day(s).  2.  Patient will perform grooming in standing, sitting PRN with Modified [place] within 7 day(s).  3.  Patient will perform bathing with CGA within 7 day(s).  4.  Patient will perform toilet transfers with Modified [place]  within 7 day(s).  5.  Patient will perform all aspects of toileting with CGA within 7 day(s).  6.  Patient will participate in upper extremity therapeutic exercise/activities with [place] for 5 minutes within 7 day(s).    7.  Patient will utilize energy conservation techniques during functional activities with verbal cues within 7 day(s).    Outcome: Progressing    OCCUPATIONAL THERAPY EVALUATION    Patient: Pat Szymanski (44 y.o. female)  Date: 3/28/2025  Primary Diagnosis: Fibroid, uterine [D25.9]  Menorrhagia [N92.0]  Fibroids [D21.9]  Procedure(s) (LRB):  TOTAL ABDOMINAL HYSTERECTOMY, BILATERAL SALPINGECTOMY (N/A) 1 Day Post-Op     Precautions:                    ASSESSMENT :  The patient is limited by decreased functional mobility, independence in ADLs, ROM, strength, activity tolerance, coordination, and standing balance after presenting with uterine fibroids and menorrhagia, patient is now s/p total hysterectomy. She has a history of MS and at baseline uses a rollator and is Mod I for ADLs. Patient father and a 
  Problem: Physical Therapy - Adult  Goal: By Discharge: Performs mobility at highest level of function for planned discharge setting.  See evaluation for individualized goals.  Description: FUNCTIONAL STATUS PRIOR TO ADMISSION: Patient was modified independent using a rollator for functional mobility.    HOME SUPPORT PRIOR TO ADMISSION: Patient's father is caregiver, supportive family.    Physical Therapy Goals  Initiated 3/28/2025  1.  Patient will move from supine to sit and sit to supine in bed with supervision/set-up within 7 day(s).    2.  Patient will perform sit to stand with supervision/set-up within 7 day(s).  3.  Patient will transfer from bed to chair and chair to bed with supervision/set-up using the least restrictive device within 7 day(s).  4.  Patient will ambulate with contact guard assist for 50 feet with the least restrictive device within 7 day(s).     Outcome: Progressing   PHYSICAL THERAPY EVALUATION    Patient: Pat Szymanski (44 y.o. female)  Date: 3/28/2025  Primary Diagnosis: Fibroid, uterine [D25.9]  Menorrhagia [N92.0]  Fibroids [D21.9]  Procedure(s) (LRB):  TOTAL ABDOMINAL HYSTERECTOMY, BILATERAL SALPINGECTOMY (N/A) 1 Day Post-Op   Precautions:              ASSESSMENT :   DEFICITS/IMPAIRMENTS:   The patient is limited by decreased functional mobility, strength, coordination, balance following admission for total hysterectomy.  Patient with previous history of MS and asthma.  Patient at baseline uses a rollator for household ambulation and RW and wheelchair for outside the home.    Based on the impairments listed above patient today received sitting up in chair following sitting after OT session.  Patient able to ambulate short distance with rollator and returned to supine due to fatigue.  Patient with baseline Left LE weakness that is now exacerbated due to surgery..    Patient will benefit from skilled intervention to address the above impairments.    Functional Outcome Measure:  The 
  Problem: Physical Therapy - Adult  Goal: By Discharge: Performs mobility at highest level of function for planned discharge setting.  See evaluation for individualized goals.  Description: FUNCTIONAL STATUS PRIOR TO ADMISSION: Patient was modified independent using a rollator for functional mobility.    HOME SUPPORT PRIOR TO ADMISSION: Patient's father is caregiver, supportive family.    Physical Therapy Goals  Initiated 3/28/2025  1.  Patient will move from supine to sit and sit to supine in bed with supervision/set-up within 7 day(s).    2.  Patient will perform sit to stand with supervision/set-up within 7 day(s).  3.  Patient will transfer from bed to chair and chair to bed with supervision/set-up using the least restrictive device within 7 day(s).  4.  Patient will ambulate with contact guard assist for 50 feet with the least restrictive device within 7 day(s).     Outcome: Progressing   PHYSICAL THERAPY TREATMENT    Patient: Pat Szymanski (44 y.o. female)  Date: 4/1/2025  Diagnosis: Fibroid, uterine [D25.9]  Menorrhagia [N92.0]  Fibroids [D21.9] Fibroids  Procedure(s) (LRB):  TOTAL ABDOMINAL HYSTERECTOMY, BILATERAL SALPINGECTOMY (N/A) 5 Days Post-Op  Precautions:              ASSESSMENT:  Patient continues to benefit from skilled PT services and is progressing towards goals.  Pt received supine in bed. Pt with hx of MS.  Motivated to participate.  Performed BLE ex of ankle pumps and heel slides noting LLE weakness, yet functional.  BLE edema noted as well.  Encouraged BLE ex while in bed to improve circulation and strength.  Sup to sit with SBA.  Good sitting balance on EOB.  LAQ tolerated well.  Performed toe tapping coordination ex.  L slower then R.  Sit<>stand with CGA with rollator.  Gait of 75' is slow, yet stable with rollator with CGA.   Mod I with sit to supine and left in NAD.         PLAN:  Patient continues to benefit from skilled intervention to address the above impairments.  Continue 
  Problem: Safety - Adult  Goal: Free from fall injury  3/27/2025 2338 by Maureen Hyman RN  Outcome: Progressing  3/27/2025 1624 by Xiao Carias RN  Outcome: Progressing     Problem: Skin/Tissue Integrity  Goal: Skin integrity remains intact  Description: 1.  Monitor for areas of redness and/or skin breakdown  2.  Assess vascular access sites hourly  3.  Every 4-6 hours minimum:  Change oxygen saturation probe site  4.  Every 4-6 hours:  If on nasal continuous positive airway pressure, respiratory therapy assess nares and determine need for appliance change or resting period  3/27/2025 2338 by Maureen Hyman RN  Outcome: Progressing  3/27/2025 1624 by Xiao Carias RN  Outcome: Progressing  Flowsheets  Taken 3/27/2025 1618  Skin Integrity Remains Intact: Monitor for areas of redness and/or skin breakdown  Taken 3/27/2025 1558  Skin Integrity Remains Intact: Monitor for areas of redness and/or skin breakdown     Problem: Pain  Goal: Verbalizes/displays adequate comfort level or baseline comfort level  3/27/2025 2338 by Maureen Hyman RN  Outcome: Progressing  3/27/2025 1624 by Xiao Carias RN  Outcome: Progressing  Flowsheets (Taken 3/27/2025 1554)  Verbalizes/displays adequate comfort level or baseline comfort level: Encourage patient to monitor pain and request assistance     Problem: ABCDS Injury Assessment  Goal: Absence of physical injury  3/27/2025 2338 by Maureen Hyman RN  Outcome: Progressing  3/27/2025 1624 by Xiao Carias RN  Outcome: Progressing  Flowsheets (Taken 3/27/2025 1618)  Absence of Physical Injury: Implement safety measures based on patient assessment     
  Problem: Safety - Adult  Goal: Free from fall injury  Outcome: Progressing     Problem: Skin/Tissue Integrity  Goal: Skin integrity remains intact  Description: 1.  Monitor for areas of redness and/or skin breakdown  2.  Assess vascular access sites hourly  3.  Every 4-6 hours minimum:  Change oxygen saturation probe site  4.  Every 4-6 hours:  If on nasal continuous positive airway pressure, respiratory therapy assess nares and determine need for appliance change or resting period  Outcome: Progressing     Problem: Pain  Goal: Verbalizes/displays adequate comfort level or baseline comfort level  Outcome: Progressing     Problem: ABCDS Injury Assessment  Goal: Absence of physical injury  Outcome: Progressing     
  Problem: Safety - Adult  Goal: Free from fall injury  Outcome: Progressing     Problem: Skin/Tissue Integrity  Goal: Skin integrity remains intact  Description: 1.  Monitor for areas of redness and/or skin breakdown  2.  Assess vascular access sites hourly  3.  Every 4-6 hours minimum:  Change oxygen saturation probe site  4.  Every 4-6 hours:  If on nasal continuous positive airway pressure, respiratory therapy assess nares and determine need for appliance change or resting period  Outcome: Progressing     Problem: Pain  Goal: Verbalizes/displays adequate comfort level or baseline comfort level  Outcome: Progressing     Problem: ABCDS Injury Assessment  Goal: Absence of physical injury  Outcome: Progressing     
  Problem: Safety - Adult  Goal: Free from fall injury  Outcome: Progressing     Problem: Skin/Tissue Integrity  Goal: Skin integrity remains intact  Description: 1.  Monitor for areas of redness and/or skin breakdown  2.  Assess vascular access sites hourly  3.  Every 4-6 hours minimum:  Change oxygen saturation probe site  4.  Every 4-6 hours:  If on nasal continuous positive airway pressure, respiratory therapy assess nares and determine need for appliance change or resting period  Outcome: Progressing  Flowsheets  Taken 3/27/2025 1618  Skin Integrity Remains Intact: Monitor for areas of redness and/or skin breakdown  Taken 3/27/2025 1558  Skin Integrity Remains Intact: Monitor for areas of redness and/or skin breakdown     Problem: Pain  Goal: Verbalizes/displays adequate comfort level or baseline comfort level  Outcome: Progressing  Flowsheets (Taken 3/27/2025 1557)  Verbalizes/displays adequate comfort level or baseline comfort level: Encourage patient to monitor pain and request assistance     Problem: ABCDS Injury Assessment  Goal: Absence of physical injury  Outcome: Progressing     
  Problem: Safety - Adult  Goal: Free from fall injury  Outcome: Progressing     Problem: Skin/Tissue Integrity  Goal: Skin integrity remains intact  Description: 1.  Monitor for areas of redness and/or skin breakdown  2.  Assess vascular access sites hourly  3.  Every 4-6 hours minimum:  Change oxygen saturation probe site  4.  Every 4-6 hours:  If on nasal continuous positive airway pressure, respiratory therapy assess nares and determine need for appliance change or resting period  Outcome: Progressing  Flowsheets  Taken 3/31/2025 1741  Skin Integrity Remains Intact: Monitor for areas of redness and/or skin breakdown  Taken 3/31/2025 0850  Skin Integrity Remains Intact: Monitor for areas of redness and/or skin breakdown     Problem: Pain  Goal: Verbalizes/displays adequate comfort level or baseline comfort level  Outcome: Progressing     Problem: ABCDS Injury Assessment  Goal: Absence of physical injury  Outcome: Progressing     Problem: Occupational Therapy - Adult  Goal: By Discharge: Performs self-care activities at highest level of function for planned discharge setting.  See evaluation for individualized goals.  Description: FUNCTIONAL STATUS PRIOR TO ADMISSION:  Patient lives with her father and is Mod I with rollator use at baseline. Her father or a care aide completes IADLs. Patient has a history of MS, she attends a day program a couple of days per week. She has a raised toilet seat, grab bars, and shower chair. She reports using a w/c for longer distances    Occupational Therapy Goals:  Initiated 3/28/2025  1.  Patient will perform lower body dressing with CGA within 7 day(s).  2.  Patient will perform grooming in standing, sitting PRN with Modified [place] within 7 day(s).  3.  Patient will perform bathing with CGA within 7 day(s).  4.  Patient will perform toilet transfers with Modified [place]  within 7 day(s).  5.  Patient will perform all aspects of toileting with CGA within 7 day(s).  6. 
  Problem: Safety - Adult  Goal: Free from fall injury  Outcome: Progressing    Problem: Skin/Tissue Integrity  Goal: Skin integrity remains intact  Description: 1.  Monitor for areas of redness and/or skin breakdown  2.  Assess vascular access sites hourly  3.  Every 4-6 hours minimum:  Change oxygen saturation probe site  4.  Every 4-6 hours:  If on nasal continuous positive airway pressure, respiratory therapy assess nares and determine need for appliance change or resting period  Outcome: Progressing    Problem: Pain  Goal: Verbalizes/displays adequate comfort level or baseline comfort level  Outcome: Progressing     Problem: ABCDS Injury Assessment  Goal: Absence of physical injury  Outcome: Progressing    
Remote device check.  Please see link for full device report.  Patient was informed of results and next follow up via mail.    
recommended toilet device: the bathroom with staff assist x1 using  gait belt and rolling walker.    Recommend for next PT session: sit to stand transfers and further progression of gait with existing device    Recommendation for discharge: (in order for the patient to meet his/her long term goals):   High intensity/comprehensive skilled physical therapy in a multidisciplinary setting as patient is working towards tolerating up to 3 hours of therapy/day 5-7x/week    Other factors to consider for discharge: high risk for falls and concern for safely navigating or managing the home environment    IF patient discharges home will need the following DME: continuing to assess with progress       SUBJECTIVE:   Patient stated, \"I would like to get up.\"    OBJECTIVE DATA SUMMARY:   Critical Behavior:     Cognition  Overall Cognitive Status: WNL    Functional Mobility Training:  Bed Mobility:  Bed Mobility Training  Bed Mobility Training: Yes  Supine to Sit: Stand by assistance (semi gramajo with HOB elevated)  Scooting: Stand by assistance  Transfers:  Transfer Training  Transfer Training: Yes  Sit to Stand: Contact guard assistance  Stand to Sit: Contact guard assistance  Bed to Chair: Contact guard assistance (rollator)  Balance:  Balance  Sitting: Intact  Standing: Impaired  Standing - Static: Constant support;Good;Fair  Standing - Dynamic: Constant support;Fair   Ambulation/Gait Training:     Gait  Overall Level of Assistance: Contact guard assistance  Distance (ft): 40 Feet  Interventions: Safety awareness training;Weight shifting training/pressure relief  Base of Support: Widened  Speed/Rylie: Slow  Gait Abnormalities: Antalgic;Decreased step clearance;Step to gait;Trunk sway increased        Neuro Re-Education:                    Pain Rating:  3/10   Pain Intervention(s):   patient medicated for pain prior to session    Activity Tolerance:   requires rest breaks    After treatment:   Patient left in no apparent

## 2025-04-02 VITALS
RESPIRATION RATE: 16 BRPM | DIASTOLIC BLOOD PRESSURE: 84 MMHG | WEIGHT: 293 LBS | BODY MASS INDEX: 45.99 KG/M2 | HEIGHT: 67 IN | HEART RATE: 80 BPM | SYSTOLIC BLOOD PRESSURE: 129 MMHG | OXYGEN SATURATION: 92 % | TEMPERATURE: 98.2 F

## 2025-04-02 PROCEDURE — 94761 N-INVAS EAR/PLS OXIMETRY MLT: CPT

## 2025-04-02 PROCEDURE — 6360000002 HC RX W HCPCS: Performed by: OBSTETRICS & GYNECOLOGY

## 2025-04-02 PROCEDURE — 2500000003 HC RX 250 WO HCPCS: Performed by: OBSTETRICS & GYNECOLOGY

## 2025-04-02 PROCEDURE — 6370000000 HC RX 637 (ALT 250 FOR IP): Performed by: OBSTETRICS & GYNECOLOGY

## 2025-04-02 RX ORDER — ENOXAPARIN SODIUM 100 MG/ML
30 INJECTION SUBCUTANEOUS 2 TIMES DAILY
Qty: 42 EACH | Refills: 0 | Status: SHIPPED
Start: 2025-04-02

## 2025-04-02 RX ORDER — MAGNESIUM HYDROXIDE/ALUMINUM HYDROXICE/SIMETHICONE 120; 1200; 1200 MG/30ML; MG/30ML; MG/30ML
30 SUSPENSION ORAL EVERY 6 HOURS PRN
Qty: 35 ML | Refills: 1 | Status: SHIPPED
Start: 2025-04-02

## 2025-04-02 RX ORDER — BISACODYL 10 MG
10 SUPPOSITORY, RECTAL RECTAL DAILY PRN
Qty: 10 SUPPOSITORY | Refills: 0 | Status: SHIPPED
Start: 2025-04-02 | End: 2025-05-02

## 2025-04-02 RX ORDER — SENNA AND DOCUSATE SODIUM 50; 8.6 MG/1; MG/1
2 TABLET, FILM COATED ORAL DAILY PRN
Qty: 30 TABLET | Refills: 0 | Status: SHIPPED
Start: 2025-04-02

## 2025-04-02 RX ADMIN — SODIUM CHLORIDE, PRESERVATIVE FREE 10 ML: 5 INJECTION INTRAVENOUS at 09:02

## 2025-04-02 RX ADMIN — BUSPIRONE HYDROCHLORIDE 5 MG: 5 TABLET ORAL at 09:03

## 2025-04-02 RX ADMIN — ENOXAPARIN SODIUM 30 MG: 100 INJECTION SUBCUTANEOUS at 09:03

## 2025-04-02 RX ADMIN — IBUPROFEN 800 MG: 200 TABLET, FILM COATED ORAL at 09:03

## 2025-04-02 RX ADMIN — IBUPROFEN 800 MG: 200 TABLET, FILM COATED ORAL at 00:50

## 2025-04-02 RX ADMIN — VORTIOXETINE 20 MG: 10 TABLET, FILM COATED ORAL at 09:03

## 2025-04-02 NOTE — CARE COORDINATION
Transition of Care Plan to SNF/Rehab    Pt has been accepted to the [facility] and a skilled bed is available today.  RN, please call report to [phone].  Pt will go to room 408A.  Pt's father will transport pt this afternoon.    Communication to Patient/Family:   Met with patient and family and they are agreeable to the transition plan. The Plan for Transition of Care is related to the following treatment goals: Fibroid, uterine [D25.9]  Menorrhagia [N92.0]  Fibroids [D21.9]      The Patient and/or patient representative was provided with a choice of provider and agrees  with the discharge plan.      Yes [x] No []    A Freedom of choice list was provided with basic dialogue that supports the patient's individualized plan of care/goals and shares the quality data associated with the providers.       Yes [x] No []    SNF/Rehab Transition:  Patient has been accepted to [facility] and meets criteria for admission.     SNF reports auth has been received? [x]    Patient will be transported by pt's father and expected to leave after 2 p.m.   [x] Packet on chart (if needed)  [] PCS completed (if applicable)     Communication to SNF/Rehab:  Bedside RN, Pat, has been notified to update the transition plan to the facility and call report (phone number).  Discharge information has been updated on the AVS. And communicated to facility via [employer]/All Scripts, or CC link.     Discharge instructions to be fax'd to facility via [] AllScripts [x] CCLink      Nursing Please include all hard scripts for controlled substances, med rec and dc summary, and AVS in packet.       Nursing, please discuss the following applicable information with the facility's nursing during report:     Carl with (X) only those applicable:  Medication:  []Medications are available at the facility  []IV Antibiotics    []Controlled Substance - hard copies available sent.  []Weekly Labs    Equipment:  []CPAP/BiPAP  []Wound

## 2025-04-02 NOTE — DISCHARGE SUMMARY
Gynecology Discharge Summary     Patient ID:  Pat Szymanski  [id number]  44 y.o.  1980    Admit date: 3/27/2025    Discharge date and time: 4/2/2025  2:40 PM     Admission Diagnoses:    Patient Active Problem List   Diagnosis    Obesity, morbid    Hypercholesterolemia    Depression, major, single episode, moderate (HCC)    Obesity, morbid, BMI 40.0-49.9    History of seizure    Intrinsic eczema    MS (multiple sclerosis) (HCC)    History of cervical dysplasia    Fatigue due to depression    Fibroid, uterine    Menorrhagia    Fibroids       Discharge Diagnoses: There are no discharge diagnoses documented for the most recent discharge.  Patient Active Problem List   Diagnosis    Obesity, morbid    Hypercholesterolemia    Depression, major, single episode, moderate (HCC)    Obesity, morbid, BMI 40.0-49.9    History of seizure    Intrinsic eczema    MS (multiple sclerosis) (HCC)    History of cervical dysplasia    Fatigue due to depression    Fibroid, uterine    Menorrhagia    Fibroids       Procedures for this admission: Procedure(s):  TOTAL ABDOMINAL HYSTERECTOMY, BILATERAL SALPINGECTOMY    Hospital Course: Patient underwent an uncomplicated total abdominal hysterectomy bilateral salpingectomy.  Postop course was completely benign.  Due to her multiple sclerosis she had considerable issues with mobility.  She did receive PT and OT.  After evaluation they recommended intensive inpatient rehab to get her back to baseline.  She was discharged to the [place] [facility].    Disposition: SNF    Discharged Condition: good            Patient Instructions:   Discharge Medication List as of 4/2/2025  2:07 PM        START taking these medications    Details   aluminum & magnesium hydroxide-simethicone (MAALOX PLUS) 200-200-20 MG/5ML SUSP suspension Take 30 mLs by mouth every 6 hours as needed for Indigestion, Disp-35 mL, R-1NO PRINT      enoxaparin Sodium (LOVENOX) 30 MG/0.3ML injection Inject 0.3 mLs into the skin 2

## 2025-04-02 NOTE — PROGRESS NOTES
Gynecology Post Operative Note    Pat GARCIA Nessa    Post-op day 6 from Procedure(s):  TOTAL ABDOMINAL HYSTERECTOMY, BILATERAL SALPINGECTOMY  She is without significant complaints. Pain controlled on current medication.  Voiding without difficulty. Patient is passing flatus. She is  tolerating her diet. Had BM. Only taking advil for pain        Vitals:  /88   Pulse 75   Temp 98.1 °F (36.7 °C) (Oral)   Resp 18   Ht 1.702 m (5' 7\")   Wt (!) 136.5 kg (301 lb)   LMP  (LMP Unknown)   SpO2 95%   BMI 47.14 kg/m²   Temp (24hrs), Av.8 °F (36.6 °C), Min:97.3 °F (36.3 °C), Max:98.1 °F (36.7 °C)      Last 24hr Input/Output:  No intake or output data in the 24 hours ending 25 1010       Exam:  Patient without distress.                  Abdomen soft, bowel sounds present, expected tenderness.                Incision dry and clean without erythema.              Lower extremities are negative for swelling, cords, or tenderness.    Labs:   Lab Results   Component Value Date/Time    WBC 8.4 2025 03:45 AM    WBC 6.0 2025 03:34 PM    WBC 7.8 2024 03:11 PM    WBC 7.8 2024 03:30 PM    WBC 6.3 10/08/2020 01:52 PM    WBC 6.4 2020 01:52 PM    WBC 6.7 03/10/2020 03:48 PM    WBC 4.6 08/15/2019 01:24 PM    WBC 8.3 2019 02:56 PM    WBC Normal 2018 01:57 PM    WBC 7.8 2018 01:57 PM    HGB 12.5 2025 03:45 AM    HGB 15.5 2025 03:34 PM    HGB 13.4 2024 03:11 PM    HGB 8.0 2024 03:30 PM    HGB 12.7 10/08/2020 01:52 PM    HGB 12.6 2020 01:52 PM    HGB 12.4 03/10/2020 03:48 PM    HGB 11.5 08/15/2019 01:24 PM    HGB 10.6 2019 02:56 PM    HGB 11.3 2018 01:57 PM    HCT 37.7 2025 03:45 AM    HCT 45.7 2025 03:34 PM    HCT 42.1 2024 03:11 PM    HCT 28.5 2024 03:30 PM    HCT 36.2 10/08/2020 01:52 PM    HCT 36.9 2020 01:52 PM    HCT 36.0 03/10/2020 03:48 PM    HCT 36.0 08/15/2019 01:24 PM    HCT 34.6 2019 02:56

## 2025-04-15 ENCOUNTER — CARE COORDINATION (OUTPATIENT)
Dept: CASE MANAGEMENT | Age: 45
End: 2025-04-15

## 2025-04-15 DIAGNOSIS — Z90.79 H/O TOTAL HYSTERECTOMY WITH BILATERAL SALPINGO-OOPHORECTOMY (BSO): Primary | ICD-10-CM

## 2025-04-15 DIAGNOSIS — Z90.722 H/O TOTAL HYSTERECTOMY WITH BILATERAL SALPINGO-OOPHORECTOMY (BSO): Primary | ICD-10-CM

## 2025-04-15 DIAGNOSIS — Z90.710 H/O TOTAL HYSTERECTOMY WITH BILATERAL SALPINGO-OOPHORECTOMY (BSO): Primary | ICD-10-CM

## 2025-04-15 PROCEDURE — 1111F DSCHRG MED/CURRENT MED MERGE: CPT | Performed by: FAMILY MEDICINE

## 2025-04-15 NOTE — CARE COORDINATION
Care Transitions Note    Initial Call - Call within 2 business days of discharge: Yes    Patient Current Location:  Home:  [address]  [facility] [place] [address]    Care Transition Nurse contacted the patient by telephone to perform post hospital discharge assessment, verified name and  as identifiers.  Provided introduction to self, and explanation of the Care Transition Nurse role.    Patient: Pat Szymanski    Patient : 1980   MRN: <[id number]>    Reason for Admission: Total abdominal Hysterectomy with Gopi salpingectomy  Discharge Date: 25  RURS: Readmission Risk Score: 3.8      Last Discharge Facility       Date Complaint Diagnosis Description Type Department Provider    3/27/25  Uterine leiomyoma, unspecified location ... Admission (Discharged) [id number] Yenny Francisco MD            Was this an external facility discharge? Yes. Discharge Date: 25. Facility Name: [facility]    Additional needs identified to be addressed with provider   Needs a PCP f/u . Declined assistance              Method of communication with provider: none.    Patients top risk factors for readmission: Total abdominal Hysterectomy with Gopi salpingectomy    Interventions to address risk factors:   Review of patient management of conditions/medications: with patient    Care Summary Note: Patient states she is doing great. States she does not really have any pain, some discomfort. Denies sob, fever, chills, n/v, swelling. She said her incision is all healed up. Appetite good. Hydration adequate. Elimination regular.  Medications reviewed. Patient ambulates using a rollator. She is independent with bathing and dressing. She states her dad makes her dinner. She said she snacks during the day, but is able to fix her breakfast. Patient has an aide every other Tuesday from 11pm to 3pm. Shayna is attending She is receiving therapy outpatient at [facility]. Surgeon appointment . She declined assistance to schedule  a PCP f/u.

## 2025-04-16 ENCOUNTER — OFFICE VISIT (OUTPATIENT)
Age: 45
End: 2025-04-16

## 2025-04-16 VITALS — DIASTOLIC BLOOD PRESSURE: 83 MMHG | HEART RATE: 88 BPM | SYSTOLIC BLOOD PRESSURE: 129 MMHG

## 2025-04-16 DIAGNOSIS — Z48.89 POSTOPERATIVE VISIT: Primary | ICD-10-CM

## 2025-04-16 PROCEDURE — 99024 POSTOP FOLLOW-UP VISIT: CPT | Performed by: OBSTETRICS & GYNECOLOGY

## 2025-04-16 NOTE — PROGRESS NOTES
Pat Szymanski is a 44 y.o. female presents for a problem visit.    Chief Complaint   Patient presents with    Post-Op Check     No LMP recorded. (Menstrual status: Irregular periods).  Birth Control: abstinence.  Last Pap: Normal, HPV Negative obtained 12/30/24    The patient is here for 1 month p/o visit      1. Have you been to the ER, urgent care clinic, or hospitalized since your last visit? No    2. Have you seen or consulted any other health care providers outside of the [facility] since your last visit? No    Examination chaperoned by Little Mancilla LPN.  
identified    Neurologic/Psychiatric  Mental Status:  Orientation: grossly oriented to person, place and time  Mood and Affect: mood normal, affect appropriate      ASSESSMENT:    ICD-10-CM    1. Postoperative visit  Z48.89           PLAN:  Return to office 4 weeks for postop      RTO prn if symptoms persist or worsen.  Instructions given to pt.  Handouts given to pt.

## 2025-04-29 ENCOUNTER — RESULTS FOLLOW-UP (OUTPATIENT)
Age: 45
End: 2025-04-29

## 2025-05-15 NOTE — PROGRESS NOTES
Pat Szymanski is a 44 y.o. female presents for a problem visit.    Chief Complaint   Patient presents with    Post-Op Check     No LMP recorded. (Menstrual status: Irregular periods).  Birth Control: abstinence.  Last Pap: Normal, HPV Negative obtained 12/30/24    The patient is here for post op        1. Have you been to the ER, urgent care clinic, or hospitalized since your last visit? No    2. Have you seen or consulted any other health care providers outside of the [facility] since your last visit? No    Examination chaperoned by Little Mancilla LPN.

## 2025-05-22 ENCOUNTER — OFFICE VISIT (OUTPATIENT)
Age: 45
End: 2025-05-22

## 2025-05-22 VITALS — HEART RATE: 88 BPM | SYSTOLIC BLOOD PRESSURE: 124 MMHG | DIASTOLIC BLOOD PRESSURE: 86 MMHG

## 2025-05-22 DIAGNOSIS — Z48.89 POSTOPERATIVE VISIT: Primary | ICD-10-CM

## 2025-05-22 PROCEDURE — 99024 POSTOP FOLLOW-UP VISIT: CPT | Performed by: OBSTETRICS & GYNECOLOGY

## 2025-05-22 NOTE — PROGRESS NOTES
Postop Hysterectomy Evaluation  Pat Szymanski is a 44 y.o. female returns for a routine post-operative follow-up visit after undergoing the following: total abdominal hysterectomy/BS  which was done 6 weeks ago.  She is doing well.  She is in physical therapy at [facility] for her MS.  Her pathology results revealed benign fibroids.  Since the patient's surgery, she has had typical postoperative discomfort but no significant symptoms or problems since the surgery.   The patient's incision is healing well with no significant drainage.  She states since the procedure, she has returned to full daily activities, ambulating, and not lifting or exercising.  PHYSICAL EXAMINATION    Gastrointestinal  Abdominal Examination: abdomen non-tender to palpation, incision healing well  Skin  General Inspection: no rash, no lesions identified    Neurologic/Psychiatric  Mental Status:  Orientation: grossly oriented to person, place and time  Mood and Affect: mood normal, affect appropriate    Assessment:  Normal postop hysterectomy checkup    Plan:  RTO as scheduled for AE.  Patient was unable to get on the table for pelvic exam.  Her incision is well-healed and she is back to all her normal activity.

## (undated) DEVICE — BANDAGE COBAN 4 IN COMPR W4INXL5YD FOAM COHESIVE QUIK STK SELF ADH SFT

## (undated) DEVICE — SUTURE VCRL SZ 1 L36IN ABSRB UD L36MM CT-1 1/2 CIR J947H

## (undated) DEVICE — BANDAGE COMPR 9 FTX4 IN SMOOTH COMFORTABLE SYNTH ESMRK LF

## (undated) DEVICE — SURGICAL PROCEDURE KIT GEN LAPAROSCOPY LF

## (undated) DEVICE — 3M™ IOBAN™ 2 ANTIMICROBIAL INCISE DRAPE 6650EZ: Brand: IOBAN™ 2

## (undated) DEVICE — REM POLYHESIVE ADULT PATIENT RETURN ELECTRODE: Brand: VALLEYLAB

## (undated) DEVICE — SOLUTION IRRIG 1000ML 09% SOD CHL USP PIC PLAS CONTAINER

## (undated) DEVICE — SPONGE GZ W4XL4IN COT 12 PLY TYP VII WVN C FLD DSGN

## (undated) DEVICE — COVER LT HNDL PLAS RIG 1 PER PK

## (undated) DEVICE — SURGICAL PROCEDURE PACK BASIN MAJ SET CUST NO CAUT

## (undated) DEVICE — MASTISOL ADHESIVE LIQ 2/3ML

## (undated) DEVICE — ALCOHOL RUBBING ISO 16OZ 70%

## (undated) DEVICE — INTENDED FOR TISSUE SEPARATION, AND OTHER PROCEDURES THAT REQUIRE A SHARP SURGICAL BLADE TO PUNCTURE OR CUT.: Brand: BARD-PARKER ® CARBON RIB-BACK BLADES

## (undated) DEVICE — PADDING CST 4INX4YD --

## (undated) DEVICE — SKIN PREP TRAY 4 COMPARTM TRAY: Brand: MEDLINE INDUSTRIES, INC.

## (undated) DEVICE — 2.5MM DRILL BIT/QC/GOLD/110MM

## (undated) DEVICE — INFECTION CONTROL KIT SYS

## (undated) DEVICE — SOLUTION IV 1000ML 0.9% SOD CHL

## (undated) DEVICE — LIQUIBAND RAPID ADHESIVE 36/CS 0.8ML: Brand: MEDLINE

## (undated) DEVICE — SUTURE PDS II SZ 1 L96IN ABSRB VLT TP-1 L65MM 1/2 CIR Z880G

## (undated) DEVICE — STERILE POLYISOPRENE POWDER-FREE SURGICAL GLOVES: Brand: PROTEXIS

## (undated) DEVICE — CARTRIDGE CLP LIG HEMLOK GRN --

## (undated) DEVICE — SUTURE VICRYL + SZ 3-0 L27IN ABSRB UD L26MM SH 1/2 CIR VCP416H

## (undated) DEVICE — DRAPE,U/ SHT,SPLIT,PLAS,STERIL: Brand: MEDLINE

## (undated) DEVICE — 3M™ IOBAN™ 2 ANTIMICROBIAL INCISE DRAPE 6648EZ: Brand: IOBAN™ 2

## (undated) DEVICE — TUBING INSUF HEAT STRL 10 FT --

## (undated) DEVICE — (D)PREP SKN CHLRAPRP APPL 26ML -- CONVERT TO ITEM 371833

## (undated) DEVICE — BIT DRL L145MM DIA3.2MM QUIK CPL W/O STP REUSE

## (undated) DEVICE — CANISTER, RIGID, 3000CC: Brand: MEDLINE INDUSTRIES, INC.

## (undated) DEVICE — LAPAROTOMY-SFMC: Brand: MEDLINE INDUSTRIES, INC.

## (undated) DEVICE — SUTURE MONOCRYL SZ 4-0 L27IN ABSRB UD SH L26MM 1/2 CIR Y415H

## (undated) DEVICE — SUTURE VICRYL SZ 0 L36IN ABSRB UD CT-1 L36MM 1/2 CIR TAPR PNT VCP946H

## (undated) DEVICE — BNDG ELAS HK LOOP 3X5YD NS -- MATRIX

## (undated) DEVICE — 3000CC GUARDIAN II: Brand: GUARDIAN

## (undated) DEVICE — ZIMMER® STERILE DISPOSABLE TOURNIQUET CUFF WITH PROTECTIVE SLEEVE AND PLC, DUAL PORT, SINGLE BLADDER, 18 IN. (46 CM)

## (undated) DEVICE — (D)STRIP SKN CLSR 0.5X4IN WHT --

## (undated) DEVICE — STRAP,POSITIONING,KNEE/BODY,FOAM,4X60": Brand: MEDLINE

## (undated) DEVICE — DRAPE XR C ARM 41X74IN LF --

## (undated) DEVICE — Device

## (undated) DEVICE — NEEDLE HYPO 22GA L1.5IN BLK S STL HUB POLYPR SHLD REG BVL

## (undated) DEVICE — SUTURE STRATAFIX SPRL SZ 1 L14IN ABSRB VLT L48CM CTX 1/2 SXPD2B405

## (undated) DEVICE — SUTURE ABSORBABLE BRAIDED 2-0 CT-1 27 IN UD VICRYL J259H

## (undated) DEVICE — CORD ELECSURG BPLR 12 FT DISP [810T818750] [ADLER INSTRUMENT CO]

## (undated) DEVICE — SUTURE MCRYL SZ 4-0 L27IN ABSRB UD L19MM PS-2 1/2 CIR PRIM Y426H

## (undated) DEVICE — SPONGE LAP 18X18IN STRL -- 5/PK

## (undated) DEVICE — SUTURE VICRYL + SZ 0 L18IN ABSRB UD L36MM CT-1 1/2 CIR VCP840D

## (undated) DEVICE — DEVON™ KNEE AND BODY STRAP 60" X 3" (1.5 M X 7.6 CM): Brand: DEVON

## (undated) DEVICE — GOWN,PREVENTION PLUS,XLN/XL,ST,24/CS: Brand: MEDLINE

## (undated) DEVICE — INSTRMT SET WND CLSR SUT PASS --

## (undated) DEVICE — SUTURE STRATAFIX SYMMETRIC PDS + 1 SGL ARMED CT 18 IN LEN SXPP1A405

## (undated) DEVICE — TRAY CATHETER CATHETER OD16FR 200ML URIN M SIL F URO PREP

## (undated) DEVICE — SUTURE SZ 0 27IN 5/8 CIR UR-6  TAPER PT VIOLET ABSRB VICRYL J603H

## (undated) DEVICE — VISUALIZATION SYSTEM: Brand: CLEARIFY

## (undated) DEVICE — SOL IRRIGATION INJ NACL 0.9% 500ML BTL

## (undated) DEVICE — DRAPE SURG EQUIP W105XH13XL20IN 3 ARM DISPOSABLE DA VINCI S

## (undated) DEVICE — STOCKINETTE,IMPERVIOUS,12X48,STERILE: Brand: MEDLINE

## (undated) DEVICE — GLOVE SURG SZ 65 THK91MIL LTX FREE SYN POLYISOPRENE

## (undated) DEVICE — COVER,TABLE,60X90,STERILE: Brand: MEDLINE

## (undated) DEVICE — SLING ARM LIFETEC ORTH UNIV --

## (undated) DEVICE — TOWEL SURG W17XL27IN STD BLU COT NONFENESTRATED PREWASHED

## (undated) DEVICE — DRAPE,REIN 53X77,STERILE: Brand: MEDLINE

## (undated) DEVICE — TOWEL,OR,DSP,ST,BLUE,STD,4/PK,20PK/CS: Brand: MEDLINE

## (undated) DEVICE — PAD,ABDOMINAL,5"X9",ST,LF,25/BX: Brand: MEDLINE INDUSTRIES, INC.

## (undated) DEVICE — ROCKER SWITCH PENCIL BLADE ELECTRODE, HOLSTER: Brand: EDGE

## (undated) DEVICE — SUTURE MONOCRYL + SZ 3-0 L27IN ABSRB UD PS1 L24MM 3/8 CIR REV MCP936H

## (undated) DEVICE — PACK,BASIC,SIRUS,V: Brand: MEDLINE

## (undated) DEVICE — DRAPE,EXTREMITY,89X128,STERILE: Brand: MEDLINE

## (undated) DEVICE — KENDALL SCD EXPRESS SLEEVES, KNEE LENGTH, MEDIUM: Brand: KENDALL SCD

## (undated) DEVICE — DRESSING,GAUZE,XEROFORM,CURAD,5"X9",ST: Brand: CURAD

## (undated) DEVICE — BLADE ES L6IN ELASTOMERIC COAT EXT DURABLE BEND UPTO 90DEG

## (undated) DEVICE — 1LYRTR 16FR10ML100%SIL UMS SNP: Brand: MEDLINE INDUSTRIES, INC.

## (undated) DEVICE — SUTURE VICRYL + SZ 2-0 L27IN ABSRB VLT L70MM XLH 1/2 CIR VCP581G

## (undated) DEVICE — BIT DRL L110MM DIA3.5MM QUIK CPL W/O STP REUSE

## (undated) DEVICE — COVER LT HNDL BLU PLAS

## (undated) DEVICE — CLEANER ES TIP W2XL2IN ADH BK RADPQ FOR S STL ELECTRD

## (undated) DEVICE — PAD,SANITARY,11 IN,MAXI,N-STRL,IND WRAP: Brand: MEDLINE

## (undated) DEVICE — 3M™ MEDIPORE™ H SOFT CLOTH TAPE SHORT ROLL TAPE 6INCHES X 2 YARDS 16 ROLLS/CASE 2866S: Brand: 3M™ MEDIPORE™

## (undated) DEVICE — DRAPE FLD WRM W44XL66IN C6L FOR INTRATEMP SYS THERMABASIN

## (undated) DEVICE — BLADELESS OPTICAL TROCAR WITH FIXATION CANNULA: Brand: VERSAPORT

## (undated) DEVICE — TELFA NON-ADHERENT ABSORBENT DRESSING: Brand: TELFA

## (undated) DEVICE — DEVICE TRNSF SPIK STL 2008S] MICROTEK MEDICAL INC]

## (undated) DEVICE — TIP COVER ACCESSORY

## (undated) DEVICE — SEALER ENDOSCP NANO COAT OPN DIV CRV L JAW LIGASURE IMPACT

## (undated) DEVICE — SOLUTION IRRIG 1000ML H2O PIC PLAS SHATTERPROOF CONTAINER

## (undated) DEVICE — ELECTRO LUBE IS A SINGLE PATIENT USE DEVICE THAT IS INTENDED TO BE USED ON ELECTROSURGICAL ELECTRODES TO REDUCE STICKING.: Brand: KEY SURGICAL ELECTRO LUBE

## (undated) DEVICE — SPLINT CAST PLASTER 3X15FT -- CONVERT TO ITEM 369010

## (undated) DEVICE — 3M™ TEGADERM™ TRANSPARENT FILM DRESSING FRAME STYLE, 1624W, 2-3/8 IN X 2-3/4 IN (6 CM X 7 CM), 100/CT 4CT/CASE: Brand: 3M™ TEGADERM™

## (undated) DEVICE — OBTRTR BLDELSS 8MM DISP -- DA VINCI - SNGL USE

## (undated) DEVICE — GLOVE ORANGE PI 7 1/2   MSG9075

## (undated) DEVICE — SUTURE VICRYL ABSORBABLE BRAIDED 2-0 CT 36 IN DA UD  VCP957H

## (undated) DEVICE — SPONGE LAP W18XL18IN WHT COT 4 PLY FLD STRUNG RADPQ DISP ST 2 PER PACK